# Patient Record
Sex: MALE | Race: WHITE | NOT HISPANIC OR LATINO | Employment: OTHER | URBAN - METROPOLITAN AREA
[De-identification: names, ages, dates, MRNs, and addresses within clinical notes are randomized per-mention and may not be internally consistent; named-entity substitution may affect disease eponyms.]

---

## 2019-10-18 DIAGNOSIS — M25.561 RIGHT KNEE PAIN, UNSPECIFIED CHRONICITY: Primary | ICD-10-CM

## 2019-10-21 ENCOUNTER — OFFICE VISIT (OUTPATIENT)
Dept: ORTHOPEDICS | Facility: CLINIC | Age: 59
End: 2019-10-21
Payer: COMMERCIAL

## 2019-10-21 ENCOUNTER — HOSPITAL ENCOUNTER (OUTPATIENT)
Dept: RADIOLOGY | Facility: HOSPITAL | Age: 59
Discharge: HOME | End: 2019-10-21
Attending: ORTHOPAEDIC SURGERY
Payer: COMMERCIAL

## 2019-10-21 VITALS
SYSTOLIC BLOOD PRESSURE: 137 MMHG | BODY MASS INDEX: 41.47 KG/M2 | DIASTOLIC BLOOD PRESSURE: 71 MMHG | HEIGHT: 69 IN | WEIGHT: 280 LBS | HEART RATE: 82 BPM

## 2019-10-21 DIAGNOSIS — S83.241A ACUTE MEDIAL MENISCAL TEAR, RIGHT, INITIAL ENCOUNTER: Primary | ICD-10-CM

## 2019-10-21 DIAGNOSIS — M25.561 RIGHT KNEE PAIN, UNSPECIFIED CHRONICITY: ICD-10-CM

## 2019-10-21 PROCEDURE — 73564 X-RAY EXAM KNEE 4 OR MORE: CPT | Mod: 26,RT,, | Performed by: RADIOLOGY

## 2019-10-21 PROCEDURE — 73562 X-RAY EXAM OF KNEE 3: CPT | Mod: TC,PN,59,LT

## 2019-10-21 PROCEDURE — 99203 OFFICE O/P NEW LOW 30 MIN: CPT | Mod: 25,S$GLB,, | Performed by: ORTHOPAEDIC SURGERY

## 2019-10-21 PROCEDURE — 99999 PR PBB SHADOW E&M-EST. PATIENT-LVL III: ICD-10-PCS | Mod: PBBFAC,,, | Performed by: ORTHOPAEDIC SURGERY

## 2019-10-21 PROCEDURE — 73564 XR KNEE ORTHO RIGHT WITH FLEXION: ICD-10-PCS | Mod: 26,RT,, | Performed by: RADIOLOGY

## 2019-10-21 PROCEDURE — 73562 XR KNEE ORTHO RIGHT WITH FLEXION: ICD-10-PCS | Mod: 26,59,LT, | Performed by: RADIOLOGY

## 2019-10-21 PROCEDURE — 99203 PR OFFICE/OUTPT VISIT, NEW, LEVL III, 30-44 MIN: ICD-10-PCS | Mod: 25,S$GLB,, | Performed by: ORTHOPAEDIC SURGERY

## 2019-10-21 PROCEDURE — 73562 X-RAY EXAM OF KNEE 3: CPT | Mod: 26,59,LT, | Performed by: RADIOLOGY

## 2019-10-21 PROCEDURE — 20610 LARGE JOINT ASPIRATION/INJECTION: R KNEE: ICD-10-PCS | Mod: RT,S$GLB,, | Performed by: ORTHOPAEDIC SURGERY

## 2019-10-21 PROCEDURE — 20610 DRAIN/INJ JOINT/BURSA W/O US: CPT | Mod: RT,S$GLB,, | Performed by: ORTHOPAEDIC SURGERY

## 2019-10-21 PROCEDURE — 99999 PR PBB SHADOW E&M-EST. PATIENT-LVL III: CPT | Mod: PBBFAC,,, | Performed by: ORTHOPAEDIC SURGERY

## 2019-10-21 RX ORDER — PRAVASTATIN SODIUM 20 MG/1
TABLET ORAL
Refills: 99 | COMMUNITY
Start: 2019-09-16 | End: 2021-01-26 | Stop reason: SDUPTHER

## 2019-10-21 RX ORDER — SERTRALINE HYDROCHLORIDE 100 MG/1
100 TABLET, FILM COATED ORAL DAILY
COMMUNITY
Start: 2019-08-30 | End: 2021-08-11 | Stop reason: SDUPTHER

## 2019-10-21 RX ORDER — TRIAMCINOLONE ACETONIDE 40 MG/ML
40 INJECTION, SUSPENSION INTRA-ARTICULAR; INTRAMUSCULAR
Status: DISCONTINUED | OUTPATIENT
Start: 2019-10-21 | End: 2019-10-21 | Stop reason: HOSPADM

## 2019-10-21 RX ORDER — LISINOPRIL 10 MG/1
10 TABLET ORAL DAILY
Refills: 99 | COMMUNITY
Start: 2019-09-16 | End: 2021-01-26 | Stop reason: SDUPTHER

## 2019-10-21 RX ORDER — GLIPIZIDE 5 MG/1
5 TABLET ORAL
COMMUNITY
Start: 2019-10-16 | End: 2020-09-03

## 2019-10-21 RX ADMIN — TRIAMCINOLONE ACETONIDE 40 MG: 40 INJECTION, SUSPENSION INTRA-ARTICULAR; INTRAMUSCULAR at 10:10

## 2019-10-21 NOTE — PROCEDURES
Large Joint Aspiration/Injection: R knee  Date/Time: 10/21/2019 10:30 AM  Performed by: Lucio Ivey MD  Authorized by: Lucio Ivey MD     Consent Done?:  Yes (Verbal)  Indications:  Pain  Procedure site marked: Yes    Timeout: Prior to procedure the correct patient, procedure, and site was verified    Anesthesia    Anesthetic: lidocaine 1% without epinephrine and bupivacaine 0.25% without epinephrine  Anesthetic total: 6mL    Location:  Knee  Site:  R knee  Prep: Patient was prepped and draped in usual sterile fashion    Needle size:  20 G  Approach:  Anterolateral  Medications:  40 mg triamcinolone acetonide 40 mg/mL  Patient tolerance:  Patient tolerated the procedure well with no immediate complications

## 2019-10-21 NOTE — PROGRESS NOTES
History reviewed. No pertinent past medical history.    History reviewed. No pertinent surgical history.    Current Outpatient Medications   Medication Sig    glipiZIDE (GLUCOTROL) 5 MG tablet     lisinopril 10 MG tablet Take 10 mg by mouth once daily.    ONETOUCH ULTRA BLUE TEST STRIP Strp test two times DAILY    pravastatin (PRAVACHOL) 20 MG tablet TAKE ONE TABLET BY MOUTH ONCE DAILY TO protect heart from diabetes    sertraline (ZOLOFT) 100 MG tablet      No current facility-administered medications for this visit.        Review of patient's allergies indicates:  No Known Allergies    History reviewed. No pertinent family history.    Social History     Socioeconomic History    Marital status:      Spouse name: Not on file    Number of children: Not on file    Years of education: Not on file    Highest education level: Not on file   Occupational History    Not on file   Social Needs    Financial resource strain: Not on file    Food insecurity:     Worry: Not on file     Inability: Not on file    Transportation needs:     Medical: Not on file     Non-medical: Not on file   Tobacco Use    Smoking status: Not on file   Substance and Sexual Activity    Alcohol use: Not on file    Drug use: Not on file    Sexual activity: Not on file   Lifestyle    Physical activity:     Days per week: Not on file     Minutes per session: Not on file    Stress: Not on file   Relationships    Social connections:     Talks on phone: Not on file     Gets together: Not on file     Attends Worship service: Not on file     Active member of club or organization: Not on file     Attends meetings of clubs or organizations: Not on file     Relationship status: Not on file   Other Topics Concern    Not on file   Social History Narrative    Not on file       Chief Complaint:   Chief Complaint   Patient presents with    Right Knee - Pain       History of present illness:  This is a 59-year-old male seen for right knee  pain.  Patient has had pain for few years.  Pain with prolonged walking or standing.  Pain is posteriorly and medially located.  Patient had a prior meniscal tear with arthroscopy in this knee several years ago.  Symptoms are worsening and moderate to severe.  There was no injury or fall.  Pain is a 2/10.  No recent treatment. Denies mechanical symptoms or locking or catching.        Physical Examination:    Vital Signs:    Vitals:    10/21/19 1020   BP: 137/71   Pulse: 82       Body mass index is 41.35 kg/m².    This a well-developed, well nourished patient in no acute distress.  They are alert and oriented and cooperative to examination.  Pt. walks without an antalgic gait.      Examination of the right knee shows no rashes or erythema. There are no masses ecchymosis or effusion. Patient has full range of motion from 0-130°. Patient is nontender to palpation over lateral joint line and minimally tender r to palpation over the medial joint line. Patient has a - Lachman exam, - anterior drawer exam, and - posterior drawer exam. - Rhea's exam. Knee is stable to varus and valgus stress. 5 out of 5 motor strength. Palpable distal pulses. Intact light touch sensation. Negative Patellofemoral crepitus    Examination of the left knee shows no rashes or erythema. There are no masses ecchymosis or effusion. Patient has full range of motion from 0-130°. Patient is nontender to palpation over lateral joint line and nontender to palpation over the medial joint line. Patient has a - Lachman exam, - anterior drawer exam, and - posterior drawer exam. - Rhea's exam. Knee is stable to varus and valgus stress. 5 out of 5 motor strength. Palpable distal pulses. Intact light touch sensation. Negative Patellofemoral crepitus        X-rays:  X-rays of the right knee are ordered and reviewed which show some mild arthritic changes     Assessment::  Possible medial meniscal tear of the right knee    Plan:  I reviewed the findings  with him today.  He is familiar with meniscal tears as he had a prior meniscectomy.  He is not having any mechanical symptoms though.  I recommended a cortisone injection.  Follow-up as needed.  MRI would be the next step.    This note was created using [employer] voice recognition software that occasionally misinterpreted phrases or words.    Consult note is delivered via Epic messaging service.    Answers for HPI/ROS submitted by the patient on 10/14/2019   Leg pain  unexpected weight change: No  appetite change : No  sleep disturbance: Yes  IMMUNOCOMPROMISED: No  nervous/ anxious: Yes  dysphoric mood: No  rash: No  eye redness: No  cough: No  difficulty urinating: No  hematuria: No  chest pain: No  palpitations: No  vomiting: No  diarrhea: No  constipation: No  headaches: No  seizures: No  Pain Chronicity: recurrent  History of trauma: No  Onset: more than 1 year ago  Frequency: intermittently  Progression since onset: gradually worsening  injury location: exercising  pain- numeric: 3/10  pain location: right knee  pain quality: aching, dull, sharp, numbing, shooting, throbbing  Radiating Pain: Yes  If your pain is radiating, to what part of the body?: right knee, right thigh  Aggravating factors: exercise, standing, walking, sitting  fever: No  inability to bear weight: No  itching: No  joint locking: No  limited range of motion: Yes  stiffness: No  tingling: Yes  Treatments tried: brace/corset  physical therapy: not tried  Improvement on treatment: no relief

## 2020-02-24 DIAGNOSIS — M25.562 LEFT KNEE PAIN, UNSPECIFIED CHRONICITY: Primary | ICD-10-CM

## 2020-09-03 ENCOUNTER — TELEPHONE (OUTPATIENT)
Dept: PRIMARY CARE CLINIC | Facility: CLINIC | Age: 60
End: 2020-09-03

## 2020-09-03 ENCOUNTER — OFFICE VISIT (OUTPATIENT)
Dept: PRIMARY CARE CLINIC | Facility: CLINIC | Age: 60
End: 2020-09-03
Payer: COMMERCIAL

## 2020-09-03 VITALS
TEMPERATURE: 98 F | RESPIRATION RATE: 16 BRPM | BODY MASS INDEX: 42.05 KG/M2 | HEIGHT: 69 IN | DIASTOLIC BLOOD PRESSURE: 86 MMHG | SYSTOLIC BLOOD PRESSURE: 132 MMHG | OXYGEN SATURATION: 96 % | WEIGHT: 283.94 LBS | HEART RATE: 89 BPM

## 2020-09-03 DIAGNOSIS — Z23 NEED FOR VACCINATION: ICD-10-CM

## 2020-09-03 DIAGNOSIS — E11.69 TYPE 2 DIABETES MELLITUS WITH HYPERLIPIDEMIA: Primary | ICD-10-CM

## 2020-09-03 DIAGNOSIS — E78.1 HYPERTRIGLYCERIDEMIA: ICD-10-CM

## 2020-09-03 DIAGNOSIS — I10 ESSENTIAL HYPERTENSION, BENIGN: ICD-10-CM

## 2020-09-03 DIAGNOSIS — E78.5 TYPE 2 DIABETES MELLITUS WITH HYPERLIPIDEMIA: Primary | ICD-10-CM

## 2020-09-03 DIAGNOSIS — Z11.59 NEED FOR HEPATITIS C SCREENING TEST: ICD-10-CM

## 2020-09-03 DIAGNOSIS — E66.01 MORBID OBESITY WITH BMI OF 40.0-44.9, ADULT: ICD-10-CM

## 2020-09-03 DIAGNOSIS — Z12.5 PROSTATE CANCER SCREENING: ICD-10-CM

## 2020-09-03 DIAGNOSIS — F41.1 GAD (GENERALIZED ANXIETY DISORDER): ICD-10-CM

## 2020-09-03 DIAGNOSIS — Z11.4 SCREENING FOR HIV (HUMAN IMMUNODEFICIENCY VIRUS): ICD-10-CM

## 2020-09-03 PROCEDURE — 90471 IMMUNIZATION ADMIN: CPT | Mod: S$GLB,,, | Performed by: FAMILY MEDICINE

## 2020-09-03 PROCEDURE — 90714 TD VACC NO PRESV 7 YRS+ IM: CPT | Mod: S$GLB,,, | Performed by: FAMILY MEDICINE

## 2020-09-03 PROCEDURE — 90732 PPSV23 VACC 2 YRS+ SUBQ/IM: CPT | Mod: S$GLB,,, | Performed by: FAMILY MEDICINE

## 2020-09-03 PROCEDURE — 90471 PNEUMOCOCCAL POLYSACCHARIDE VACCINE 23-VALENT =>2YO SQ IM: ICD-10-PCS | Mod: S$GLB,,, | Performed by: FAMILY MEDICINE

## 2020-09-03 PROCEDURE — 99204 OFFICE O/P NEW MOD 45 MIN: CPT | Mod: 25,S$GLB,, | Performed by: FAMILY MEDICINE

## 2020-09-03 PROCEDURE — 90732 PNEUMOCOCCAL POLYSACCHARIDE VACCINE 23-VALENT =>2YO SQ IM: ICD-10-PCS | Mod: S$GLB,,, | Performed by: FAMILY MEDICINE

## 2020-09-03 PROCEDURE — 99204 PR OFFICE/OUTPT VISIT, NEW, LEVL IV, 45-59 MIN: ICD-10-PCS | Mod: 25,S$GLB,, | Performed by: FAMILY MEDICINE

## 2020-09-03 PROCEDURE — 99999 PR PBB SHADOW E&M-EST. PATIENT-LVL IV: CPT | Mod: PBBFAC,,, | Performed by: FAMILY MEDICINE

## 2020-09-03 PROCEDURE — 90714 TD VACCINE GREATER THAN OR EQUAL TO 7YO PRESERVATIVE FREE IM: ICD-10-PCS | Mod: S$GLB,,, | Performed by: FAMILY MEDICINE

## 2020-09-03 PROCEDURE — 90472 TD VACCINE GREATER THAN OR EQUAL TO 7YO PRESERVATIVE FREE IM: ICD-10-PCS | Mod: S$GLB,,, | Performed by: FAMILY MEDICINE

## 2020-09-03 PROCEDURE — 90472 IMMUNIZATION ADMIN EACH ADD: CPT | Mod: S$GLB,,, | Performed by: FAMILY MEDICINE

## 2020-09-03 PROCEDURE — 99999 PR PBB SHADOW E&M-EST. PATIENT-LVL IV: ICD-10-PCS | Mod: PBBFAC,,, | Performed by: FAMILY MEDICINE

## 2020-09-03 RX ORDER — DIAZEPAM 10 MG/1
10 TABLET ORAL 2 TIMES DAILY PRN
COMMUNITY

## 2020-09-03 RX ORDER — GLIPIZIDE 5 MG/1
5 TABLET, FILM COATED, EXTENDED RELEASE ORAL
Qty: 30 TABLET | Refills: 0 | Status: SHIPPED | OUTPATIENT
Start: 2020-09-03 | End: 2020-10-08 | Stop reason: ALTCHOICE

## 2020-09-03 RX ORDER — ZOSTER VACCINE RECOMBINANT, ADJUVANTED 50 MCG/0.5
0.5 KIT INTRAMUSCULAR ONCE
Qty: 1 EACH | Refills: 0 | Status: SHIPPED | OUTPATIENT
Start: 2020-09-03 | End: 2020-09-03

## 2020-09-03 RX ORDER — METOPROLOL SUCCINATE 25 MG/1
25 TABLET, EXTENDED RELEASE ORAL DAILY
COMMUNITY
Start: 2020-08-13 | End: 2021-12-20

## 2020-09-03 NOTE — PROGRESS NOTES
Patient identified by name and date of birth, allergies reviewed, immunizations administered as ordered by aseptic technique tolerated well by pt.

## 2020-09-03 NOTE — TELEPHONE ENCOUNTER
----- Message from Sami Hernadez MD sent at 9/3/2020  9:07 AM CDT -----  Please get most recent colonoscopy report from Dr. Robles

## 2020-09-03 NOTE — PROGRESS NOTES
"Subjective:       Patient ID: Yosef Giordano is a 60 y.o. male.    Chief Complaint: [facility] Care    Had been seeing Dr. Bonilla for ~10 years, diagnosed with diabetes mellitus last year. Originally started on metformin, but caused bad GI upset, so switched to glipizide, though out for 1-2 weeks. Fasting -150, postprandial often >200. Seeing Dr. Mancia, scheduled for stress test. Has had several colonoscopies by Dr. Robles, most recent 2-3 years ago    Review of Systems   Constitutional: Negative for activity change and unexpected weight change.   HENT: Negative for hearing loss, rhinorrhea and trouble swallowing.    Eyes: Negative for discharge and visual disturbance.   Respiratory: Negative for chest tightness and wheezing.    Cardiovascular: Negative for chest pain and palpitations.   Gastrointestinal: Negative for blood in stool, constipation, diarrhea and vomiting.   Endocrine: Negative for polydipsia and polyuria.   Genitourinary: Negative for difficulty urinating, hematuria and urgency.   Musculoskeletal: Negative for arthralgias, joint swelling and neck pain.   Allergic/Immunologic: Negative for immunocompromised state.   Neurological: Negative for weakness and headaches.   Psychiatric/Behavioral: Negative for confusion and dysphoric mood.       Objective:      Vitals:    09/03/20 0847   BP: 132/86   BP Location: Left arm   Patient Position: Sitting   BP Method: Large (Manual)   Pulse: 89   Resp: 16   Temp: 98 °F (36.7 °C)   TempSrc: Oral   SpO2: 96%   Weight: 128.8 kg (283 lb 15.2 oz)   Height: 5' 9" (1.753 m)     Physical Exam  Vitals signs and nursing note reviewed.   Constitutional:       Appearance: He is well-developed.   HENT:      Head: Normocephalic and atraumatic.   Eyes:      Pupils: Pupils are equal, round, and reactive to light.   Neck:      Musculoskeletal: Neck supple.      Vascular: No carotid bruit or JVD.   Cardiovascular:      Rate and Rhythm: Normal rate and regular rhythm. "      Pulses:           Radial pulses are 2+ on the right side and 2+ on the left side.        Dorsalis pedis pulses are 2+ on the right side and 2+ on the left side.      Heart sounds: Normal heart sounds.   Pulmonary:      Effort: Pulmonary effort is normal.      Breath sounds: Normal breath sounds.   Abdominal:      General: Bowel sounds are normal.      Palpations: Abdomen is soft.      Tenderness: There is no abdominal tenderness.   Feet:      Right foot:      Protective Sensation: 10 sites tested. 10 sites sensed.      Skin integrity: Skin integrity normal.      Left foot:      Protective Sensation: 10 sites tested. 10 sites sensed.      Skin integrity: Skin integrity normal.   Skin:     General: Skin is warm and dry.   Neurological:      Mental Status: He is alert and oriented to person, place, and time.   Psychiatric:         Behavior: Behavior normal.         Lab Results   Component Value Date    CHOL 172 01/02/2019    TRIG 299 (H) 01/02/2019    HDL 46 01/02/2019    ALT 22 01/02/2019    AST 24 01/02/2019     (L) 01/02/2019    K 3.8 01/02/2019    CL 98 (L) 01/02/2019    CREATININE 0.9 01/02/2019    BUN 16 01/02/2019    CO2 24 01/02/2019    HGBA1C 8.3 (H) 01/17/2019      Assessment:       1. Type 2 diabetes mellitus with hyperlipidemia    2. Essential hypertension, benign    3. Hypertriglyceridemia    4. Morbid obesity with BMI of 40.0-44.9, adult    5. JOVITA (generalized anxiety disorder)    6. Need for vaccination    7. Prostate cancer screening    8. Need for hepatitis C screening test    9. Screening for HIV (human immunodeficiency virus)        Plan:       Type 2 diabetes mellitus with hyperlipidemia  -     Comprehensive metabolic panel; Future; Expected date: 09/03/2020  -     Hemoglobin A1C; Future; Expected date: 09/03/2020  -     Microalbumin/creatinine urine ratio; Future; Expected date: 09/03/2020  -     TSH; Future; Expected date: 09/03/2020  -      DIABETES FOOT EXAM  -     glipiZIDE  (GLUCOTROL) 5 MG TR24; Take 1 tablet (5 mg total) by mouth daily with breakfast.  Dispense: 30 tablet; Refill: 0  Switch to XR, adjust meds further if necessary  Essential hypertension, benign  -     CBC auto differential; Future; Expected date: 09/03/2020  -     Comprehensive metabolic panel; Future; Expected date: 09/03/2020  -     TSH; Future; Expected date: 09/03/2020  BP well controlled  Hypertriglyceridemia  -     Comprehensive metabolic panel; Future; Expected date: 09/03/2020  -     Lipid Panel; Future; Expected date: 09/03/2020  -     TSH; Future; Expected date: 09/03/2020  Fasting labs today  Morbid obesity with BMI of 40.0-44.9, adult  Low carb diet, exercise  JOVITA (generalized anxiety disorder)  Stable on current regimen  Need for vaccination  -     Pneumococcal Polysaccharide Vaccine (23 Valent) (SQ/IM)  -     Td Vaccine (Adult) - Preservative Free  -     varicella-zoster gE-AS01B, PF, (SHINGRIX, PF,) 50 mcg/0.5 mL injection; Inject 0.5 mLs into the muscle once. for 1 dose  Dispense: 1 each; Refill: 0    Prostate cancer screening  -     PSA, Screening; Future; Expected date: 09/03/2020    Need for hepatitis C screening test  -     Hepatitis C Antibody; Future; Expected date: 09/03/2020    Screening for HIV (human immunodeficiency virus)  -     HIV 1/2 Ag/Ab (4th Gen); Future; Expected date: 09/03/2020      Medication List with Changes/Refills   New Medications    GLIPIZIDE (GLUCOTROL) 5 MG TR24    Take 1 tablet (5 mg total) by mouth daily with breakfast.    VARICELLA-ZOSTER GE-AS01B, PF, (SHINGRIX, PF,) 50 MCG/0.5 ML INJECTION    Inject 0.5 mLs into the muscle once. for 1 dose   Current Medications    DIAZEPAM (VALIUM) 10 MG TAB    Take 10 mg by mouth 2 (two) times daily as needed.    LISINOPRIL 10 MG TABLET    Take 10 mg by mouth once daily.    METOPROLOL SUCCINATE (TOPROL-XL) 25 MG 24 HR TABLET    Take 25 mg by mouth once daily.    ONETOUCH ULTRA BLUE TEST STRIP STRP    test two times DAILY     PRAVASTATIN (PRAVACHOL) 20 MG TABLET    TAKE ONE TABLET BY MOUTH ONCE DAILY TO protect heart from diabetes    SERTRALINE (ZOLOFT) 100 MG TABLET    Take 100 mg by mouth once daily.    Discontinued Medications    GLIPIZIDE (GLUCOTROL) 5 MG TABLET    Take 5 mg by mouth daily with breakfast.

## 2020-09-25 ENCOUNTER — PATIENT MESSAGE (OUTPATIENT)
Dept: OTHER | Facility: OTHER | Age: 60
End: 2020-09-25

## 2020-10-03 DIAGNOSIS — E78.5 TYPE 2 DIABETES MELLITUS WITH HYPERLIPIDEMIA: Primary | ICD-10-CM

## 2020-10-03 DIAGNOSIS — E11.69 TYPE 2 DIABETES MELLITUS WITH HYPERLIPIDEMIA: Primary | ICD-10-CM

## 2020-10-08 ENCOUNTER — OFFICE VISIT (OUTPATIENT)
Dept: DIABETES | Facility: CLINIC | Age: 60
End: 2020-10-08
Payer: COMMERCIAL

## 2020-10-08 VITALS
OXYGEN SATURATION: 98 % | SYSTOLIC BLOOD PRESSURE: 133 MMHG | WEIGHT: 282.69 LBS | BODY MASS INDEX: 41.87 KG/M2 | HEART RATE: 75 BPM | DIASTOLIC BLOOD PRESSURE: 65 MMHG | HEIGHT: 69 IN

## 2020-10-08 DIAGNOSIS — E78.1 HYPERTRIGLYCERIDEMIA: ICD-10-CM

## 2020-10-08 DIAGNOSIS — E11.65 TYPE 2 DIABETES MELLITUS WITH HYPERGLYCEMIA, WITHOUT LONG-TERM CURRENT USE OF INSULIN: Primary | ICD-10-CM

## 2020-10-08 DIAGNOSIS — E78.5 TYPE 2 DIABETES MELLITUS WITH HYPERLIPIDEMIA: ICD-10-CM

## 2020-10-08 DIAGNOSIS — E11.29 TYPE 2 DIABETES MELLITUS WITH MICROALBUMINURIA, WITHOUT LONG-TERM CURRENT USE OF INSULIN: ICD-10-CM

## 2020-10-08 DIAGNOSIS — E11.42 TYPE 2 DIABETES MELLITUS WITH DIABETIC POLYNEUROPATHY, WITHOUT LONG-TERM CURRENT USE OF INSULIN: ICD-10-CM

## 2020-10-08 DIAGNOSIS — R80.9 TYPE 2 DIABETES MELLITUS WITH MICROALBUMINURIA, WITHOUT LONG-TERM CURRENT USE OF INSULIN: ICD-10-CM

## 2020-10-08 DIAGNOSIS — E11.69 TYPE 2 DIABETES MELLITUS WITH HYPERLIPIDEMIA: ICD-10-CM

## 2020-10-08 DIAGNOSIS — Z71.9 HEALTH EDUCATION/COUNSELING: ICD-10-CM

## 2020-10-08 DIAGNOSIS — E66.01 MORBID OBESITY WITH BMI OF 40.0-44.9, ADULT: ICD-10-CM

## 2020-10-08 LAB — GLUCOSE SERPL-MCNC: 133 MG/DL (ref 70–110)

## 2020-10-08 PROCEDURE — 99204 PR OFFICE/OUTPT VISIT, NEW, LEVL IV, 45-59 MIN: ICD-10-PCS | Mod: S$GLB,,, | Performed by: NURSE PRACTITIONER

## 2020-10-08 PROCEDURE — 82962 GLUCOSE BLOOD TEST: CPT | Mod: S$GLB,,, | Performed by: NURSE PRACTITIONER

## 2020-10-08 PROCEDURE — 82962 POCT GLUCOSE, HAND-HELD DEVICE: ICD-10-PCS | Mod: S$GLB,,, | Performed by: NURSE PRACTITIONER

## 2020-10-08 PROCEDURE — 99999 PR PBB SHADOW E&M-EST. PATIENT-LVL V: CPT | Mod: PBBFAC,,, | Performed by: NURSE PRACTITIONER

## 2020-10-08 PROCEDURE — 99204 OFFICE O/P NEW MOD 45 MIN: CPT | Mod: S$GLB,,, | Performed by: NURSE PRACTITIONER

## 2020-10-08 PROCEDURE — 99999 PR PBB SHADOW E&M-EST. PATIENT-LVL V: ICD-10-PCS | Mod: PBBFAC,,, | Performed by: NURSE PRACTITIONER

## 2020-10-08 RX ORDER — FLASH GLUCOSE SENSOR
1 KIT MISCELLANEOUS
Qty: 2 KIT | Refills: 11 | Status: SHIPPED | OUTPATIENT
Start: 2020-10-08 | End: 2021-01-08

## 2020-10-08 RX ORDER — DULAGLUTIDE 0.75 MG/.5ML
0.75 INJECTION, SOLUTION SUBCUTANEOUS WEEKLY
Qty: 4 PEN | Refills: 0 | Status: SHIPPED | OUTPATIENT
Start: 2020-10-08 | End: 2020-12-08

## 2020-10-08 NOTE — ASSESSMENT & PLAN NOTE
Uncontrolled.   He has been working on diet and cutting back on CHO intake   Has an upcoming appointment with diabetes education.   Placed a personal CGM ( Freestyle selam on patient today) given a sample        -- Medication Changes: Discontinue Glipizide   Start Trulicity 0.75 mg weekly for 4 weeks and then increase to 1.5 mg weekly indefinitely. Discussed MOA & SE.           -- Reviewed goals of therapy are to get the best control we can without hypoglycemia.    -- Advised frequent self blood glucose monitoring.  Patient encouraged to document glucose results and bring them to every clinic visit. - at least 2 times per day if doing finger sticks. Will try to get freestyle selam sensor. RX sent to pharmacy. Sample placed on patient today in clinic   -- Hypoglycemia precautions discussed. Instructed on precautions before driving.    -- Call for Bg repeatedly < 90 or > 180.   -- Close adherence to lifestyle changes recommended.   -- Periodic follow ups for eye evaluations, foot care and dental care suggested.  -- Refer to diabetes education- diet, comprehensive review, freestyle selam       -- external referral for eye exam- he plans to see Dr. Melendrez

## 2020-10-08 NOTE — ASSESSMENT & PLAN NOTE
Optimize BG readings.   See above.   Referral to podiatry for nail care     Educated patient to check feet daily for any foreign objects and/or wounds. Discussed with patient the importance of wearing appropriate footwear at all times, not to walk barefoot ever, and to check shoes before putting them on feet. Instructed patient to keep feet dry by regularly changing shoes and socks and drying feet after baths and exercises. Also, instructed patient to report any new lesions, discolorations, or swelling to a healthcare professional.

## 2020-10-08 NOTE — ASSESSMENT & PLAN NOTE
Body mass index is 41.75 kg/m².  Increases insulin resistance.   Discussed DM diet and exercise.   Hopeful for weight loss with GLP1

## 2020-10-08 NOTE — LETTER
October 8, 2020      Sami Hernadez MD  [address]  [address]  [place] [address]           [facility]  [address], [facility] [address]  [place] [address]  Phone: [phone]  Fax: [phone]          Patient: Yosef Giordano   MR Number: [id number]   YOB: 1960   Date of Visit: 10/8/2020       Dear Dr. Sami Hernadez:    Thank you for referring Yosef Giordano to me for evaluation. Attached you will find relevant portions of my assessment and plan of care.    If you have questions, please do not hesitate to call me. I look forward to following Yosef Giordano along with you.    Sincerely,    Martine Doty, CRISTINA    Enclosure  CC:  No Recipients    If you would like to receive this communication electronically, please contact [email or web address] or [phone] to request more information on [facility] access.    For providers and/or their staff who would like to refer a patient to [facility], please contact us through our one-stop-shop provider referral line, [facility], at [phone].    If you feel you have received this communication in error or would no longer like to receive these types of communications, please e-mail [email or web address]

## 2020-10-08 NOTE — PATIENT INSTRUCTIONS
Stop Glipizide     Start Trulicity 0.75 mg weekly for 4 weeks and then increase to 1.5 mg weekly indefinitely. Discussed MOA & SE.   Please notify me for any abdominal pain, nausea, vomiting, diarrhea.   [phone]    Add in over the counter fish oil   1 gram (1000 mg) 2 times per day

## 2020-10-08 NOTE — PROGRESS NOTES
CC:   Chief Complaint   Patient presents with    Diabetes Mellitus     Tested pts  in office       HPI: Yosef Giordano is a 60 y.o. male presents for an initial visit today for the management of T2DM.     He was diagnosed with Type 2 diabetes in 6084-1643 on routine lab work and initally started on metformin. He reports severe GI upset with Metformin use. Never on insulin therapy.     Family hx of diabetes: sister- prediabetes   Hospitalized for diabetes: denies     No personal or FH of thyroid cancer or personal of pancreatic cancer or pancreatitis.     DIABETES COMPLICATIONS: nephropathy and peripheral neuropathy      Diabetes Management Status    ASA:  No    Statin: Taking  ACE/ARB: Taking    Screening or Prevention Patient's value Goal Complete/Controlled?   HgA1C Testing and Control   Lab Results   Component Value Date    HGBA1C 8.5 (H) 09/08/2020      Annually/Less than 8% No   Lipid profile : 09/08/2020 Annually Yes   LDL control Lab Results   Component Value Date    LDLCALC 93 09/08/2020    Annually/Less than 100 mg/dl  Yes   Nephropathy screening Lab Results   Component Value Date    LABMICR 99.0 09/08/2020     Lab Results   Component Value Date    PROTEINUA 1+ (A) 01/02/2019    Annually Yes   Blood pressure BP Readings from Last 1 Encounters:   10/08/20 133/65    Less than 140/90 Yes   Dilated retinal exam Most Recent Eye Exam Date: Not Found due for eye exam  Annually No   Foot exam   : 09/03/2020 Annually Yes       CURRENT A1C:    Hemoglobin A1C   Date Value Ref Range Status   09/08/2020 8.5 (H) 4.0 - 5.6 % Final     Comment:     ADA Screening Guidelines:  5.7-6.4%  Consistent with prediabetes  >or=6.5%  Consistent with diabetes  High levels of fetal hemoglobin interfere with the HbA1C  assay. Heterozygous hemoglobin variants (HbS, HgC, etc)do  not significantly interfere with this assay.   However, presence of multiple variants may affect accuracy.     01/17/2019 8.3 (H) 4.0 - 5.6 % Final      Comment:     ADA Screening Guidelines:  5.7-6.4%  Consistent with prediabetes  >or=6.5%  Consistent with diabetes  High levels of fetal hemoglobin interfere with the HbA1C  assay. Heterozygous hemoglobin variants (HbS, HgC, etc)do  not significantly interfere with this assay.   However, presence of multiple variants may affect accuracy.         GOAL A1C: 6.5% without hypoglycemia       DM MEDICATIONS USED IN THE PAST: Metformin- severe GI upset   Glipizide       CURRENT DIABETES MEDICATIONS: Glipizide 5 mg daily   Insulin: N/A   Missed doses: rarely       BLOOD GLUCOSE MONITORING:  He checks 2 times per day   No logs or meter to clinic today for review.   Per oral recall   160-180  Low 200  Today in office 133- which is the best he has seen       Results for orders placed or performed in visit on 10/08/20   POCT Glucose, Hand-Held Device   Result Value Ref Range    POC Glucose 133 (A) 70 - 110 MG/DL         HYPOGLYCEMIA:  No      MEALS: eating 2-3 meals per day   Cut back on CHO intake   BF: turkey sausage and eggs   Lunch: skips or has a light snack- cheese   Dinner: baked chicken - with brown rice , cauliflower rice or salad- ranch   Snack: cheese   Drinks: water   Coffee in the AM- splenda - SF creamer   No juices or sodas        CURRENT EXERCISE:  No    Review of Systems  Review of Systems   Constitutional: Negative for appetite change, fatigue and unexpected weight change.   HENT: Negative for trouble swallowing.    Eyes: Negative for visual disturbance.   Respiratory: Negative for shortness of breath.    Cardiovascular: Negative for chest pain.   Gastrointestinal: Negative for nausea.   Endocrine: Negative for polydipsia, polyphagia and polyuria.   Genitourinary:        + Nocturia - at least 1 x per night    Skin: Negative for wound.   Neurological: Positive for numbness (feet feel cold ).       Physical Exam   Physical Exam  Vitals signs and nursing note reviewed.   Constitutional:       Appearance: He is  well-developed. He is obese.   HENT:      Head: Normocephalic and atraumatic.      Right Ear: External ear normal.      Left Ear: External ear normal.      Nose: Nose normal.   Neck:      Musculoskeletal: Normal range of motion and neck supple.      Thyroid: No thyromegaly.      Trachea: No tracheal deviation.   Cardiovascular:      Rate and Rhythm: Normal rate and regular rhythm.      Heart sounds: No murmur.   Pulmonary:      Effort: Pulmonary effort is normal. No respiratory distress.      Breath sounds: Normal breath sounds.   Abdominal:      Palpations: Abdomen is soft.      Tenderness: There is no abdominal tenderness.      Hernia: No hernia is present.   Skin:     General: Skin is warm and dry.      Capillary Refill: Capillary refill takes less than 2 seconds.      Findings: No rash.   Neurological:      Mental Status: He is alert and oriented to person, place, and time.      Cranial Nerves: No cranial nerve deficit.   Psychiatric:         Behavior: Behavior normal.         Judgment: Judgment normal.         FOOT EXAMINATION: Appropriate footwear     Protective Sensation (w/ 10 gram monofilament):  Right: Intact  Left: Intact    Visual Inspection:  Normal -  Bilateral, Nails Intact - without Evidence of Foot Deformity- Bilateral, Callus -  Bilateral and Dry Skin -  Bilateral    Pedal Pulses:   Right: Present  Left: Present    Posterior tibialis:   Right:Present  Left: Present        Lab Results   Component Value Date    TSH 2.96 09/08/2020           Type 2 diabetes mellitus with hyperglycemia, without long-term current use of insulin  Uncontrolled.   He has been working on diet and cutting back on CHO intake   Has an upcoming appointment with diabetes education.   Placed a personal CGM ( Freestyle selam on patient today) given a sample        -- Medication Changes: Discontinue Glipizide   Start Trulicity 0.75 mg weekly for 4 weeks and then increase to 1.5 mg weekly indefinitely. Discussed MOA & SE.            -- Reviewed goals of therapy are to get the best control we can without hypoglycemia.    -- Advised frequent self blood glucose monitoring.  Patient encouraged to document glucose results and bring them to every clinic visit. - at least 2 times per day if doing finger sticks. Will try to get freestyle selam sensor. RX sent to pharmacy. Sample placed on patient today in clinic   -- Hypoglycemia precautions discussed. Instructed on precautions before driving.    -- Call for Bg repeatedly < 90 or > 180.   -- Close adherence to lifestyle changes recommended.   -- Periodic follow ups for eye evaluations, foot care and dental care suggested.  -- Refer to diabetes education- diet, comprehensive review, freestyle selam       -- external referral for eye exam- he plans to see Dr. Melendrez     Type 2 diabetes mellitus with microalbuminuria, without long-term current use of insulin  Optimize BG readings.   See above.   On ACE-inhibitor    Type 2 diabetes mellitus with diabetic polyneuropathy, without long-term current use of insulin  Optimize BG readings.   See above.   Referral to podiatry for nail care     Educated patient to check feet daily for any foreign objects and/or wounds. Discussed with patient the importance of wearing appropriate footwear at all times, not to walk barefoot ever, and to check shoes before putting them on feet. Instructed patient to keep feet dry by regularly changing shoes and socks and drying feet after baths and exercises. Also, instructed patient to report any new lesions, discolorations, or swelling to a healthcare professional.        Morbid obesity with BMI of 40.0-44.9, adult  Body mass index is 41.75 kg/m².  Increases insulin resistance.   Discussed DM diet and exercise.   Hopeful for weight loss with GLP1    Hypertriglyceridemia  Optimize BG readings.  On Statin    See above.   Recommended OTC fish oil - 1 gram BID        Spent 60 minutes with patient with >50% time spent in  counseling on medication options, diet, personal CGM, set him up on the personal CGM in clinic today.       Follow up in about 3 months (around 1/8/2021).      Orders Placed This Encounter   Procedures    Hemoglobin A1C     Standing Status:   Future     Standing Expiration Date:   4/8/2022    Basic Metabolic Panel     Standing Status:   Future     Standing Expiration Date:   4/8/2022    Lipid Panel     Standing Status:   Future     Standing Expiration Date:   4/8/2022    Ambulatory referral/consult to Optometry     Standing Status:   Future     Standing Expiration Date:   11/8/2021     Referral Priority:   Routine     Referral Type:   Vision (Optometry)     Referral Reason:   Specialty Services Required     Requested Specialty:   Optometry     Number of Visits Requested:   1    Ambulatory referral/consult to Diabetes Education     Standing Status:   Future     Standing Expiration Date:   10/8/2021     Referral Priority:   Routine     Referral Type:   Consultation     Referral Reason:   Specialty Services Required     Referred to Provider:   Linnea Sifuentes RD, CDE     Requested Specialty:   Diabetes     Number of Visits Requested:   1    Ambulatory referral/consult to Podiatry     Standing Status:   Future     Standing Expiration Date:   11/8/2021     Referral Priority:   Routine     Referral Type:   Consultation     Referral Reason:   Specialty Services Required     Referred to Provider:   Felipa John DPM     Requested Specialty:   Podiatry     Number of Visits Requested:   1    POCT Glucose, Hand-Held Device       Recommendations were discussed with the patient in detail  The patient verbalized understanding and agrees with the plan outlined as above.     This note was partly generated with [employer] voice recognition software. I apologize for any possible typographical errors.

## 2020-10-12 ENCOUNTER — PATIENT MESSAGE (OUTPATIENT)
Dept: DIABETES | Facility: CLINIC | Age: 60
End: 2020-10-12

## 2020-10-12 NOTE — TELEPHONE ENCOUNTER
Spoke to pharmacy and they informed pt needs a PA for the Trulicity. [facility] Key: [id number] PA case ID: [id number]

## 2020-11-24 ENCOUNTER — CLINICAL SUPPORT (OUTPATIENT)
Dept: DIABETES | Facility: CLINIC | Age: 60
End: 2020-11-24
Payer: COMMERCIAL

## 2020-11-24 VITALS — HEIGHT: 69 IN | BODY MASS INDEX: 41.86 KG/M2 | WEIGHT: 282.63 LBS

## 2020-11-24 DIAGNOSIS — E11.69 TYPE 2 DIABETES MELLITUS WITH HYPERLIPIDEMIA: ICD-10-CM

## 2020-11-24 DIAGNOSIS — E78.5 TYPE 2 DIABETES MELLITUS WITH HYPERLIPIDEMIA: ICD-10-CM

## 2020-11-24 PROCEDURE — 99999 PR PBB SHADOW E&M-EST. PATIENT-LVL III: CPT | Mod: PBBFAC,,, | Performed by: DIETITIAN, REGISTERED

## 2020-11-24 PROCEDURE — 99999 PR PBB SHADOW E&M-EST. PATIENT-LVL III: ICD-10-PCS | Mod: PBBFAC,,, | Performed by: DIETITIAN, REGISTERED

## 2020-11-24 PROCEDURE — G0108 PR DIAB MANAGE TRN  PER INDIV: ICD-10-PCS | Mod: S$GLB,,, | Performed by: DIETITIAN, REGISTERED

## 2020-11-24 PROCEDURE — G0108 DIAB MANAGE TRN  PER INDIV: HCPCS | Mod: S$GLB,,, | Performed by: DIETITIAN, REGISTERED

## 2020-11-24 NOTE — PROGRESS NOTES
Diabetes Education  Author: Linnea Sifuentes RD, CDE  Date: 11/24/2020    Diabetes Care Management Summary  Diabetes Education Record Assessment/Progress: Initial  Current Diabetes Risk Level: Moderate     Last A1c:   Lab Results   Component Value Date    HGBA1C 8.5 (H) 09/08/2020     Last visit with Diabetes Educator: Last Education Visit: Not Found      Diabetes Type  Diabetes Type : Type II    Diabetes History  Diabetes Diagnosis: 1-3 years(Dx in Jan 2019)  Current Treatment: Injectable, Diet(trulicity)  Reviewed Problem List with Patient: Yes    Health Maintenance was reviewed today with patient. Discussed with patient importance of routine eye exams, foot exams/foot care, blood work (i.e.: A1c, microalbumin, and lipid), dental visits, yearly flu vaccine, and pneumonia vaccine as indicated by PCP. Patient verbalized understanding.     Health Maintenance Topics with due status: Not Due       Topic Last Completion Date    Colorectal Cancer Screening 08/03/2017    TETANUS VACCINE 09/03/2020    PROSTATE-SPECIFIC ANTIGEN 09/08/2020    Lipid Panel 09/08/2020    Hemoglobin A1c 09/08/2020    Low Dose Statin 10/08/2020    Foot Exam 10/08/2020     Health Maintenance Due   Topic Date Due    Eye Exam  02/14/1970    Shingles Vaccine (2 of 2) 10/29/2020       Nutrition  Meal Planning: skipping meals, artificial sweeteners, snacks between meal, water, eats out seldom(eating out a couple times a week)  What type of sweetener do you use?: Stevia/Truvia  What type of beverages do you drink?: water, other (see comments), regular soda/tea(coffee, every once and a while sweet tea)  Meal Plan 24 Hour Recall - Breakfast: 10AM Turkey breakfast sausage and eggs, coffee with SF creamer and truvia  Meal Plan 24 Hour Recall - Lunch: skipped  Meal Plan 24 Hour Recall - Dinner: Red beans with cauliflower rice  Meal Plan 24 Hour Recall - Snack: chips (not lately), cheese-its(when bored)    Monitoring   Monitoring: One Touch Verio IQ  Self  Monitoring : testing every couple days  Blood Glucose Logs: No  Do you use a personal continuous glucose monitor?: No  In the last month, how often have you had a low blood sugar reaction?: never  What are your symptoms of low blood sugar?: weak and shaky  How do you treat low blood sugar?: juice  Can you tell when your blood sugar is too high?: no  How do you treat high blood sugar?: medication    Exercise   Exercise Type: none(working around the house a lot lately)  Frequency: Never    Current Diabetes Treatment   Current Treatment: Injectable, Diet(trulicity)    Social History  Preferred Learning Method: Face to Face, Hands On  Primary Support: Self, Spouse  Educational Level: Some College  Occupation: retired  Smoking Status: Never a Smoker  Alcohol Use: Rarely            DDS-2 Score  ( > 3 = SIGNIFICANT DISTRESS): 2.5                   Barriers to Change  Barriers to Change: None  Learning Challenges : None    Readiness to Learn   Readiness to Learn : Acceptance    Cultural Influences  Cultural Influences: No    Diabetes Education Assessment/Progress  Diabetes Disease Process (diabetes disease process and treatment options): Individual Session, Written Materials Provided, Instructed, Discussion, Comprehends Key Points  Nutrition (Incorporating nutritional management into one's lifestyle): Individual Session, Written Materials Provided, Instructed, Demonstration, Discussion, Comprehends Key Points  Physical Activity (incorporating physical activity into one's lifestyle): Individual Session, Written Materials Provided, Instructed, Discussion, Comprehends Key Points  Medications (states correct name, dose, onset, peak, duration, side effects & timing of meds): Individual Session, Written Materials Provided, Instructed, Discussion, Comprehends Key Points  Monitoring (monitoring blood glucose/other parameters & using results): Individual Session, Instructed, Discussion, Comprehends Key Points, Written Materials  Provided  Acute Complications (preventing, detecting, and treating acute complications): Individual Session, Written Materials Provided, Instructed, Discussion, Comprehends Key Points  Chronic Complications (preventing, detecting, and treating chronic complications): Individual Session, Written Materials Provided, Instructed, Discussion, Comprehends Key Points  Clinical (diabetes, other pertinent medical history, and relevant comorbidities reviewed during visit): Individual Session, Written Materials Provided, Instructed, Discussion, Comprehends Key Points  Cognitive (knowledge of self-management skills, functional health literacy): Individual Session, Written Materials Provided, Instructed, Discussion, Comprehends Key Points  Psychosocial (emotional response to diabetes): Individual Session, Written Materials Provided, Instructed, Discussion, Comprehends Key Points  Diabetes Distress and Support Systems: Individual Session, Written Materials Provided, Instructed, Discussion, Comprehends Key Points  Behavioral (readiness for change, lifestyle practices, self-care behaviors): Individual Session, Written Materials Provided, Instructed, Discussion, Comprehends Key Points  Patient educated on what is DM, T1DM, T2DM, risk factors, managing DM, DM diet, carbohydrate counting, meal planning, reading a food label, healthy snack options, benefits of physical activity, diabetes care schedule, foot care guidelines, diabetes and retinopathy screening, s/s hypo and hyperglycemia, long/short term complication of uncontrolled DM, importance of compliance with treatment plan, how to use a glucometer, reviewed understanding diabetes distress, medications for treating DM, their mechanism of action and possible side effects, reviewed current level and goal level for HgbA1c, blood glucose, microalbumin, and lipids. Patient provided with written literature, diabetes management resources and support, DM Management program contact  information.    Goals  Patient has selected/evaluated goals during today's session: Yes, selected  Healthy Eating: Set  Start Date: 11/24/20  Target Date: 12/24/20         Diabetes Care Plan/Intervention  Education Plan/Intervention: Individual Follow-Up DSMT    Diabetes Meal Plan  Restrictions: Low Fat, Low Sodium, Restricted Carbohydrate  Calories: 1800  Carbohydrate Per Meal: 30-45g  Carbohydrate Per Snack : 7-15g  Fat: 50  Protein: 135    Today's Self-Management Care Plan was developed with the patient's input and is based on barriers identified during today's assessment.    The long and short-term goals in the care plan were written with the patient/caregiver's input. The patient has agreed to work toward these goals to improve his overall diabetes control.      The patient received a copy of today's self-management plan and verbalized understanding of the care plan, goals, and all of today's instructions.      The patient was encouraged to communicate with his physician and care team regarding his condition(s) and treatment.  I provided the patient with my contact information today and encouraged him to contact me via phone or patient portal as needed.     Education Units of Time   Time Spent: 60 min

## 2020-12-08 DIAGNOSIS — E11.65 TYPE 2 DIABETES MELLITUS WITH HYPERGLYCEMIA, WITHOUT LONG-TERM CURRENT USE OF INSULIN: ICD-10-CM

## 2020-12-08 RX ORDER — DULAGLUTIDE 0.75 MG/.5ML
INJECTION, SOLUTION SUBCUTANEOUS
Qty: 4 ML | Refills: 0 | OUTPATIENT
Start: 2020-12-08

## 2020-12-08 RX ORDER — DULAGLUTIDE 1.5 MG/.5ML
1.5 INJECTION, SOLUTION SUBCUTANEOUS
Qty: 4 PEN | Refills: 3 | Status: SHIPPED | OUTPATIENT
Start: 2020-12-08 | End: 2021-01-08

## 2020-12-08 NOTE — TELEPHONE ENCOUNTER
Spoke with patient regarding his Trulicity refill.  He denies any GI upset with Trulicity use  Will increase to the 1.5 mg weekly dose  He verbalized understanding.  All questions answered.

## 2021-01-08 ENCOUNTER — NUTRITION (OUTPATIENT)
Dept: DIABETES | Facility: CLINIC | Age: 61
End: 2021-01-08
Payer: COMMERCIAL

## 2021-01-08 ENCOUNTER — OFFICE VISIT (OUTPATIENT)
Dept: DIABETES | Facility: CLINIC | Age: 61
End: 2021-01-08
Payer: COMMERCIAL

## 2021-01-08 VITALS
SYSTOLIC BLOOD PRESSURE: 136 MMHG | BODY MASS INDEX: 40.53 KG/M2 | HEIGHT: 69 IN | HEART RATE: 83 BPM | WEIGHT: 273.63 LBS | DIASTOLIC BLOOD PRESSURE: 74 MMHG | OXYGEN SATURATION: 98 %

## 2021-01-08 DIAGNOSIS — E78.5 TYPE 2 DIABETES MELLITUS WITH HYPERLIPIDEMIA: ICD-10-CM

## 2021-01-08 DIAGNOSIS — R80.9 TYPE 2 DIABETES MELLITUS WITH MICROALBUMINURIA, WITHOUT LONG-TERM CURRENT USE OF INSULIN: ICD-10-CM

## 2021-01-08 DIAGNOSIS — E11.65 TYPE 2 DIABETES MELLITUS WITH HYPERGLYCEMIA, WITHOUT LONG-TERM CURRENT USE OF INSULIN: Primary | ICD-10-CM

## 2021-01-08 DIAGNOSIS — E11.42 TYPE 2 DIABETES MELLITUS WITH DIABETIC POLYNEUROPATHY, WITHOUT LONG-TERM CURRENT USE OF INSULIN: ICD-10-CM

## 2021-01-08 DIAGNOSIS — E66.01 MORBID OBESITY WITH BMI OF 40.0-44.9, ADULT: ICD-10-CM

## 2021-01-08 DIAGNOSIS — E11.69 TYPE 2 DIABETES MELLITUS WITH HYPERLIPIDEMIA: ICD-10-CM

## 2021-01-08 DIAGNOSIS — E11.29 TYPE 2 DIABETES MELLITUS WITH MICROALBUMINURIA, WITHOUT LONG-TERM CURRENT USE OF INSULIN: ICD-10-CM

## 2021-01-08 DIAGNOSIS — E78.1 HYPERTRIGLYCERIDEMIA: ICD-10-CM

## 2021-01-08 PROCEDURE — 99999 PR PBB SHADOW E&M-EST. PATIENT-LVL IV: ICD-10-PCS | Mod: PBBFAC,,, | Performed by: NURSE PRACTITIONER

## 2021-01-08 PROCEDURE — 99999 PR PBB SHADOW E&M-EST. PATIENT-LVL IV: CPT | Mod: PBBFAC,,, | Performed by: NURSE PRACTITIONER

## 2021-01-08 PROCEDURE — G0108 PR DIAB MANAGE TRN  PER INDIV: ICD-10-PCS | Mod: S$GLB,,, | Performed by: DIETITIAN, REGISTERED

## 2021-01-08 PROCEDURE — 99214 OFFICE O/P EST MOD 30 MIN: CPT | Mod: S$GLB,,, | Performed by: NURSE PRACTITIONER

## 2021-01-08 PROCEDURE — 99999 PR PBB SHADOW E&M-EST. PATIENT-LVL I: CPT | Mod: PBBFAC,,, | Performed by: DIETITIAN, REGISTERED

## 2021-01-08 PROCEDURE — G0108 DIAB MANAGE TRN  PER INDIV: HCPCS | Mod: S$GLB,,, | Performed by: DIETITIAN, REGISTERED

## 2021-01-08 PROCEDURE — 99999 PR PBB SHADOW E&M-EST. PATIENT-LVL I: ICD-10-PCS | Mod: PBBFAC,,, | Performed by: DIETITIAN, REGISTERED

## 2021-01-08 PROCEDURE — 99214 PR OFFICE/OUTPT VISIT, EST, LEVL IV, 30-39 MIN: ICD-10-PCS | Mod: S$GLB,,, | Performed by: NURSE PRACTITIONER

## 2021-01-08 RX ORDER — NAPROXEN SODIUM 220 MG/1
81 TABLET, FILM COATED ORAL DAILY
Refills: 0
Start: 2021-01-08 | End: 2022-11-11

## 2021-01-08 RX ORDER — DULAGLUTIDE 4.5 MG/.5ML
4.5 INJECTION, SOLUTION SUBCUTANEOUS
Qty: 4 PEN | Refills: 6 | Status: SHIPPED | OUTPATIENT
Start: 2021-02-08 | End: 2021-06-22 | Stop reason: SDUPTHER

## 2021-01-08 RX ORDER — DULAGLUTIDE 3 MG/.5ML
3 INJECTION, SOLUTION SUBCUTANEOUS
Qty: 4 PEN | Refills: 0 | Status: SHIPPED | OUTPATIENT
Start: 2021-01-08 | End: 2021-06-22 | Stop reason: SDUPTHER

## 2021-01-08 RX ORDER — GLUCOSAM/CHONDRO/HERB 149/HYAL 750-100 MG
1 TABLET ORAL 2 TIMES DAILY
COMMUNITY
Start: 2021-01-08 | End: 2023-06-27 | Stop reason: SDUPTHER

## 2021-01-26 ENCOUNTER — PATIENT MESSAGE (OUTPATIENT)
Dept: DIABETES | Facility: CLINIC | Age: 61
End: 2021-01-26

## 2021-01-26 RX ORDER — PRAVASTATIN SODIUM 20 MG/1
TABLET ORAL
Qty: 30 TABLET | Refills: 6 | Status: SHIPPED | OUTPATIENT
Start: 2021-01-26 | End: 2021-08-11 | Stop reason: SDUPTHER

## 2021-01-26 RX ORDER — LISINOPRIL 10 MG/1
10 TABLET ORAL DAILY
Qty: 30 TABLET | Refills: 6 | Status: SHIPPED | OUTPATIENT
Start: 2021-01-26 | End: 2021-10-11 | Stop reason: SDUPTHER

## 2021-01-27 ENCOUNTER — TELEPHONE (OUTPATIENT)
Dept: PODIATRY | Facility: CLINIC | Age: 61
End: 2021-01-27

## 2021-02-24 ENCOUNTER — TELEPHONE (OUTPATIENT)
Dept: HEMATOLOGY/ONCOLOGY | Facility: CLINIC | Age: 61
End: 2021-02-24

## 2021-03-19 LAB
LEFT EYE DM RETINOPATHY: NEGATIVE
RIGHT EYE DM RETINOPATHY: NEGATIVE

## 2021-06-22 ENCOUNTER — OFFICE VISIT (OUTPATIENT)
Dept: DIABETES | Facility: CLINIC | Age: 61
End: 2021-06-22
Payer: COMMERCIAL

## 2021-06-22 VITALS
TEMPERATURE: 99 F | BODY MASS INDEX: 39.5 KG/M2 | DIASTOLIC BLOOD PRESSURE: 70 MMHG | OXYGEN SATURATION: 98 % | SYSTOLIC BLOOD PRESSURE: 122 MMHG | HEART RATE: 91 BPM | WEIGHT: 266.69 LBS | HEIGHT: 69 IN

## 2021-06-22 DIAGNOSIS — R80.9 TYPE 2 DIABETES MELLITUS WITH MICROALBUMINURIA, WITHOUT LONG-TERM CURRENT USE OF INSULIN: ICD-10-CM

## 2021-06-22 DIAGNOSIS — E66.01 CLASS 2 SEVERE OBESITY DUE TO EXCESS CALORIES WITH SERIOUS COMORBIDITY AND BODY MASS INDEX (BMI) OF 39.0 TO 39.9 IN ADULT: ICD-10-CM

## 2021-06-22 DIAGNOSIS — E11.65 TYPE 2 DIABETES MELLITUS WITH HYPERGLYCEMIA, WITHOUT LONG-TERM CURRENT USE OF INSULIN: Primary | ICD-10-CM

## 2021-06-22 DIAGNOSIS — Z71.9 HEALTH EDUCATION/COUNSELING: ICD-10-CM

## 2021-06-22 DIAGNOSIS — E78.1 HYPERTRIGLYCERIDEMIA: ICD-10-CM

## 2021-06-22 DIAGNOSIS — E11.29 TYPE 2 DIABETES MELLITUS WITH MICROALBUMINURIA, WITHOUT LONG-TERM CURRENT USE OF INSULIN: ICD-10-CM

## 2021-06-22 DIAGNOSIS — Z12.5 PROSTATE CANCER SCREENING: ICD-10-CM

## 2021-06-22 DIAGNOSIS — E11.42 TYPE 2 DIABETES MELLITUS WITH DIABETIC POLYNEUROPATHY, WITHOUT LONG-TERM CURRENT USE OF INSULIN: ICD-10-CM

## 2021-06-22 PROBLEM — E66.812 CLASS 2 SEVERE OBESITY DUE TO EXCESS CALORIES WITH SERIOUS COMORBIDITY AND BODY MASS INDEX (BMI) OF 39.0 TO 39.9 IN ADULT: Status: ACTIVE | Noted: 2020-09-03

## 2021-06-22 PROCEDURE — 99213 PR OFFICE/OUTPT VISIT, EST, LEVL III, 20-29 MIN: ICD-10-PCS | Mod: S$GLB,,, | Performed by: NURSE PRACTITIONER

## 2021-06-22 PROCEDURE — 99213 OFFICE O/P EST LOW 20 MIN: CPT | Mod: S$GLB,,, | Performed by: NURSE PRACTITIONER

## 2021-06-22 PROCEDURE — 99999 PR PBB SHADOW E&M-EST. PATIENT-LVL IV: CPT | Mod: PBBFAC,,, | Performed by: NURSE PRACTITIONER

## 2021-06-22 PROCEDURE — 99999 PR PBB SHADOW E&M-EST. PATIENT-LVL IV: ICD-10-PCS | Mod: PBBFAC,,, | Performed by: NURSE PRACTITIONER

## 2021-06-22 RX ORDER — DULAGLUTIDE 4.5 MG/.5ML
4.5 INJECTION, SOLUTION SUBCUTANEOUS
Qty: 4 PEN | Refills: 6 | Status: SHIPPED | OUTPATIENT
Start: 2021-06-22 | End: 2021-12-22 | Stop reason: SDUPTHER

## 2021-06-23 ENCOUNTER — PATIENT OUTREACH (OUTPATIENT)
Dept: ADMINISTRATIVE | Facility: HOSPITAL | Age: 61
End: 2021-06-23

## 2021-07-06 ENCOUNTER — PATIENT MESSAGE (OUTPATIENT)
Dept: ADMINISTRATIVE | Facility: HOSPITAL | Age: 61
End: 2021-07-06

## 2021-07-06 ENCOUNTER — PATIENT OUTREACH (OUTPATIENT)
Dept: ADMINISTRATIVE | Facility: HOSPITAL | Age: 61
End: 2021-07-06

## 2021-08-11 ENCOUNTER — PATIENT MESSAGE (OUTPATIENT)
Dept: DIABETES | Facility: CLINIC | Age: 61
End: 2021-08-11

## 2021-08-11 RX ORDER — PRAVASTATIN SODIUM 20 MG/1
TABLET ORAL
Qty: 30 TABLET | Refills: 6 | Status: SHIPPED | OUTPATIENT
Start: 2021-08-11 | End: 2021-12-22

## 2021-08-11 RX ORDER — SERTRALINE HYDROCHLORIDE 100 MG/1
200 TABLET, FILM COATED ORAL DAILY
Qty: 60 TABLET | Refills: 6 | Status: SHIPPED | OUTPATIENT
Start: 2021-08-11 | End: 2022-03-16

## 2021-10-05 ENCOUNTER — PATIENT MESSAGE (OUTPATIENT)
Dept: ADMINISTRATIVE | Facility: HOSPITAL | Age: 61
End: 2021-10-05

## 2021-10-11 RX ORDER — LISINOPRIL 10 MG/1
10 TABLET ORAL DAILY
Qty: 30 TABLET | Refills: 1 | Status: SHIPPED | OUTPATIENT
Start: 2021-10-11 | End: 2021-12-14 | Stop reason: SDUPTHER

## 2021-10-13 ENCOUNTER — TELEPHONE (OUTPATIENT)
Dept: DIABETES | Facility: CLINIC | Age: 61
End: 2021-10-13

## 2021-12-14 RX ORDER — LISINOPRIL 10 MG/1
10 TABLET ORAL DAILY
Qty: 30 TABLET | Refills: 0 | Status: SHIPPED | OUTPATIENT
Start: 2021-12-14 | End: 2022-01-31

## 2021-12-15 ENCOUNTER — TELEPHONE (OUTPATIENT)
Dept: DIABETES | Facility: CLINIC | Age: 61
End: 2021-12-15
Payer: COMMERCIAL

## 2021-12-16 ENCOUNTER — PATIENT MESSAGE (OUTPATIENT)
Dept: PRIMARY CARE CLINIC | Facility: CLINIC | Age: 61
End: 2021-12-16
Payer: COMMERCIAL

## 2021-12-16 RX ORDER — LISINOPRIL 10 MG/1
10 TABLET ORAL DAILY
Qty: 30 TABLET | Refills: 0 | OUTPATIENT
Start: 2021-12-16

## 2021-12-20 ENCOUNTER — OFFICE VISIT (OUTPATIENT)
Dept: UROLOGY | Facility: CLINIC | Age: 61
End: 2021-12-20
Payer: COMMERCIAL

## 2021-12-20 VITALS
DIASTOLIC BLOOD PRESSURE: 86 MMHG | HEIGHT: 69 IN | BODY MASS INDEX: 39.69 KG/M2 | HEART RATE: 92 BPM | SYSTOLIC BLOOD PRESSURE: 143 MMHG | WEIGHT: 268 LBS

## 2021-12-20 DIAGNOSIS — R97.20 ELEVATED PSA: Primary | ICD-10-CM

## 2021-12-20 LAB
BILIRUB SERPL-MCNC: ABNORMAL MG/DL
BLOOD URINE, POC: ABNORMAL
CLARITY, POC UA: CLEAR
COLOR, POC UA: YELLOW
GLUCOSE UR QL STRIP: NORMAL
KETONES UR QL STRIP: ABNORMAL
LEUKOCYTE ESTERASE URINE, POC: ABNORMAL
NITRITE, POC UA: ABNORMAL
PH, POC UA: 5
PROTEIN, POC: ABNORMAL
SPECIFIC GRAVITY, POC UA: 1.02
UROBILINOGEN, POC UA: ABNORMAL

## 2021-12-20 PROCEDURE — 87086 URINE CULTURE/COLONY COUNT: CPT | Performed by: NURSE PRACTITIONER

## 2021-12-20 PROCEDURE — 81002 URINALYSIS NONAUTO W/O SCOPE: CPT | Mod: S$GLB,,, | Performed by: NURSE PRACTITIONER

## 2021-12-20 PROCEDURE — 99999 PR PBB SHADOW E&M-EST. PATIENT-LVL IV: ICD-10-PCS | Mod: PBBFAC,,, | Performed by: NURSE PRACTITIONER

## 2021-12-20 PROCEDURE — 99999 PR PBB SHADOW E&M-EST. PATIENT-LVL IV: CPT | Mod: PBBFAC,,, | Performed by: NURSE PRACTITIONER

## 2021-12-20 PROCEDURE — 81002 POCT URINE DIPSTICK WITHOUT MICROSCOPE: ICD-10-PCS | Mod: S$GLB,,, | Performed by: NURSE PRACTITIONER

## 2021-12-20 PROCEDURE — 99203 OFFICE O/P NEW LOW 30 MIN: CPT | Mod: 25,S$GLB,, | Performed by: NURSE PRACTITIONER

## 2021-12-20 PROCEDURE — 99203 PR OFFICE/OUTPT VISIT, NEW, LEVL III, 30-44 MIN: ICD-10-PCS | Mod: 25,S$GLB,, | Performed by: NURSE PRACTITIONER

## 2021-12-21 LAB — BACTERIA UR CULT: NO GROWTH

## 2021-12-22 ENCOUNTER — OFFICE VISIT (OUTPATIENT)
Dept: DIABETES | Facility: CLINIC | Age: 61
End: 2021-12-22
Payer: COMMERCIAL

## 2021-12-22 VITALS
WEIGHT: 264 LBS | HEART RATE: 82 BPM | OXYGEN SATURATION: 97 % | SYSTOLIC BLOOD PRESSURE: 116 MMHG | DIASTOLIC BLOOD PRESSURE: 72 MMHG | HEIGHT: 69 IN | BODY MASS INDEX: 39.1 KG/M2

## 2021-12-22 DIAGNOSIS — E66.01 CLASS 2 SEVERE OBESITY DUE TO EXCESS CALORIES WITH SERIOUS COMORBIDITY AND BODY MASS INDEX (BMI) OF 38.0 TO 38.9 IN ADULT: ICD-10-CM

## 2021-12-22 DIAGNOSIS — E11.65 TYPE 2 DIABETES MELLITUS WITH HYPERGLYCEMIA, WITHOUT LONG-TERM CURRENT USE OF INSULIN: ICD-10-CM

## 2021-12-22 DIAGNOSIS — E78.5 DYSLIPIDEMIA, GOAL LDL BELOW 100: ICD-10-CM

## 2021-12-22 DIAGNOSIS — E11.42 TYPE 2 DIABETES MELLITUS WITH DIABETIC POLYNEUROPATHY, WITHOUT LONG-TERM CURRENT USE OF INSULIN: ICD-10-CM

## 2021-12-22 DIAGNOSIS — I10 ESSENTIAL HYPERTENSION, BENIGN: ICD-10-CM

## 2021-12-22 DIAGNOSIS — E11.29 TYPE 2 DIABETES MELLITUS WITH MICROALBUMINURIA, WITHOUT LONG-TERM CURRENT USE OF INSULIN: Primary | ICD-10-CM

## 2021-12-22 DIAGNOSIS — E78.1 HYPERTRIGLYCERIDEMIA: ICD-10-CM

## 2021-12-22 DIAGNOSIS — R80.9 TYPE 2 DIABETES MELLITUS WITH MICROALBUMINURIA, WITHOUT LONG-TERM CURRENT USE OF INSULIN: Primary | ICD-10-CM

## 2021-12-22 PROCEDURE — 99999 PR PBB SHADOW E&M-EST. PATIENT-LVL IV: ICD-10-PCS | Mod: PBBFAC,,, | Performed by: NURSE PRACTITIONER

## 2021-12-22 PROCEDURE — 99999 PR PBB SHADOW E&M-EST. PATIENT-LVL IV: CPT | Mod: PBBFAC,,, | Performed by: NURSE PRACTITIONER

## 2021-12-22 PROCEDURE — 99214 OFFICE O/P EST MOD 30 MIN: CPT | Mod: S$GLB,,, | Performed by: NURSE PRACTITIONER

## 2021-12-22 PROCEDURE — 99214 PR OFFICE/OUTPT VISIT, EST, LEVL IV, 30-39 MIN: ICD-10-PCS | Mod: S$GLB,,, | Performed by: NURSE PRACTITIONER

## 2021-12-22 RX ORDER — PRAVASTATIN SODIUM 40 MG/1
40 TABLET ORAL NIGHTLY
Qty: 90 TABLET | Refills: 2 | Status: SHIPPED | OUTPATIENT
Start: 2021-12-22 | End: 2022-03-24 | Stop reason: SDUPTHER

## 2021-12-22 RX ORDER — DULAGLUTIDE 4.5 MG/.5ML
4.5 INJECTION, SOLUTION SUBCUTANEOUS
Qty: 4 PEN | Refills: 6 | Status: SHIPPED | OUTPATIENT
Start: 2021-12-22 | End: 2021-12-23

## 2021-12-23 ENCOUNTER — TELEPHONE (OUTPATIENT)
Dept: DIABETES | Facility: CLINIC | Age: 61
End: 2021-12-23
Payer: COMMERCIAL

## 2021-12-23 DIAGNOSIS — R80.9 TYPE 2 DIABETES MELLITUS WITH MICROALBUMINURIA, WITHOUT LONG-TERM CURRENT USE OF INSULIN: Primary | ICD-10-CM

## 2021-12-23 DIAGNOSIS — Z87.19 HISTORY OF PANCREATITIS: ICD-10-CM

## 2021-12-23 DIAGNOSIS — E11.29 TYPE 2 DIABETES MELLITUS WITH MICROALBUMINURIA, WITHOUT LONG-TERM CURRENT USE OF INSULIN: Primary | ICD-10-CM

## 2021-12-23 RX ORDER — DAPAGLIFLOZIN 5 MG/1
5 TABLET, FILM COATED ORAL DAILY
Qty: 30 TABLET | Refills: 0 | Status: SHIPPED | OUTPATIENT
Start: 2021-12-23 | End: 2022-02-10

## 2021-12-29 ENCOUNTER — TELEPHONE (OUTPATIENT)
Dept: DIABETES | Facility: CLINIC | Age: 61
End: 2021-12-29
Payer: COMMERCIAL

## 2022-01-03 ENCOUNTER — PATIENT MESSAGE (OUTPATIENT)
Dept: DIABETES | Facility: CLINIC | Age: 62
End: 2022-01-03
Payer: COMMERCIAL

## 2022-01-06 ENCOUNTER — PATIENT MESSAGE (OUTPATIENT)
Dept: DIABETES | Facility: CLINIC | Age: 62
End: 2022-01-06
Payer: COMMERCIAL

## 2022-01-21 ENCOUNTER — PATIENT MESSAGE (OUTPATIENT)
Dept: ADMINISTRATIVE | Facility: HOSPITAL | Age: 62
End: 2022-01-21
Payer: COMMERCIAL

## 2022-01-27 ENCOUNTER — OFFICE VISIT (OUTPATIENT)
Dept: UROLOGY | Facility: CLINIC | Age: 62
End: 2022-01-27
Payer: COMMERCIAL

## 2022-01-27 VITALS
WEIGHT: 259.06 LBS | HEIGHT: 69 IN | BODY MASS INDEX: 38.37 KG/M2 | DIASTOLIC BLOOD PRESSURE: 79 MMHG | SYSTOLIC BLOOD PRESSURE: 141 MMHG | HEART RATE: 103 BPM

## 2022-01-27 DIAGNOSIS — R97.20 ELEVATED PSA: Primary | ICD-10-CM

## 2022-01-27 DIAGNOSIS — N40.0 ENLARGED PROSTATE ON RECTAL EXAMINATION: ICD-10-CM

## 2022-01-27 DIAGNOSIS — Z80.42 FAMILY HISTORY OF PROSTATE CANCER: ICD-10-CM

## 2022-01-27 PROCEDURE — 99213 OFFICE O/P EST LOW 20 MIN: CPT | Mod: S$GLB,,, | Performed by: NURSE PRACTITIONER

## 2022-01-27 PROCEDURE — 99213 PR OFFICE/OUTPT VISIT, EST, LEVL III, 20-29 MIN: ICD-10-PCS | Mod: S$GLB,,, | Performed by: NURSE PRACTITIONER

## 2022-01-27 PROCEDURE — 99999 PR PBB SHADOW E&M-EST. PATIENT-LVL IV: ICD-10-PCS | Mod: PBBFAC,,, | Performed by: NURSE PRACTITIONER

## 2022-01-27 PROCEDURE — 99999 PR PBB SHADOW E&M-EST. PATIENT-LVL IV: CPT | Mod: PBBFAC,,, | Performed by: NURSE PRACTITIONER

## 2022-01-27 NOTE — PATIENT INSTRUCTIONS
"Patient Education       Prostate Cancer Screening (PSA Tests)   The Basics   Written by the doctors and editors at [facility]   What is prostate cancer screening? -- Prostate cancer screening is a way in which doctors check the prostate gland for signs of cancer. The prostate gland is part of the male anatomy. It sits below the bladder and in front of the rectum. It forms a ring around the urethra, the tube that carries urine out of the body (figure 1).  The main test used to screen for prostate cancer is a blood test called a "PSA test." Some people also have an exam called a rectal exam (figure 2).   Who should be screened for prostate cancer? -- Prostate cancer screening is done in people who have no symptoms of the disease. It is not clear whether getting screened for prostate cancer can extend life or prevent symptoms or problems. For this reason, doctors do not know who - if anyone - should be screened for prostate cancer.  Most experts recommend working with your doctor to decide whether screening is right for you. In most cases, this discussion should start around the age of 50. If you have any risk factors for prostate cancer, you might want to begin screening at age 40 to 45. Your risk is higher if you are black, have a father or brother with prostate cancer, or have certain genetic mutations such as "BRCA1" or "BRCA2."  Most doctors recommend against screening if you are age 70 or older, or have serious health problems.  Why do doctors offer screening? -- Doctors offer screening in the hopes of catching prostate cancer early - before it has a chance to grow, spread, or cause symptoms. With many cancers, catching the disease early is an important part of effective treatment. But prostate cancer is not like many other cancers. It usually grows slowly and does not usually lead to death. The problem is that a small number of prostate cancers are serious and can lead to death. Doctors do not have an ideal way to " "tell which prostate cancers are deadly and which ones would never cause any problems.  Certain tests can suggest which prostate cancers might be more likely to cause problems. But the tests are far from perfect. Also, different studies have come to different conclusions about the benefits of screening and whether or not it lowers the risk of dying from prostate cancer.  What are the drawbacks to getting screened? -- PSA tests have 2 main drawbacks:  · PSA tests sometimes give "false positives." This means they suggest cancer when there is actually no cancer. This can lead to unneeded worry and to further tests. One of these tests, a biopsy, can be briefly painful.  · When PSA tests lead to the discovery of cancer, there is very often no way to tell whether the cancer is one that could do harm. That means you could get treated for cancer that would never have done you any harm. That's a problem because treatment for prostate cancer has risks and often causes problems of its own. For instance, prostate cancer treatment can cause you to leak urine and to have problems with sex.  How do I decide if I should be screened? -- Work with your doctor or nurse to decide if screening is right for you. This will involve thinking about how likely it is that you will get prostate cancer. If you have a high risk of prostate cancer, screening might be a good idea.  You will also need to think about how you feel about the possible benefits and harms of being screened. Ask yourself:  · Do I want to know if I have prostate cancer, even if the cancer might never do me any harm?  · Would I be treated if I learned that I had prostate cancer?  · How do I feel about the risks of being treated for prostate cancer?  · How do I feel about the risks of getting a deadly or aggressive form of prostate cancer?  · Would I be willing to accept a high risk of side effects from treatment in return for a small chance of living longer?  What is a PSA " "test? -- PSA stands for "prostate-specific antigen." PSA is a protein made by the prostate. Levels of this protein usually go up when a person has prostate cancer. The protein also goes up for reasons that do not involve cancer. For example, PSA levels rise when a man:  · Has a condition called benign prostatic hyperplasia (BPH), sometimes called an enlarged prostate  · Has a prostate infection, also called prostatitis  · Hurts his prostate, for example while riding a bike  · Ejaculates (has an orgasm)  What if my PSA level is too high? -- If your PSA level is high, try not to panic. It's possible your PSA is high for reasons unrelated to cancer. If your PSA level is only somewhat high, often the next step is to have the PSA test again. For 2 days before the second test, avoid ejaculating and bike-riding. If your doctor thinks you have a prostate infection, you might also need to take antibiotics for a while before you repeat the test.  If your PSA is still high on the second test, or if it was very high the first time, you will probably need more testing. This might include a biopsy or other test. A biopsy means that a doctor will insert a needle into your prostate to take tiny samples of tissue. Those samples will then go to the lab to be checked for cancer.  If it turns out you do have cancer, remember that prostate cancer is not usually deadly. It usually grows slowly, so you probably have time to decide what to do. There are treatments that can sometimes cure prostate cancer. You might also choose to hold off on treatment and wait to see if your cancer shows signs of progressing.  How often should I be screened for prostate cancer? -- If you do decide to be screened, experts recommend repeating screening every 2 to 4 years.  Doctors recommend stopping screening when you turn 70 or if you develop serious health problems. In these cases, the benefits of screening are not worth the possible harms.  All topics are " updated as new evidence becomes available and our peer review process is complete.  This topic retrieved from [employer] on: Sep 21, 2021.  Topic 95787 Version 13.0  Release: 29.4.2 - C29.263  © 2021 UpToDate, Inc. and/or its affiliates. All rights reserved.  figure 1: Prostate gland     This drawing shows male internal organs and a close-up of the prostate gland.  Graphic 43877 Version 5.0    figure 2: Rectal exam     During a rectal exam, the doctor or nurse puts a finger inside your rectum and feels your prostate gland. That way he or she can see how big it is and whether it has bumps or dents or anything unusual.  Graphic 79343 Version 5.0    Consumer Information Use and Disclaimer   This information is not specific medical advice and does not replace information you receive from your health care provider. This is only a brief summary of general information. It does NOT include all information about conditions, illnesses, injuries, tests, procedures, treatments, therapies, discharge instructions or life-style choices that may apply to you. You must talk with your health care provider for complete information about your health and treatment options. This information should not be used to decide whether or not to accept your health care provider's advice, instructions or recommendations. Only your health care provider has the knowledge and training to provide advice that is right for you. The use of this information is governed by the [employer] End User License Agreement, available at https://www.[employer]/en/solutions/[employer]/about/tanya.The use of [employer] content is governed by the [employer] Terms of Use. ©2021 UpToDate, Inc. All rights reserved.  Copyright   © 2021 UpToDate, Inc. and/or its affiliates. All rights reserved.

## 2022-01-27 NOTE — PROGRESS NOTES
"Subjective:      Yosef Giordano is a 61 y.o. male who returns today regarding his elevated PSA.    Elevated PSA  Patient is here with an elevated PSA. He has no personal history and a family history of prostate cancer (maternal great grandfather). His AUA Symptom Score is 15/1, manifested as irritative symptoms including frequency, urgency and obstructive symptoms including intermittency. He has no prior genitourinary history of hematuria, hematospermia, prostatitis, UTI, erectile dysfunction, urolithiasis, epididymal orchitis, previous  surgery.  Previous PSA values are :  Lab Results   Component Value Date    PSA 4.4 (H) 12/15/2021    PSA 2.9 09/08/2020     The following portions of the patient's history were reviewed and updated as appropriate: allergies, current medications, past family history, past medical history, past social history, past surgical history and problem list.    Review of Systems  A comprehensive multipoint review of systems was negative except as otherwise stated in the HPI.     Objective:   Vitals: BP (!) 141/79 (BP Location: Left arm, Patient Position: Sitting, BP Method: Large (Automatic))   Pulse 103   Ht 5' 9" (1.753 m)   Wt 117.5 kg (259 lb 0.7 oz)   BMI 38.25 kg/m²     Physical Exam   General: alert and oriented, no acute distress  Respiratory: Symmetric expansion, non-labored breathing  Cardiovascular: regular rate and rhythm, no peripheral edema  Abdomen: soft, non distended  Genitourinary:   Prostate: enlarged, unable to reach base, no palpable nodules; seminal vesicles not palpated  Rectum: normal rectal tone, no rectal mass, normal perineum  Skin: normal coloration and turgor, no rashes, no suspicious skin lesions noted  Neuro: no gross deficits  Psych: normal judgment and insight, normal mood/affect and non-anxious    Lab Review   Urinalysis demonstrates no specimen   Lab Results   Component Value Date    WBC 11.66 12/23/2021    HGB 15.9 12/23/2021    HCT 46.4 12/23/2021 "    MCV 84 12/23/2021     12/23/2021     Lab Results   Component Value Date    CREATININE 1.2 12/23/2021    BUN 21 12/23/2021     Lab Results   Component Value Date    PSA 4.4 (H) 12/15/2021     Lab Results   Component Value Date    PSA 4.4 (H) 12/15/2021    PSA 2.9 09/08/2020    PSATOTAL 6.3 (H) 01/24/2022    PSAFREE 0.84 01/24/2022    PSAFREEPCT 13.33 01/24/2022         Assessment and Plan:   1. Elevated PSA  2. Enlarged prostate on rectal examination3. Family history of prostate cancer  --Repeat PSA 6.3 with 13.33% free  --UC negative  --Unable to reach base; no palpable nodules     I discussed with the patient the finding of an abnormal PSA test.  The patient is aware that PSA is a protein made by the prostate in both benign and malignant conditions.  We discussed the most common differential diagnosis of an elevated PSA including BPH, prostatitis (chronic and acute), PIN (a pre-cancerous condition of the prostate) and prostate cancer.  The issues of sensitivity,   specificity and the predictive values of PSA were discussed in detail.  The patient is aware that the relevant issues regarding his risk of having prostate cancer are his PSA and any abnormalities of a LYNSEY. It was explained to the patient in detail that not all men with an elevated PSA have prostate cancer nor do all men without prostate cancer have a normal PSA.     We had a long discussion about the benefits and limitations of PSA testing/screening. Although imperfect, PSA testing does have some utility, and I explained what I thought were the benefits of PSA screening. Importantly, it can be helpful in identifying at risk patients for a significant prostate cancerWe discussed this significance of his elevated PSA, various benign etiologies including BPH and infection, and the associated risk of prostate cancer.  Per the PCPT calculator, a prostate biopsy has a 36% chance of demonstrating high grade cancer, 22% chance of demonstrating low  grade cancer, and 42% chance of being negative.      Based on this, the recommendation was made that he undergo prostate biopsy.  Other options were discussed as well, including observation, prostate MRI and finasteride challenge. The risks and benefits of prostate biopsy were discussed, including bleeding, infection, and false negative results.    After this discussion, he has elected to proceed with MRI prostate      --FU after MRI     This note is dictated on M*Modal word recognition program.  There are word recognition mistakes that are occasionally missed on review.

## 2022-01-31 RX ORDER — LISINOPRIL 10 MG/1
TABLET ORAL
Qty: 30 TABLET | Refills: 0 | Status: SHIPPED | OUTPATIENT
Start: 2022-01-31 | End: 2022-02-14 | Stop reason: SDUPTHER

## 2022-01-31 NOTE — TELEPHONE ENCOUNTER
No new care gaps identified.  Powered by [employer]. Reference number: [id number].   1/31/2022 2:17:01 PM CST

## 2022-02-03 ENCOUNTER — PATIENT MESSAGE (OUTPATIENT)
Dept: PRIMARY CARE CLINIC | Facility: CLINIC | Age: 62
End: 2022-02-03
Payer: COMMERCIAL

## 2022-02-09 ENCOUNTER — PATIENT MESSAGE (OUTPATIENT)
Dept: DIABETES | Facility: CLINIC | Age: 62
End: 2022-02-09
Payer: COMMERCIAL

## 2022-02-09 DIAGNOSIS — E11.29 TYPE 2 DIABETES MELLITUS WITH MICROALBUMINURIA, WITHOUT LONG-TERM CURRENT USE OF INSULIN: ICD-10-CM

## 2022-02-09 DIAGNOSIS — R80.9 TYPE 2 DIABETES MELLITUS WITH MICROALBUMINURIA, WITHOUT LONG-TERM CURRENT USE OF INSULIN: ICD-10-CM

## 2022-02-09 RX ORDER — DAPAGLIFLOZIN 5 MG/1
5 TABLET, FILM COATED ORAL DAILY
Qty: 30 TABLET | Refills: 0 | OUTPATIENT
Start: 2022-02-09

## 2022-02-14 ENCOUNTER — OFFICE VISIT (OUTPATIENT)
Dept: PRIMARY CARE CLINIC | Facility: CLINIC | Age: 62
End: 2022-02-14
Payer: COMMERCIAL

## 2022-02-14 VITALS
OXYGEN SATURATION: 98 % | HEIGHT: 69 IN | SYSTOLIC BLOOD PRESSURE: 122 MMHG | WEIGHT: 256.5 LBS | HEART RATE: 92 BPM | DIASTOLIC BLOOD PRESSURE: 78 MMHG | BODY MASS INDEX: 37.99 KG/M2 | RESPIRATION RATE: 18 BRPM

## 2022-02-14 DIAGNOSIS — E78.5 TYPE 2 DIABETES MELLITUS WITH HYPERLIPIDEMIA: Primary | ICD-10-CM

## 2022-02-14 DIAGNOSIS — E11.69 TYPE 2 DIABETES MELLITUS WITH HYPERLIPIDEMIA: Primary | ICD-10-CM

## 2022-02-14 DIAGNOSIS — I10 ESSENTIAL HYPERTENSION, BENIGN: ICD-10-CM

## 2022-02-14 DIAGNOSIS — Z87.19 HISTORY OF PANCREATITIS: ICD-10-CM

## 2022-02-14 DIAGNOSIS — E78.5 DYSLIPIDEMIA, GOAL LDL BELOW 100: ICD-10-CM

## 2022-02-14 DIAGNOSIS — E66.01 SEVERE OBESITY (BMI 35.0-39.9) WITH COMORBIDITY: ICD-10-CM

## 2022-02-14 PROCEDURE — 99999 PR PBB SHADOW E&M-EST. PATIENT-LVL IV: ICD-10-PCS | Mod: PBBFAC,,, | Performed by: FAMILY MEDICINE

## 2022-02-14 PROCEDURE — 99999 PR PBB SHADOW E&M-EST. PATIENT-LVL IV: CPT | Mod: PBBFAC,,, | Performed by: FAMILY MEDICINE

## 2022-02-14 PROCEDURE — 99214 PR OFFICE/OUTPT VISIT, EST, LEVL IV, 30-39 MIN: ICD-10-PCS | Mod: S$GLB,,, | Performed by: FAMILY MEDICINE

## 2022-02-14 PROCEDURE — 99214 OFFICE O/P EST MOD 30 MIN: CPT | Mod: S$GLB,,, | Performed by: FAMILY MEDICINE

## 2022-02-14 RX ORDER — INSULIN PUMP SYRINGE, 3 ML
EACH MISCELLANEOUS
Qty: 1 EACH | Refills: 0 | Status: SHIPPED | OUTPATIENT
Start: 2022-02-14 | End: 2024-01-02

## 2022-02-14 RX ORDER — LISINOPRIL 10 MG/1
10 TABLET ORAL DAILY
Qty: 90 TABLET | Refills: 3 | Status: SHIPPED | OUTPATIENT
Start: 2022-02-14 | End: 2022-03-24 | Stop reason: SDUPTHER

## 2022-02-14 RX ORDER — LANCETS
EACH MISCELLANEOUS
Qty: 100 EACH | Refills: 5 | Status: SHIPPED | OUTPATIENT
Start: 2022-02-14 | End: 2022-12-27 | Stop reason: SDUPTHER

## 2022-02-14 NOTE — PROGRESS NOTES
"Subjective:       Patient ID: Yosef Giordano is a 62 y.o. male.    Chief Complaint: Hypertension    Bout of drug-induced pancreatitis in December, taken off Trulicity, feeling fine ever since. Switched to Farxiga, recently increased dose, tolerating fine.  Blood sugar has been a little higher in the morning, >200 today, but typically 150s, lower later in the day  Compliant with all medications.  Denies chest pain, palpitations or shortness of breath.  No dizziness or lightheadedness.    Review of Systems   Constitutional: Negative for activity change and unexpected weight change.   HENT: Negative for hearing loss, rhinorrhea and trouble swallowing.    Eyes: Negative for discharge and visual disturbance.   Respiratory: Negative for chest tightness and wheezing.    Cardiovascular: Negative for chest pain and palpitations.   Gastrointestinal: Negative for blood in stool, constipation, diarrhea and vomiting.   Endocrine: Negative for polydipsia and polyuria.   Genitourinary: Negative for difficulty urinating, hematuria and urgency.   Musculoskeletal: Negative for arthralgias, joint swelling and neck pain.   Neurological: Negative for weakness and headaches.   Psychiatric/Behavioral: Negative for confusion and dysphoric mood.       Objective:      Vitals:    02/14/22 1051   BP: 122/78   BP Location: Right arm   Patient Position: Sitting   BP Method: Large (Manual)   Pulse: 92   Resp: 18   SpO2: 98%   Weight: 116.3 kg (256 lb 8.1 oz)   Height: 5' 9" (1.753 m)     Physical Exam  Vitals and nursing note reviewed.   Constitutional:       Appearance: He is well-developed and well-nourished.   HENT:      Head: Normocephalic and atraumatic.   Neck:      Vascular: No carotid bruit.   Cardiovascular:      Rate and Rhythm: Normal rate and regular rhythm.      Heart sounds: Normal heart sounds.   Pulmonary:      Effort: Pulmonary effort is normal.      Breath sounds: Normal breath sounds.   Abdominal:      General: Bowel sounds " are normal. There is no distension.      Tenderness: There is no abdominal tenderness.   Musculoskeletal:      Right lower leg: No edema.      Left lower leg: No edema.   Skin:     General: Skin is warm and dry.   Neurological:      Mental Status: He is alert and oriented to person, place, and time.   Psychiatric:         Mood and Affect: Mood normal.         Behavior: Behavior normal.         Lab Results   Component Value Date    WBC 11.66 12/23/2021    HGB 15.9 12/23/2021    HCT 46.4 12/23/2021     12/23/2021    CHOL 168 12/15/2021    TRIG 94 12/15/2021    HDL 41 12/15/2021    ALT 27 12/23/2021    AST 20 12/23/2021     02/10/2022    K 3.8 02/10/2022     02/10/2022    CREATININE 0.8 02/10/2022    BUN 15 02/10/2022    CO2 20 (L) 02/10/2022    TSH 2.624 12/15/2021    PSA 4.4 (H) 12/15/2021    HGBA1C 6.3 (H) 12/15/2021      Assessment:       1. Type 2 diabetes mellitus with hyperlipidemia    2. Essential hypertension, benign    3. Severe obesity (BMI 35.0-39.9) with comorbidity    4. Dyslipidemia, goal LDL below 100    5. History of pancreatitis        Plan:       Type 2 diabetes mellitus with hyperlipidemia  -     blood-glucose meter kit; To check BG 1 times daily, to use with insurance preferred meter  Dispense: 1 each; Refill: 0  -     lancets Misc; To check BG 1 times daily, to use with insurance preferred meter  Dispense: 100 each; Refill: 5  -     blood sugar diagnostic Strp; To check BG 1 times daily, to use with insurance preferred meter  Dispense: 100 strip; Refill: 5  Has been under good glycemic control.  Followed by diabetes management.  Continue current meds.  Essential hypertension, benign  -     lisinopriL 10 MG tablet; Take 1 tablet (10 mg total) by mouth once daily.  Dispense: 90 tablet; Refill: 3  Well controlled  Severe obesity (BMI 35.0-39.9) with comorbidity  Low carb diet  Dyslipidemia, goal LDL below 100  Continue statin  History of pancreatitis  Avoid potential causative  agents    Medication List with Changes/Refills   New Medications    BLOOD SUGAR DIAGNOSTIC STRP    To check BG 1 times daily, to use with insurance preferred meter    BLOOD-GLUCOSE METER KIT    To check BG 1 times daily, to use with insurance preferred meter    LANCETS MISC    To check BG 1 times daily, to use with insurance preferred meter   Current Medications    ASPIRIN 81 MG CHEW    Take 1 tablet (81 mg total) by mouth once daily.    DAPAGLIFLOZIN (FARXIGA) 10 MG TABLET    Take 1 tablet (10 mg total) by mouth once daily.    DIAZEPAM (VALIUM) 10 MG TAB    Take 10 mg by mouth 2 (two) times daily as needed.    OMEGA 3-DHA-EPA-FISH OIL (FISH OIL) 1,000 MG (120 MG-180 MG) CAP    Take 1 capsule by mouth 2 (two) times daily.    ONETOUCH ULTRA BLUE TEST STRIP STRP    test two times DAILY    PRAVASTATIN (PRAVACHOL) 40 MG TABLET    Take 1 tablet (40 mg total) by mouth every evening.    SERTRALINE (ZOLOFT) 100 MG TABLET    Take 2 tablets (200 mg total) by mouth once daily.   Changed and/or Refilled Medications    Modified Medication Previous Medication    LISINOPRIL 10 MG TABLET lisinopriL 10 MG tablet       Take 1 tablet (10 mg total) by mouth once daily.    TAKE ONE TABLET BY MOUTH ONCE DAILY

## 2022-03-11 ENCOUNTER — HOSPITAL ENCOUNTER (OUTPATIENT)
Dept: RADIOLOGY | Facility: HOSPITAL | Age: 62
Discharge: HOME | End: 2022-03-11
Attending: NURSE PRACTITIONER
Payer: COMMERCIAL

## 2022-03-11 DIAGNOSIS — R97.20 ELEVATED PSA: ICD-10-CM

## 2022-03-11 PROCEDURE — 72197 MRI PROSTATE W W/O CONTRAST: ICD-10-PCS | Mod: 26,,, | Performed by: RADIOLOGY

## 2022-03-11 PROCEDURE — 72197 MRI PELVIS W/O & W/DYE: CPT | Mod: 26,,, | Performed by: RADIOLOGY

## 2022-03-11 PROCEDURE — 72197 MRI PELVIS W/O & W/DYE: CPT | Mod: TC

## 2022-03-11 PROCEDURE — 25500020 PHARM REV CODE 255: Performed by: NURSE PRACTITIONER

## 2022-03-11 PROCEDURE — A9585 GADOBUTROL INJECTION: HCPCS | Performed by: NURSE PRACTITIONER

## 2022-03-11 RX ORDER — GADOBUTROL 604.72 MG/ML
10 INJECTION INTRAVENOUS
Status: COMPLETED | OUTPATIENT
Start: 2022-03-11 | End: 2022-03-11

## 2022-03-11 RX ADMIN — GADOBUTROL 10 ML: 604.72 INJECTION INTRAVENOUS at 11:03

## 2022-03-13 ENCOUNTER — PATIENT MESSAGE (OUTPATIENT)
Dept: UROLOGY | Facility: CLINIC | Age: 62
End: 2022-03-13
Payer: COMMERCIAL

## 2022-03-14 ENCOUNTER — OFFICE VISIT (OUTPATIENT)
Dept: UROLOGY | Facility: CLINIC | Age: 62
End: 2022-03-14
Payer: COMMERCIAL

## 2022-03-14 DIAGNOSIS — Z80.42 FAMILY HISTORY OF PROSTATE CANCER: ICD-10-CM

## 2022-03-14 DIAGNOSIS — R97.20 ELEVATED PSA: Primary | ICD-10-CM

## 2022-03-14 DIAGNOSIS — N40.0 ENLARGED PROSTATE ON RECTAL EXAMINATION: ICD-10-CM

## 2022-03-14 PROCEDURE — 99442 PR PHYSICIAN TELEPHONE EVALUATION 11-20 MIN: CPT | Mod: 95,,, | Performed by: NURSE PRACTITIONER

## 2022-03-14 PROCEDURE — 99442 PR PHYSICIAN TELEPHONE EVALUATION 11-20 MIN: ICD-10-PCS | Mod: 95,,, | Performed by: NURSE PRACTITIONER

## 2022-03-14 RX ORDER — CIPROFLOXACIN 500 MG/1
500 TABLET ORAL 2 TIMES DAILY
Qty: 4 TABLET | Refills: 0 | Status: SHIPPED | OUTPATIENT
Start: 2022-03-14 | End: 2022-03-16

## 2022-03-14 NOTE — PROGRESS NOTES
Established Patient - Audio Only Telehealth Visit     The patient location is: [place]   The chief complaint leading to consultation is: MRI results   Visit type: Virtual visit with audio only (telephone)  Total time spent with patient: 20 minutes        The reason for the audio only service rather than synchronous audio and video virtual visit was related to technical difficulties or patient preference/necessity.     Each patient to whom I provide medical services by telemedicine is:  (1) informed of the relationship between the physician and patient and the respective role of any other health care provider with respect to management of the patient; and (2) notified that they may decline to receive medical services by telemedicine and may withdraw from such care at any time. Patient verbally consented to receive this service via voice-only telephone call.       HPI: Elevated PSA  Patient is here with an elevated PSA. He has no personal history and a family history of prostate cancer (maternal great grandfather). His AUA Symptom Score is 15/1, manifested as irritative symptoms including frequency, urgency and obstructive symptoms including intermittency. He has no prior genitourinary history of hematuria, hematospermia, prostatitis, UTI, erectile dysfunction, urolithiasis, epididymal orchitis, previous  surgery.  Previous PSA values are :  Lab Results   Component Value Date    PSA 4.4 (H) 12/15/2021    PSA 2.9 09/08/2020    PSATOTAL 6.3 (H) 01/24/2022    PSAFREE 0.84 01/24/2022    PSAFREEPCT 13.33 01/24/2022     MRI:   MRI PROSTATE W W/O CONTRAST     CLINICAL HISTORY:  enlarged prostate, elevated psa; family history of Prostate cancer;  Elevated prostate specific antigen (PSA)     TECHNIQUE:  Multiparametric MRI of the prostate/pelvis performed on a 3T scanner with phase pelvic coil. Multiplanar, multisequence images including high resolution, small field-of-view T2-WI; axial diffusion weighted images with multiple  B-values and creation of ADC-maps; and dynamic contrast enhanced T1-weighted images through the prostate were obtained before, during, and after the administration of 10 cc intravenous gadolinium.     COMPARISON:  CT 12/23/2021.     FINDINGS:  Previous biopsy: None     PSA: 6.3 ng/mL 01/24/2022     Prior therapy: None     Prostate: 5.9 x 5.8 x 6.0 cm corresponding to a computed volume of 95.46 cc.     Peripheral zone:     Lesion (ANUPAM) #P-1     Location: Side: left; Region: base; Zone: posterior peripheral zone medially     Greatest dimension: 1.4 cm     T2-WI: Heterogeneous signal intensity or non-circumscribed, rounded, moderate hypointensity, score 3.     DWI/ADC: Focal (discrete and different from the background) hypointense on ADC and/or focal hyperintense on high b-value DWI; may be markedly hypointense on ADC or markedly hyperintense on high b-value DWI, but not both, score 3.     DCE: Positive     Extraprostatic extension: No definite extraprostatic extension     PI-RADS assessment category: 4     Transitional zone: Benign prostatic hyperplasia without focal suspicious abnormality, score 2.     Neurovascular bundle: Normal appearance.     Seminal vesicles: Normal appearance.     Adjacent Organ Involvement: No evidence for urinary bladder or rectal invasion.     Lymphadenopathy: None.     Other Findings: Colonic diverticulosis.  Small fat containing left inguinal hernia.     Impression:     1. PIRADS 4 lesion in the left base peripheral zone as above.  Overall Assessment: PI-RADS 4 - High (clinically significant cancer is likely to be present)     Number of targets created for potential MR/US fusion biopsy     Peripheral zone: 1     Transition zone: 0     Electronically signed by resident: Rock Hassan  Date:                                            03/11/2022  Time:                                           11:28     Electronically signed by: Jose Armando Ramirez MD  Date:                                             03/11/2022  Time:                                           14:35             Exam Ended: 03/11/22 11:18 Last Resulted: 03/11/22 14:35                Assessment and plan:    --Will proceed with UroNav Biopsy with Dr. Casey next available at [place]   --Stop all blood thinners (Asprin, Plavix, Coumadin, etc) 5 days prior to biopsy.  --Perform fleets enema the morning of procedure.  --Start taking antibiotics the morning prior to procedure.       This service was not originating from a related E/M service provided within the previous 7 days nor will  to an E/M service or procedure within the next 24 hours or my soonest available appointment.  Prevailing standard of care was able to be met in this audio-only visit.

## 2022-03-15 ENCOUNTER — TELEPHONE (OUTPATIENT)
Dept: UROLOGY | Facility: CLINIC | Age: 62
End: 2022-03-15
Payer: COMMERCIAL

## 2022-03-15 DIAGNOSIS — Z80.42 FAMILY HISTORY OF PROSTATE CANCER: ICD-10-CM

## 2022-03-15 DIAGNOSIS — R97.20 ELEVATED PSA: Primary | ICD-10-CM

## 2022-03-16 RX ORDER — SERTRALINE HYDROCHLORIDE 100 MG/1
TABLET, FILM COATED ORAL
Qty: 60 TABLET | Refills: 5 | Status: SHIPPED | OUTPATIENT
Start: 2022-03-16 | End: 2022-04-06 | Stop reason: SDUPTHER

## 2022-03-22 ENCOUNTER — PATIENT OUTREACH (OUTPATIENT)
Dept: ADMINISTRATIVE | Facility: OTHER | Age: 62
End: 2022-03-22
Payer: COMMERCIAL

## 2022-03-22 DIAGNOSIS — E11.69 TYPE 2 DIABETES MELLITUS WITH HYPERLIPIDEMIA: Primary | ICD-10-CM

## 2022-03-22 DIAGNOSIS — E78.5 TYPE 2 DIABETES MELLITUS WITH HYPERLIPIDEMIA: Primary | ICD-10-CM

## 2022-03-22 NOTE — PROGRESS NOTES
LINKS immunization registry updated  Care Everywhere updated  Health Maintenance updated  Chart reviewed for overdue Proactive [facility] Encounters (FRANCISCO) health maintenance testing (CRS, Breast Ca, Diabetic Eye Exam)   Orders entered: diabetic eye screening photo

## 2022-03-24 ENCOUNTER — OFFICE VISIT (OUTPATIENT)
Dept: DIABETES | Facility: CLINIC | Age: 62
End: 2022-03-24
Payer: COMMERCIAL

## 2022-03-24 VITALS
SYSTOLIC BLOOD PRESSURE: 132 MMHG | OXYGEN SATURATION: 98 % | HEIGHT: 69 IN | WEIGHT: 251.63 LBS | HEART RATE: 96 BPM | BODY MASS INDEX: 37.27 KG/M2 | TEMPERATURE: 98 F | DIASTOLIC BLOOD PRESSURE: 82 MMHG

## 2022-03-24 DIAGNOSIS — E66.01 SEVERE OBESITY (BMI 35.0-39.9) WITH COMORBIDITY: ICD-10-CM

## 2022-03-24 DIAGNOSIS — E78.5 DYSLIPIDEMIA, GOAL LDL BELOW 100: ICD-10-CM

## 2022-03-24 DIAGNOSIS — Z87.19 HISTORY OF PANCREATITIS: ICD-10-CM

## 2022-03-24 DIAGNOSIS — E11.29 TYPE 2 DIABETES MELLITUS WITH MICROALBUMINURIA, WITHOUT LONG-TERM CURRENT USE OF INSULIN: Primary | ICD-10-CM

## 2022-03-24 DIAGNOSIS — E11.42 TYPE 2 DIABETES MELLITUS WITH DIABETIC POLYNEUROPATHY, WITHOUT LONG-TERM CURRENT USE OF INSULIN: ICD-10-CM

## 2022-03-24 DIAGNOSIS — E78.1 HYPERTRIGLYCERIDEMIA: ICD-10-CM

## 2022-03-24 DIAGNOSIS — I10 ESSENTIAL HYPERTENSION, BENIGN: ICD-10-CM

## 2022-03-24 DIAGNOSIS — R80.9 TYPE 2 DIABETES MELLITUS WITH MICROALBUMINURIA, WITHOUT LONG-TERM CURRENT USE OF INSULIN: Primary | ICD-10-CM

## 2022-03-24 DIAGNOSIS — E11.65 TYPE 2 DIABETES MELLITUS WITH HYPERGLYCEMIA, WITHOUT LONG-TERM CURRENT USE OF INSULIN: ICD-10-CM

## 2022-03-24 LAB — GLUCOSE SERPL-MCNC: 124 MG/DL (ref 70–110)

## 2022-03-24 PROCEDURE — 99214 OFFICE O/P EST MOD 30 MIN: CPT | Mod: S$GLB,,, | Performed by: NURSE PRACTITIONER

## 2022-03-24 PROCEDURE — 99214 PR OFFICE/OUTPT VISIT, EST, LEVL IV, 30-39 MIN: ICD-10-PCS | Mod: S$GLB,,, | Performed by: NURSE PRACTITIONER

## 2022-03-24 PROCEDURE — 82962 POCT GLUCOSE, HAND-HELD DEVICE: ICD-10-PCS | Mod: S$GLB,,, | Performed by: NURSE PRACTITIONER

## 2022-03-24 PROCEDURE — 99999 PR PBB SHADOW E&M-EST. PATIENT-LVL IV: CPT | Mod: PBBFAC,,, | Performed by: NURSE PRACTITIONER

## 2022-03-24 PROCEDURE — 82962 GLUCOSE BLOOD TEST: CPT | Mod: S$GLB,,, | Performed by: NURSE PRACTITIONER

## 2022-03-24 PROCEDURE — 99999 PR PBB SHADOW E&M-EST. PATIENT-LVL IV: ICD-10-PCS | Mod: PBBFAC,,, | Performed by: NURSE PRACTITIONER

## 2022-03-24 RX ORDER — DAPAGLIFLOZIN 10 MG/1
10 TABLET, FILM COATED ORAL DAILY
Qty: 90 TABLET | Refills: 2 | Status: SHIPPED | OUTPATIENT
Start: 2022-03-24 | End: 2022-09-07 | Stop reason: SDUPTHER

## 2022-03-24 RX ORDER — PRAVASTATIN SODIUM 40 MG/1
40 TABLET ORAL NIGHTLY
Qty: 90 TABLET | Refills: 2 | Status: SHIPPED | OUTPATIENT
Start: 2022-03-24 | End: 2022-12-27 | Stop reason: SDUPTHER

## 2022-03-24 RX ORDER — LISINOPRIL 10 MG/1
10 TABLET ORAL DAILY
Qty: 90 TABLET | Refills: 3 | Status: SHIPPED | OUTPATIENT
Start: 2022-03-24 | End: 2022-12-27 | Stop reason: SDUPTHER

## 2022-03-24 NOTE — ASSESSMENT & PLAN NOTE
Body mass index is 37.16 kg/m².  Increases insulin resistance.   Discussed DM diet and exercise.

## 2022-03-24 NOTE — PROGRESS NOTES
CC:   Chief Complaint   Patient presents with    Diabetes Mellitus       HPI: Yosef Giordano is a 62 y.o. male presents for a follow up visit today for the management of T2DM.     He was diagnosed with Type 2 diabetes in [id number] on routine lab work and initally started on metformin. He reports severe GI upset with Metformin use. Never on insulin therapy.     Family hx of diabetes: sister- prediabetes   Hospitalized for diabetes: denies     No personal or FH of thyroid cancer or personal of pancreatic cancer or pancreatitis.     Our last visit was in December of 2022  At that visit we continued his Trulicity.  Shortly after that visit he had an episode of pancreatitis for which she was seen in the emergency room.  We ended up stopping his Trulicity due to this and starting him on Farxiga daily--titrated up to 10 mg daily  No recent A1c on file  No  complaints with Farxiga  He has not been checking his blood sugars consistently.  Variable readings from 140s to low 200s  Today in clinic is blood sugar is 124.        DIABETES COMPLICATIONS: nephropathy and peripheral neuropathy      Diabetes Management Status    ASA:  Yes- 81 mg daily     Statin: Taking--pravastatin + fish oil   ACE/ARB: Taking--Lisinopril      The 10-year ASCVD risk score (Juan Ramonreece QUINTERO Jr., et al., 2013) is: 23.6%    Values used to calculate the score:      Age: 62 years      Sex: Male      Is Non- : No      Diabetic: Yes      Tobacco smoker: No      Systolic Blood Pressure: 132 mmHg      Is BP treated: Yes      HDL Cholesterol: 41 mg/dL      Total Cholesterol: 164 mg/dL      Screening or Prevention Patient's value Goal Complete/Controlled?   HgA1C Testing and Control   Lab Results   Component Value Date    HGBA1C 7.0 (H) 03/28/2022      Annually/Less than 8% No   Lipid profile : 03/28/2022 Annually Yes   LDL control Lab Results   Component Value Date    LDLCALC 97.8 03/28/2022    Annually/Less than 100 mg/dl  Yes    Nephropathy screening Lab Results   Component Value Date    LABMICR 51.0 03/28/2022     Lab Results   Component Value Date    PROTEINUA 1+ (A) 01/02/2019    Annually Yes   Blood pressure BP Readings from Last 1 Encounters:   03/24/22 132/82    Less than 140/90 Yes   Dilated retinal exam : 03/19/2021 - external Dr. Melendrez  Annually No   Foot exam   : 12/22/2021 Annually Yes       CURRENT A1C:    Hemoglobin A1C   Date Value Ref Range Status   03/28/2022 7.0 (H) 4.0 - 5.6 % Final     Comment:     ADA Screening Guidelines:  5.7-6.4%  Consistent with prediabetes  >or=6.5%  Consistent with diabetes    High levels of fetal hemoglobin interfere with the HbA1C  assay. Heterozygous hemoglobin variants (HbS, HgC, etc)do  not significantly interfere with this assay.   However, presence of multiple variants may affect accuracy.     12/15/2021 6.3 (H) 4.0 - 5.6 % Final     Comment:     ADA Screening Guidelines:  5.7-6.4%  Consistent with prediabetes  >or=6.5%  Consistent with diabetes    High levels of fetal hemoglobin interfere with the HbA1C  assay. Heterozygous hemoglobin variants (HbS, HgC, etc)do  not significantly interfere with this assay.   However, presence of multiple variants may affect accuracy.     05/03/2021 6.5 (H) 4.0 - 5.6 % Final     Comment:     ADA Screening Guidelines:  5.7-6.4%  Consistent with prediabetes  >or=6.5%  Consistent with diabetes    High levels of fetal hemoglobin interfere with the HbA1C  assay. Heterozygous hemoglobin variants (HbS, HgC, etc)do  not significantly interfere with this assay.   However, presence of multiple variants may affect accuracy.         GOAL A1C: 6.5% without hypoglycemia       DM MEDICATIONS USED IN THE PAST: Metformin- severe GI upset   Glipizide   Trulicity-- off due to pancreatitis   Farxiga       CURRENT DIABETES MEDICATIONS:   Farxiga 10 mg daily   Insulin: N/A   Missed doses: denies      BLOOD GLUCOSE MONITORING: He is periodically checking his BG   This AM it was  146   Per oral recall: in the AM -- 200, 205, 150  Afternoon BG readings are improving       Results for orders placed or performed in visit on 03/24/22   POCT Glucose, Hand-Held Device   Result Value Ref Range    POC Glucose 124 (A) 70 - 110 MG/DL         HYPOGLYCEMIA:  No        MEALS: eating 2-3 meals per day -- usually eating 2 meals   BF: turkey sausage and eggs -- sometimes skips   Lunch: usually what he can find- baked chicken or eggs   Dinner: grilled meat with mashed potatoes or pasta - corn - sometimes salads with ranch   Snack: SF pudding or pickles  Drinks: mostly water   Rarely has soft drinks  Sweet tea occ with eating out   No juices or sodas        CURRENT EXERCISE:  Going to the Gym - M-F in the AM-- trying to go 5 days per week        Review of Systems  Review of Systems   Constitutional: Negative for appetite change, fatigue and unexpected weight change.   HENT: Negative for trouble swallowing.    Eyes: Negative for visual disturbance.   Respiratory: Negative for shortness of breath.    Cardiovascular: Negative for chest pain.   Gastrointestinal: Negative for nausea.   Endocrine: Negative for polydipsia, polyphagia and polyuria.   Genitourinary:        No Nocturia    Musculoskeletal: Positive for arthralgias (left arm sometimes- believes it is a pinched nerve ).   Skin: Negative for wound.   Neurological: Negative for numbness.       Physical Exam   Physical Exam  Vitals and nursing note reviewed.   Constitutional:       Appearance: He is well-developed. He is obese.   HENT:      Head: Normocephalic and atraumatic.      Right Ear: External ear normal.      Left Ear: External ear normal.      Nose: Nose normal.   Neck:      Thyroid: No thyromegaly.      Trachea: No tracheal deviation.   Cardiovascular:      Rate and Rhythm: Normal rate and regular rhythm.      Heart sounds: No murmur heard.  Pulmonary:      Effort: Pulmonary effort is normal. No respiratory distress.      Breath sounds: Normal  breath sounds.   Abdominal:      Palpations: Abdomen is soft.      Tenderness: There is no abdominal tenderness.      Hernia: No hernia is present.   Musculoskeletal:      Cervical back: Normal range of motion and neck supple.   Skin:     General: Skin is warm and dry.      Capillary Refill: Capillary refill takes less than 2 seconds.      Findings: No rash.   Neurological:      Mental Status: He is alert and oriented to person, place, and time.      Cranial Nerves: No cranial nerve deficit.   Psychiatric:         Behavior: Behavior normal.         Judgment: Judgment normal.           FOOT EXAMINATION: Appropriate footwear       Lab Results   Component Value Date    TSH 2.624 12/15/2021           Type 2 diabetes mellitus with microalbuminuria, without long-term current use of insulin  Due for labs   + variable blood sugar readings  Will make further adjustments once A1c results  Blood sugar was reasonable in clinic today  Consider professional CGM if needed    Adhesive issues with the freestyle selam -- wishes to remain off of it.       Remain off of Trulicity due to pancreatitis      -- Medication Changes:  For now continue Farxiga 10 mg daily  Consider adding back in glipizide 2.5-5 mg daily pending A1c results       -- Reviewed goals of therapy are to get the best control we can without hypoglycemia.    -- Advised frequent self blood glucose monitoring.  Patient encouraged to document glucose results and bring them to every clinic visit. - daily at alternating times  -- Hypoglycemia precautions discussed. Instructed on precautions before driving.    -- Call for Bg repeatedly < 70 or > 180.   -- Close adherence to lifestyle changes recommended.   -- Periodic follow ups for eye evaluations, foot care and dental care suggested.    Encouraged patient to schedule eye exam with Dr. Melendrez       Regarding microalbuminuria:  Optimize BG readings.   See above.   On ACE-inhibitor    Type 2 diabetes mellitus with diabetic  polyneuropathy, without long-term current use of insulin  Optimize BG readings.   See above.       Educated patient to check feet daily for any foreign objects and/or wounds. Discussed with patient the importance of wearing appropriate footwear at all times, not to walk barefoot ever, and to check shoes before putting them on feet. Instructed patient to keep feet dry by regularly changing shoes and socks and drying feet after baths and exercises. Also, instructed patient to report any new lesions, discolorations, or swelling to a healthcare professional.        Severe obesity (BMI 35.0-39.9) with comorbidity  Body mass index is 37.16 kg/m².  Increases insulin resistance.   Discussed DM diet and exercise.       Hypertriglyceridemia  On Statin    Taking over-the-counter fish oil  Check lipids on Monday     Dyslipidemia, goal LDL below 100  On statin per ADA recommendations  LDL goal < 100.   Check lipids       Essential hypertension, benign  BP goal is < 140/90.   Tolerating ACEi  Controlled   Blood pressure goals discussed with patient      History of pancreatitis  Avoiding GLP1 and DPP4       Addendum 03/28/2022  A1c resulted at 7%  Reported at visit blood sugar readings are above goal  Will add in glipizide 2.5 mg daily   Follow-up in 6 months      Follow up in about 6 months (around 9/24/2022).  He will get fasting labs and urine done on Monday.        Orders Placed This Encounter   Procedures    POCT Glucose, Hand-Held Device       Recommendations were discussed with the patient in detail  The patient verbalized understanding and agrees with the plan outlined as above.     This note was partly generated with [employer] voice recognition software. I apologize for any possible typographical errors.

## 2022-03-24 NOTE — ASSESSMENT & PLAN NOTE
Due for labs   + variable blood sugar readings  Will make further adjustments once A1c results  Blood sugar was reasonable in clinic today  Consider professional CGM if needed    Adhesive issues with the freestyle selam -- wishes to remain off of it.       Remain off of Trulicity due to pancreatitis      -- Medication Changes:  For now continue Farxiga 10 mg daily  Consider adding back in glipizide 2.5-5 mg daily pending A1c results       -- Reviewed goals of therapy are to get the best control we can without hypoglycemia.    -- Advised frequent self blood glucose monitoring.  Patient encouraged to document glucose results and bring them to every clinic visit. - daily at alternating times  -- Hypoglycemia precautions discussed. Instructed on precautions before driving.    -- Call for Bg repeatedly < 70 or > 180.   -- Close adherence to lifestyle changes recommended.   -- Periodic follow ups for eye evaluations, foot care and dental care suggested.    Encouraged patient to schedule eye exam with Dr. Melendrez       Regarding microalbuminuria:  Optimize BG readings.   See above.   On ACE-inhibitor

## 2022-03-28 ENCOUNTER — PATIENT MESSAGE (OUTPATIENT)
Dept: DIABETES | Facility: CLINIC | Age: 62
End: 2022-03-28
Payer: COMMERCIAL

## 2022-03-28 ENCOUNTER — TELEPHONE (OUTPATIENT)
Dept: DIABETES | Facility: CLINIC | Age: 62
End: 2022-03-28
Payer: COMMERCIAL

## 2022-03-31 LAB
LEFT EYE DM RETINOPATHY: NEGATIVE
RIGHT EYE DM RETINOPATHY: NEGATIVE

## 2022-04-05 ENCOUNTER — PATIENT MESSAGE (OUTPATIENT)
Dept: DIABETES | Facility: CLINIC | Age: 62
End: 2022-04-05
Payer: COMMERCIAL

## 2022-04-06 ENCOUNTER — PATIENT MESSAGE (OUTPATIENT)
Dept: PRIMARY CARE CLINIC | Facility: CLINIC | Age: 62
End: 2022-04-06
Payer: COMMERCIAL

## 2022-04-06 RX ORDER — SERTRALINE HYDROCHLORIDE 100 MG/1
200 TABLET, FILM COATED ORAL DAILY
Qty: 180 TABLET | Refills: 3 | Status: SHIPPED | OUTPATIENT
Start: 2022-04-06 | End: 2023-05-24

## 2022-04-06 NOTE — TELEPHONE ENCOUNTER
No new care gaps identified.  Powered by [employer]. Reference number: [id number].   4/06/2022 10:38:44 AM CDT

## 2022-04-08 ENCOUNTER — PATIENT MESSAGE (OUTPATIENT)
Dept: DIABETES | Facility: CLINIC | Age: 62
End: 2022-04-08
Payer: COMMERCIAL

## 2022-04-21 ENCOUNTER — TELEPHONE (OUTPATIENT)
Dept: UROLOGY | Facility: CLINIC | Age: 62
End: 2022-04-21
Payer: COMMERCIAL

## 2022-04-25 ENCOUNTER — PROCEDURE VISIT (OUTPATIENT)
Dept: UROLOGY | Facility: CLINIC | Age: 62
End: 2022-04-25
Payer: COMMERCIAL

## 2022-04-25 VITALS
DIASTOLIC BLOOD PRESSURE: 74 MMHG | RESPIRATION RATE: 17 BRPM | SYSTOLIC BLOOD PRESSURE: 132 MMHG | WEIGHT: 252.63 LBS | HEIGHT: 69 IN | HEART RATE: 95 BPM | BODY MASS INDEX: 37.42 KG/M2 | TEMPERATURE: 98 F

## 2022-04-25 DIAGNOSIS — Z80.42 FAMILY HISTORY OF PROSTATE CANCER: ICD-10-CM

## 2022-04-25 DIAGNOSIS — R97.20 ELEVATED PSA: ICD-10-CM

## 2022-04-25 PROCEDURE — 96372 THER/PROPH/DIAG INJ SC/IM: CPT | Mod: 59,S$GLB,, | Performed by: UROLOGY

## 2022-04-25 PROCEDURE — 55700 TRANSRECTAL ULTRASOUND W/ BIOPSY: CPT | Mod: S$GLB,,, | Performed by: UROLOGY

## 2022-04-25 PROCEDURE — 88305 TISSUE EXAM BY PATHOLOGIST: CPT | Performed by: PATHOLOGY

## 2022-04-25 PROCEDURE — 88344 IMHCHEM/IMCYTCHM EA MLT ANTB: CPT | Mod: 26,,, | Performed by: PATHOLOGY

## 2022-04-25 PROCEDURE — 88344 PR IHC OR ICC EACH MULTIPLEX ANTIBODY STAIN PROCEDURE: ICD-10-PCS | Mod: 26,,, | Performed by: PATHOLOGY

## 2022-04-25 PROCEDURE — 55700 TRANSRECTAL ULTRASOUND W/ BIOPSY: ICD-10-PCS | Mod: S$GLB,,, | Performed by: UROLOGY

## 2022-04-25 PROCEDURE — 88344 IMHCHEM/IMCYTCHM EA MLT ANTB: CPT | Mod: 59 | Performed by: PATHOLOGY

## 2022-04-25 PROCEDURE — 76872 TRANSRECTAL ULTRASOUND W/ BIOPSY: ICD-10-PCS | Mod: 26,S$GLB,, | Performed by: UROLOGY

## 2022-04-25 PROCEDURE — 88305 TISSUE EXAM BY PATHOLOGIST: ICD-10-PCS | Mod: 26,,, | Performed by: PATHOLOGY

## 2022-04-25 PROCEDURE — 96372 PR INJECTION,THERAP/PROPH/DIAG2ST, IM OR SUBCUT: ICD-10-PCS | Mod: 59,S$GLB,, | Performed by: UROLOGY

## 2022-04-25 PROCEDURE — 88305 TISSUE EXAM BY PATHOLOGIST: CPT | Mod: 26,,, | Performed by: PATHOLOGY

## 2022-04-25 PROCEDURE — 76872 US TRANSRECTAL: CPT | Mod: 26,S$GLB,, | Performed by: UROLOGY

## 2022-04-25 RX ORDER — LIDOCAINE HYDROCHLORIDE 10 MG/ML
20 INJECTION INFILTRATION; PERINEURAL
Status: COMPLETED | OUTPATIENT
Start: 2022-04-25 | End: 2022-04-25

## 2022-04-25 RX ORDER — CEFTRIAXONE 1 G/1
1 INJECTION, POWDER, FOR SOLUTION INTRAMUSCULAR; INTRAVENOUS
Status: COMPLETED | OUTPATIENT
Start: 2022-04-25 | End: 2022-04-25

## 2022-04-25 RX ORDER — LIDOCAINE HYDROCHLORIDE 20 MG/ML
JELLY TOPICAL ONCE
Status: COMPLETED | OUTPATIENT
Start: 2022-04-25 | End: 2022-04-25

## 2022-04-25 RX ADMIN — CEFTRIAXONE 1 G: 1 INJECTION, POWDER, FOR SOLUTION INTRAMUSCULAR; INTRAVENOUS at 12:04

## 2022-04-25 RX ADMIN — LIDOCAINE HYDROCHLORIDE 20 ML: 10 INJECTION INFILTRATION; PERINEURAL at 12:04

## 2022-04-25 RX ADMIN — LIDOCAINE HYDROCHLORIDE: 20 JELLY TOPICAL at 12:04

## 2022-04-25 NOTE — PROCEDURES
"Transrectal Ultrasound w/ Biopsy    Date/Time: 4/25/2022 1:00 PM  Performed by: Trae Casey MD  Authorized by: Candie Phillips NP     Consent Done?:  Yes (Written)  Time out: Immediately prior to procedure a "time out" was called to verify the correct patient, procedure, equipment, support staff and site/side marked as required.    Preparation: Patient was prepped and draped in usual sterile fashion    Anesthesia:  Lidocaine jelly and 20cc's 1% Lidocaine  Patient sedated: No    Prostate Size:  115  Lesions:: Yes         Type:  Hypoechoic  Left Base Biopsies: 2  Left Mid Biopsies: 2  Left [place] Biopsies: 2  Right Base Biopsies: 2  Right Mid Biopsies: 2  Right [place] Biopsies: 2  Total Biopsies:  12    Patient tolerance:  Patient tolerated the procedure well with no immediate complications     Urine cs neg    MR and US imaged segmented and co-registered with uronav  Coregistration was not optimatal but PIRADS4 lesion was visible with TRUS; this was sampled with the target biopsies and the biopsies from the left base    2 additional cores taken from target for total of 14 cores    Urine cs neg  cipro and enemas and gent done      Standard instructions given  RTC 2 weeks to discuss path with me at [facility]      "

## 2022-04-25 NOTE — PATIENT INSTRUCTIONS
What to Expect After a Prostate Biopsy    Please be sure to finish your pre-procedure antibiotics as instructed.    You may have mild bleeding from the rectum or urine for about 1 week to 1 month, or in your ejaculate for several months. This bleeding is normal and expected, and it will stop. You may have mild discomfort in your rectal or urethral area for 24-48 hours.    You cannot do any strenuous lifting, straining, or exercising for 24 hours. You may return to full activity the day after the biopsy.    You may continue to take all your regular medications after the procedure except for the blood thinners.    You may resume all blood-thinning medications once you no longer see any bleeding or whenever your physician prescribing the medication says it is all right to do so. You may take Tylenol if you have a fever and your temperature is less than 100° F or if you have some discomfort.    You will receive a call from the Urology Department at [facility] with the results of your prostate biopsy within one week.    Signs and Symptoms to Report    Call your [facility] urologist at [phone] if you develop any of the following:  Temperature greater than 101°  F  Inability to urinate  A large amount of bleeding from the rectum or in the urine  Persistent or severe pain    After hours or on weekends, you may reach a urology resident on call at this number: [phone].

## 2022-05-02 LAB
FINAL PATHOLOGIC DIAGNOSIS: NORMAL
Lab: NORMAL

## 2022-05-09 NOTE — PROGRESS NOTES
Subjective:      Yosef Giordano is a 62 y.o. male who returns today regarding his     Here to discuss path    96g  BPH    +LUTS    The following portions of the patient's history were reviewed and updated as appropriate: allergies, current medications, past family history, past medical history, past social history, past surgical history and problem list.    Review of Systems  Pertinent items are noted in HPI.  A comprehensive multipoint review of systems was negative except as otherwise stated in the HPI.    Past Medical History:   Diagnosis Date    Anxiety     Diabetes mellitus, type 2     Hyperlipidemia     Hypertension      Past Surgical History:   Procedure Laterality Date    KNEE ARTHROPLASTY       Diabetes Medications             dapagliflozin (FARXIGA) 10 mg tablet Take 1 tablet (10 mg total) by mouth once daily.    glipiZIDE (GLUCOTROL) 2.5 MG TR24 Take 1 tablet (2.5 mg total) by mouth daily with breakfast.        Review of patient's allergies indicates:   Allergen Reactions    Trulicity [dulaglutide] Other (See Comments)     Pancreatitis           Objective:   Vitals: There were no vitals taken for this visit.    Physical Exam   General: alert and oriented, no acute distress  Respiratory: Symmetric expansion, non-labored breathing  Cardiovascular: no peripheral edema  Abdomen: soft, non distended  Skin: normal coloration and turgor, no rashes, no suspicious skin lesions noted  Neuro: no gross deficits  Psych: normal judgment and insight, normal mood/affect and non-anxious    Physical Exam    Lab Review   Urinalysis demonstrates no specimen    Lab Results   Component Value Date    WBC 11.66 12/23/2021    HGB 15.9 12/23/2021    HCT 46.4 12/23/2021    MCV 84 12/23/2021     12/23/2021     Lab Results   Component Value Date    CREATININE 1.0 03/28/2022    BUN 15 03/28/2022     Lab Results   Component Value Date    PSA 4.4 (H) 12/15/2021    PSA 2.9 09/08/2020    PSATOTAL 6.3 (H) 01/24/2022     PSAFREE 0.84 01/24/2022    PSAFREEPCT 13.33 01/24/2022     Final Pathologic Diagnosis RELIAPATH DIAGNOSIS:   A.   PROSTATE, LEFT APEX, NEEDLE BIOPSY:          Benign prostatic tissue.  (R97.20)   B.   PROSTATE, LEFT MID, NEEDLE BIOPSY:          Benign prostatic tissue, see comment.  (R97.20)   C.   PROSTATE, LEFT BASE, NEEDLE BIOPSY:          Benign prostatic tissue.  (R97.20)   D.   PROSTATE, RIGHT APEX, NEEDLE BIOPSY:          Benign prostatic tissue, see comment.  (R97.20)   E.   PROSTATE, RIGHT MID, NEEDLE BIOPSY:          Benign prostatic tissue, see comment.  (R97.20   F.   PROSTATE, RIGHTBASE, NEEDLE BIOPSY:          Benign prostatic tissue.  (R97.20   G.   TISSUE1          Benign prostatic tissue, see comment.  (R97.20)          Imaging  MRI PROSTATE W W/O CONTRAST     CLINICAL HISTORY:  enlarged prostate, elevated psa; family history of Prostate cancer;  Elevated prostate specific antigen (PSA)     TECHNIQUE:  Multiparametric MRI of the prostate/pelvis performed on a 3T scanner with phase pelvic coil. Multiplanar, multisequence images including high resolution, small field-of-view T2-WI; axial diffusion weighted images with multiple B-values and creation of ADC-maps; and dynamic contrast enhanced T1-weighted images through the prostate were obtained before, during, and after the administration of 10 cc intravenous gadolinium.     COMPARISON:  CT 12/23/2021.     FINDINGS:  Previous biopsy: None     PSA: 6.3 ng/mL 01/24/2022     Prior therapy: None     Prostate: 5.9 x 5.8 x 6.0 cm corresponding to a computed volume of 95.46 cc.     Peripheral zone:     Lesion (ANUPAM) #P-1     Location: Side: left; Region: base; Zone: posterior peripheral zone medially     Greatest dimension: 1.4 cm     T2-WI: Heterogeneous signal intensity or non-circumscribed, rounded, moderate hypointensity, score 3.     DWI/ADC: Focal (discrete and different from the background) hypointense on ADC and/or focal hyperintense on high b-value  DWI; may be markedly hypointense on ADC or markedly hyperintense on high b-value DWI, but not both, score 3.     DCE: Positive     Extraprostatic extension: No definite extraprostatic extension     PI-RADS assessment category: 4     Transitional zone: Benign prostatic hyperplasia without focal suspicious abnormality, score 2.     Neurovascular bundle: Normal appearance.     Seminal vesicles: Normal appearance.     Adjacent Organ Involvement: No evidence for urinary bladder or rectal invasion.     Lymphadenopathy: None.     Other Findings: Colonic diverticulosis.  Small fat containing left inguinal hernia.     Impression:     1. PIRADS 4 lesion in the left base peripheral zone as above.  Overall Assessment: PI-RADS 4 - High (clinically significant cancer is likely to be present)     Number of targets created for potential MR/US fusion biopsy     Peripheral zone: 1     Transition zone: 0     Electronically signed by resident: Rock Hassan  Date:                                            03/11/2022  Time:                                           11:28     Electronically signed by: Jose Armando Ramirez MD  Date:                                            03/11/2022  Time:                                           14:35    Assessment and Plan:   BPH; 96g  Add avodart    Elevated PSA  Add avodart    Family history of prostate cancer  Add avodart      RTC 6 months with AUASS, uroflow, PVR to see Dr Robb at [facility]

## 2022-05-10 ENCOUNTER — OFFICE VISIT (OUTPATIENT)
Dept: UROLOGY | Facility: CLINIC | Age: 62
End: 2022-05-10
Payer: COMMERCIAL

## 2022-05-10 VITALS
SYSTOLIC BLOOD PRESSURE: 140 MMHG | WEIGHT: 252.63 LBS | HEIGHT: 69 IN | HEART RATE: 102 BPM | DIASTOLIC BLOOD PRESSURE: 89 MMHG | BODY MASS INDEX: 37.42 KG/M2

## 2022-05-10 DIAGNOSIS — R97.20 ELEVATED PSA: ICD-10-CM

## 2022-05-10 DIAGNOSIS — N40.0 ENLARGED PROSTATE ON RECTAL EXAMINATION: Primary | ICD-10-CM

## 2022-05-10 DIAGNOSIS — Z80.42 FAMILY HISTORY OF PROSTATE CANCER: ICD-10-CM

## 2022-05-10 PROCEDURE — 99999 PR PBB SHADOW E&M-EST. PATIENT-LVL III: CPT | Mod: PBBFAC,,, | Performed by: UROLOGY

## 2022-05-10 PROCEDURE — 99214 PR OFFICE/OUTPT VISIT, EST, LEVL IV, 30-39 MIN: ICD-10-PCS | Mod: S$GLB,,, | Performed by: UROLOGY

## 2022-05-10 PROCEDURE — 99214 OFFICE O/P EST MOD 30 MIN: CPT | Mod: S$GLB,,, | Performed by: UROLOGY

## 2022-05-10 PROCEDURE — 99999 PR PBB SHADOW E&M-EST. PATIENT-LVL III: ICD-10-PCS | Mod: PBBFAC,,, | Performed by: UROLOGY

## 2022-05-10 RX ORDER — FINASTERIDE 5 MG/1
5 TABLET, FILM COATED ORAL DAILY
Qty: 30 TABLET | Refills: 11 | Status: SHIPPED | OUTPATIENT
Start: 2022-05-10 | End: 2022-11-11 | Stop reason: SDUPTHER

## 2022-05-31 ENCOUNTER — PATIENT MESSAGE (OUTPATIENT)
Dept: ADMINISTRATIVE | Facility: HOSPITAL | Age: 62
End: 2022-05-31
Payer: COMMERCIAL

## 2022-06-02 ENCOUNTER — PATIENT OUTREACH (OUTPATIENT)
Dept: ADMINISTRATIVE | Facility: HOSPITAL | Age: 62
End: 2022-06-02
Payer: COMMERCIAL

## 2022-06-02 NOTE — LETTER
AUTHORIZATION FOR RELEASE OF   CONFIDENTIAL INFORMATION    Dear Dr. Melendrez,    We are seeing Yosef Giordano, date of birth 1960, in the clinic at [facility]. Sami Hernadez MD is the patient's PCP. Yosef Giordano has an outstanding lab/procedure at the time we reviewed his chart. In order to help keep his health information updated, he has authorized us to request the following medical record(s):        (  )  MAMMOGRAM                                      (  )  COLONOSCOPY      (  )  PAP SMEAR                                          (  )  OUTSIDE LAB RESULTS     (  )  DEXA SCAN                                          ( x )  EYE EXAM            (  )  FOOT EXAM                                          (  )  ENTIRE RECORD     (  )  OUTSIDE IMMUNIZATIONS                 (  )  _______________         Please fax records to [facility], Ryan M Truxillo, MD, [phone]    Patient Name: Yosef Giordano  : 1960  Patient Phone #: [phone]

## 2022-06-20 ENCOUNTER — PATIENT OUTREACH (OUTPATIENT)
Dept: ADMINISTRATIVE | Facility: HOSPITAL | Age: 62
End: 2022-06-20
Payer: COMMERCIAL

## 2022-09-07 DIAGNOSIS — R80.9 TYPE 2 DIABETES MELLITUS WITH MICROALBUMINURIA, WITHOUT LONG-TERM CURRENT USE OF INSULIN: ICD-10-CM

## 2022-09-07 DIAGNOSIS — E11.29 TYPE 2 DIABETES MELLITUS WITH MICROALBUMINURIA, WITHOUT LONG-TERM CURRENT USE OF INSULIN: ICD-10-CM

## 2022-09-07 RX ORDER — DAPAGLIFLOZIN 10 MG/1
10 TABLET, FILM COATED ORAL DAILY
Qty: 90 TABLET | Refills: 1 | Status: SHIPPED | OUTPATIENT
Start: 2022-09-07 | End: 2022-12-27 | Stop reason: SDUPTHER

## 2022-10-13 ENCOUNTER — TELEPHONE (OUTPATIENT)
Dept: DIABETES | Facility: CLINIC | Age: 62
End: 2022-10-13
Payer: COMMERCIAL

## 2022-10-28 ENCOUNTER — TELEPHONE (OUTPATIENT)
Dept: DIABETES | Facility: CLINIC | Age: 62
End: 2022-10-28
Payer: COMMERCIAL

## 2022-10-28 ENCOUNTER — PATIENT MESSAGE (OUTPATIENT)
Dept: DIABETES | Facility: CLINIC | Age: 62
End: 2022-10-28
Payer: COMMERCIAL

## 2022-11-03 ENCOUNTER — PATIENT MESSAGE (OUTPATIENT)
Dept: UROLOGY | Facility: CLINIC | Age: 62
End: 2022-11-03
Payer: COMMERCIAL

## 2022-11-11 ENCOUNTER — OFFICE VISIT (OUTPATIENT)
Dept: UROLOGY | Facility: CLINIC | Age: 62
End: 2022-11-11
Payer: COMMERCIAL

## 2022-11-11 VITALS
BODY MASS INDEX: 37.33 KG/M2 | HEIGHT: 69 IN | WEIGHT: 252 LBS | SYSTOLIC BLOOD PRESSURE: 129 MMHG | HEART RATE: 85 BPM | DIASTOLIC BLOOD PRESSURE: 83 MMHG

## 2022-11-11 DIAGNOSIS — N40.0 ENLARGED PROSTATE ON RECTAL EXAMINATION: Primary | ICD-10-CM

## 2022-11-11 DIAGNOSIS — R97.20 ELEVATED PSA: ICD-10-CM

## 2022-11-11 PROCEDURE — 99999 PR PBB SHADOW E&M-EST. PATIENT-LVL III: CPT | Mod: PBBFAC,,, | Performed by: UROLOGY

## 2022-11-11 PROCEDURE — 99999 PR PBB SHADOW E&M-EST. PATIENT-LVL III: ICD-10-PCS | Mod: PBBFAC,,, | Performed by: UROLOGY

## 2022-11-11 PROCEDURE — 99214 OFFICE O/P EST MOD 30 MIN: CPT | Mod: S$GLB,,, | Performed by: UROLOGY

## 2022-11-11 PROCEDURE — 99214 PR OFFICE/OUTPT VISIT, EST, LEVL IV, 30-39 MIN: ICD-10-PCS | Mod: S$GLB,,, | Performed by: UROLOGY

## 2022-11-11 RX ORDER — FINASTERIDE 5 MG/1
5 TABLET, FILM COATED ORAL DAILY
Qty: 30 TABLET | Refills: 11 | Status: SHIPPED | OUTPATIENT
Start: 2022-11-11 | End: 2023-05-29

## 2022-11-11 NOTE — PROGRESS NOTES
"Subjective:      Yosef Giordano is a 62 y.o. male who returns today regarding his BPH.    Previously seen by Dr. Casey. Had TRUS/bx earlier this year that was benign; volume = 96 grams. Added finasteride 6 months ago for LUTS.    Today he reports improved LUTS since last visit. His AUASS is 10/2.     The following portions of the patient's history were reviewed and updated as appropriate: allergies, current medications, past family history, past medical history, past social history, past surgical history and problem list.    Review of Systems  A comprehensive multipoint review of systems was negative except as otherwise stated in the HPI.     Objective:   Vitals: /83   Pulse 85   Ht 5' 9" (1.753 m)   Wt 114.3 kg (252 lb)   BMI 37.21 kg/m²     Physical Exam   General: alert and oriented, no acute distress  Respiratory: Symmetric expansion, non-labored breathing  Neuro: no gross deficits  Psych: normal judgment and insight, normal mood/affect, and non-anxious    Lab Review     Lab Results   Component Value Date    WBC 11.66 12/23/2021    HGB 15.9 12/23/2021    HCT 46.4 12/23/2021    MCV 84 12/23/2021     12/23/2021     Lab Results   Component Value Date    CREATININE 1.0 03/28/2022    BUN 15 03/28/2022     Lab Results   Component Value Date    PSA 4.4 (H) 12/15/2021    PSADIAG 3.2 11/10/2022     Assessment and Plan:   1. BPH with LUTS  2. Elevated PSA  -- Continue proscar - wishes to defer other intervention for now  -- FU 12 mos w/ PSA      "

## 2022-12-27 ENCOUNTER — OFFICE VISIT (OUTPATIENT)
Dept: DIABETES | Facility: CLINIC | Age: 62
End: 2022-12-27
Payer: COMMERCIAL

## 2022-12-27 VITALS
SYSTOLIC BLOOD PRESSURE: 138 MMHG | WEIGHT: 264.31 LBS | HEART RATE: 97 BPM | DIASTOLIC BLOOD PRESSURE: 79 MMHG | HEIGHT: 69 IN | BODY MASS INDEX: 39.15 KG/M2

## 2022-12-27 DIAGNOSIS — E11.29 TYPE 2 DIABETES MELLITUS WITH MICROALBUMINURIA, WITHOUT LONG-TERM CURRENT USE OF INSULIN: Primary | ICD-10-CM

## 2022-12-27 DIAGNOSIS — I10 ESSENTIAL HYPERTENSION, BENIGN: ICD-10-CM

## 2022-12-27 DIAGNOSIS — Z71.9 HEALTH EDUCATION/COUNSELING: ICD-10-CM

## 2022-12-27 DIAGNOSIS — E78.5 DYSLIPIDEMIA, GOAL LDL BELOW 100: ICD-10-CM

## 2022-12-27 DIAGNOSIS — E11.69 TYPE 2 DIABETES MELLITUS WITH HYPERLIPIDEMIA: ICD-10-CM

## 2022-12-27 DIAGNOSIS — E78.5 TYPE 2 DIABETES MELLITUS WITH HYPERLIPIDEMIA: ICD-10-CM

## 2022-12-27 DIAGNOSIS — Z87.19 HISTORY OF PANCREATITIS: ICD-10-CM

## 2022-12-27 DIAGNOSIS — R80.9 TYPE 2 DIABETES MELLITUS WITH MICROALBUMINURIA, WITHOUT LONG-TERM CURRENT USE OF INSULIN: Primary | ICD-10-CM

## 2022-12-27 DIAGNOSIS — E11.42 TYPE 2 DIABETES MELLITUS WITH DIABETIC POLYNEUROPATHY, WITHOUT LONG-TERM CURRENT USE OF INSULIN: ICD-10-CM

## 2022-12-27 DIAGNOSIS — E66.01 SEVERE OBESITY (BMI 35.0-39.9) WITH COMORBIDITY: ICD-10-CM

## 2022-12-27 DIAGNOSIS — E78.1 HYPERTRIGLYCERIDEMIA: ICD-10-CM

## 2022-12-27 PROCEDURE — 99999 PR PBB SHADOW E&M-EST. PATIENT-LVL IV: ICD-10-PCS | Mod: PBBFAC,,, | Performed by: NURSE PRACTITIONER

## 2022-12-27 PROCEDURE — 99999 PR PBB SHADOW E&M-EST. PATIENT-LVL IV: CPT | Mod: PBBFAC,,, | Performed by: NURSE PRACTITIONER

## 2022-12-27 PROCEDURE — 99214 OFFICE O/P EST MOD 30 MIN: CPT | Mod: S$GLB,,, | Performed by: NURSE PRACTITIONER

## 2022-12-27 PROCEDURE — 99214 PR OFFICE/OUTPT VISIT, EST, LEVL IV, 30-39 MIN: ICD-10-PCS | Mod: S$GLB,,, | Performed by: NURSE PRACTITIONER

## 2022-12-27 RX ORDER — LISINOPRIL 10 MG/1
10 TABLET ORAL DAILY
Qty: 90 TABLET | Refills: 2 | Status: SHIPPED | OUTPATIENT
Start: 2022-12-27 | End: 2023-06-27 | Stop reason: SDUPTHER

## 2022-12-27 RX ORDER — DAPAGLIFLOZIN 10 MG/1
10 TABLET, FILM COATED ORAL DAILY
Qty: 90 TABLET | Refills: 2 | Status: SHIPPED | OUTPATIENT
Start: 2022-12-27 | End: 2023-06-27 | Stop reason: SDUPTHER

## 2022-12-27 RX ORDER — GLIPIZIDE 2.5 MG/1
TABLET, EXTENDED RELEASE ORAL
Qty: 90 TABLET | Refills: 2 | Status: SHIPPED | OUTPATIENT
Start: 2022-12-27 | End: 2023-06-27 | Stop reason: SDUPTHER

## 2022-12-27 RX ORDER — NAPROXEN SODIUM 220 MG/1
81 TABLET, FILM COATED ORAL EVERY OTHER DAY
COMMUNITY

## 2022-12-27 RX ORDER — PRAVASTATIN SODIUM 40 MG/1
40 TABLET ORAL NIGHTLY
Qty: 90 TABLET | Refills: 2 | Status: SHIPPED | OUTPATIENT
Start: 2022-12-27 | End: 2023-06-27 | Stop reason: SDUPTHER

## 2022-12-27 RX ORDER — LANCETS
EACH MISCELLANEOUS
Qty: 100 EACH | Refills: 3 | Status: SHIPPED | OUTPATIENT
Start: 2022-12-27 | End: 2024-01-02 | Stop reason: SDUPTHER

## 2022-12-27 NOTE — PROGRESS NOTES
CC:   Chief Complaint   Patient presents with    Diabetes Mellitus     Follow up       HPI: Yosef Giordano is a 62 y.o. male presents for a follow up visit today for the management of T2DM.     He was diagnosed with Type 2 diabetes in [id number] on routine lab work and initally started on metformin. He reports severe GI upset with Metformin use. Never on insulin therapy.     Family hx of diabetes: sister- prediabetes   Hospitalized for diabetes: denies     No personal or FH of thyroid cancer or personal of pancreatic cancer or pancreatitis.       Our last visit was in March of 2022  At that visit we continued his Farxiga 10 mg daily and after his A1c resulted (after the visit) we added in glipizide 2.5 mg daily  Recent A1c of 6.4%  Reports some fasting hyperglycemia which may be secondary to prolonged fasting  Overall doing well  Has recently suffered with some dietary indiscretions due to the holidays.  Has not been going to the gym as much.  Requesting to have his testosterone levels checked at his next labs  Following with Urology for a history of elevated PSA for which she had a prostate biopsy-which came back benign        DIABETES COMPLICATIONS: nephropathy and peripheral neuropathy      Diabetes Management Status    ASA:  --81 mg every other day     Statin: Taking--pravastatin 40 mg nightly + fish oil   ACE/ARB: Taking--Lisinopril 10 mg daily      The 10-year ASCVD risk score (Jesse CONNOR, et al., 2019) is: 25.3%    Values used to calculate the score:      Age: 62 years      Sex: Male      Is Non- : No      Diabetic: Yes      Tobacco smoker: No      Systolic Blood Pressure: 138 mmHg      Is BP treated: Yes      HDL Cholesterol: 41 mg/dL      Total Cholesterol: 164 mg/dL      Screening or Prevention Patient's value Goal Complete/Controlled?   HgA1C Testing and Control   Lab Results   Component Value Date    HGBA1C 6.4 (H) 12/20/2022      Annually/Less than 8% No   Lipid profile :  03/28/2022 Annually Yes   LDL control Lab Results   Component Value Date    LDLCALC 97.8 03/28/2022    Annually/Less than 100 mg/dl  Yes   Nephropathy screening Lab Results   Component Value Date    LABMICR 51.0 03/28/2022     Lab Results   Component Value Date    PROTEINUA 1+ (A) 01/02/2019    Annually Yes   Blood pressure BP Readings from Last 1 Encounters:   12/27/22 138/79    Less than 140/90 Yes   Dilated retinal exam : 03/31/2022 - external Dr. Melendrez  Annually No   Foot exam   : 12/27/2022 Annually Yes       CURRENT A1C:    Hemoglobin A1C   Date Value Ref Range Status   12/20/2022 6.4 (H) 4.0 - 5.6 % Final     Comment:     ADA Screening Guidelines:  5.7-6.4%  Consistent with prediabetes  >or=6.5%  Consistent with diabetes    High levels of fetal hemoglobin interfere with the HbA1C  assay. Heterozygous hemoglobin variants (HbS, HgC, etc)do  not significantly interfere with this assay.   However, presence of multiple variants may affect accuracy.     03/28/2022 7.0 (H) 4.0 - 5.6 % Final     Comment:     ADA Screening Guidelines:  5.7-6.4%  Consistent with prediabetes  >or=6.5%  Consistent with diabetes    High levels of fetal hemoglobin interfere with the HbA1C  assay. Heterozygous hemoglobin variants (HbS, HgC, etc)do  not significantly interfere with this assay.   However, presence of multiple variants may affect accuracy.     12/15/2021 6.3 (H) 4.0 - 5.6 % Final     Comment:     ADA Screening Guidelines:  5.7-6.4%  Consistent with prediabetes  >or=6.5%  Consistent with diabetes    High levels of fetal hemoglobin interfere with the HbA1C  assay. Heterozygous hemoglobin variants (HbS, HgC, etc)do  not significantly interfere with this assay.   However, presence of multiple variants may affect accuracy.         GOAL A1C: 6.5% without hypoglycemia       DM MEDICATIONS USED IN THE PAST: Metformin- severe GI upset   Glipizide   Trulicity-- off due to pancreatitis   Farxiga       CURRENT DIABETES MEDICATIONS:    Farxiga 10 mg daily and Glipizide 2.5 mg daily with 1st meal of the day   Insulin: N/A   Missed doses: rarely       BLOOD GLUCOSE MONITORING: He is periodically checking his BG   130's a couple of days ago   Higher in the AM       HYPOGLYCEMIA: denies         MEALS: eating 2-3 meals per day -- usually eating 2 meals   + dietary indiscretion with the Holidays   BF: sometimes skips-- eggs and sausage or carnes   Lunch: left overs   Dinner: grilled meat with mashed potatoes or pasta - corn - sometimes salads with ranch   Snack: SF pudding or pickles  Drinks: mostly water   Rarely has soft drinks  Sweet tea occ with eating out   No juices or sodas        CURRENT EXERCISE:  Going to the Gym - M-F in the AM-- trying to go 5 days per week -- hasn;t been going as much this past month        Review of Systems  Review of Systems   Constitutional:  Negative for appetite change, fatigue and unexpected weight change.   HENT:  Negative for trouble swallowing.    Eyes:  Negative for visual disturbance.   Respiratory:  Negative for shortness of breath.    Cardiovascular:  Negative for chest pain.   Gastrointestinal:  Negative for nausea.   Endocrine: Negative for polydipsia, polyphagia and polyuria.   Genitourinary:         No Nocturia    Musculoskeletal:  Negative for arthralgias.   Skin:  Negative for wound.   Neurological:  Negative for numbness.     Physical Exam   Physical Exam  Vitals and nursing note reviewed.   Constitutional:       Appearance: He is well-developed. He is obese.   HENT:      Head: Normocephalic and atraumatic.      Right Ear: External ear normal.      Left Ear: External ear normal.      Nose: Nose normal.   Neck:      Thyroid: No thyromegaly.      Trachea: No tracheal deviation.   Cardiovascular:      Rate and Rhythm: Normal rate and regular rhythm.      Heart sounds: No murmur heard.  Pulmonary:      Effort: Pulmonary effort is normal. No respiratory distress.      Breath sounds: Normal breath sounds.    Abdominal:      Palpations: Abdomen is soft.      Tenderness: There is no abdominal tenderness.      Hernia: No hernia is present.   Musculoskeletal:      Cervical back: Normal range of motion and neck supple.   Skin:     General: Skin is warm and dry.      Capillary Refill: Capillary refill takes less than 2 seconds.      Findings: No rash.   Neurological:      Mental Status: He is alert and oriented to person, place, and time.      Cranial Nerves: No cranial nerve deficit.   Psychiatric:         Behavior: Behavior normal.         Judgment: Judgment normal.         FOOT EXAMINATION: Appropriate footwear     Protective Sensation (w/ 10 gram monofilament):  Right: Intact  Left: Intact    Visual Inspection:  Normal -  Bilateral, Nails Intact - without Evidence of Foot Deformity- Bilateral, Callus -  Bilateral, and Dry Skin -  Bilateral    Pedal Pulses:   Right: Present  Left: Present    Posterior tibialis:   Right:Present  Left: Present      Lab Results   Component Value Date    TSH 2.624 12/15/2021           Type 2 diabetes mellitus with microalbuminuria, without long-term current use of insulin  Controlled  A1c at goal    Adhesive issues with the freestyle selam -- wishes to remain off of it.       Remain off of Trulicity due to pancreatitis      -- Medication Changes:  Continue Farxiga 10 mg daily and glipizide 2.5 mg daily with 1st meal of the day  Reviewed proper glipizide use to prevent hypoglycemia      -- Reviewed goals of therapy are to get the best control we can without hypoglycemia.    -- Advised frequent self blood glucose monitoring.  Patient encouraged to document glucose results and bring them to every clinic visit. - daily at alternating times  -- Hypoglycemia precautions discussed. Instructed on precautions before driving.    -- Call for Bg repeatedly < 70 or > 180.   -- Close adherence to lifestyle changes recommended.   -- Periodic follow ups for eye evaluations, foot care and dental care  suggested.        Regarding microalbuminuria:  Optimize BG readings.   See above.   On ACE-inhibitor    Type 2 diabetes mellitus with diabetic polyneuropathy, without long-term current use of insulin  Optimize BG readings.   See above.       Educated patient to check feet daily for any foreign objects and/or wounds. Discussed with patient the importance of wearing appropriate footwear at all times, not to walk barefoot ever, and to check shoes before putting them on feet. Instructed patient to keep feet dry by regularly changing shoes and socks and drying feet after baths and exercises. Also, instructed patient to report any new lesions, discolorations, or swelling to a healthcare professional.        Severe obesity (BMI 35.0-39.9) with comorbidity  Body mass index is 39.03 kg/m².  Increases insulin resistance.   Discussed DM diet and exercise.       Hypertriglyceridemia  On Statin    Taking over-the-counter fish oil  Check lipids with RTC    Dyslipidemia, goal LDL below 100  On statin per ADA recommendations  LDL goal < 100.   Check lipids with RTC      Essential hypertension, benign  BP goal is < 140/90.   Tolerating ACEi  Controlled   Blood pressure goals discussed with patient  Encouraged patient to monitor blood pressure at home    History of pancreatitis  Avoiding GLP1 and DPP4           Follow up in about 6 months (around 6/27/2023).  Follow up with me in 6 months with labs prior please         Orders Placed This Encounter   Procedures    Hemoglobin A1C     Standing Status:   Future     Standing Expiration Date:   6/27/2024    Comprehensive Metabolic Panel     Standing Status:   Future     Standing Expiration Date:   6/27/2024    Lipid Panel     Standing Status:   Future     Standing Expiration Date:   6/27/2024    Microalbumin/Creatinine Ratio, Urine     Standing Status:   Future     Standing Expiration Date:   6/27/2024     Order Specific Question:   Specimen Source     Answer:   Urine    TSH     Standing  Status:   Future     Standing Expiration Date:   6/27/2024    CBC Auto Differential     Standing Status:   Future     Standing Expiration Date:   6/27/2024    TESTOSTERONE PANEL     Standing Status:   Future     Standing Expiration Date:   6/27/2024       Recommendations were discussed with the patient in detail  The patient verbalized understanding and agrees with the plan outlined as above.     This note was partly generated with [employer] voice recognition software. I apologize for any possible typographical errors.

## 2022-12-27 NOTE — ASSESSMENT & PLAN NOTE
Body mass index is 39.03 kg/m².  Increases insulin resistance.   Discussed DM diet and exercise.

## 2022-12-27 NOTE — ASSESSMENT & PLAN NOTE
Controlled  A1c at goal    Adhesive issues with the freestyle selam -- wishes to remain off of it.       Remain off of Trulicity due to pancreatitis      -- Medication Changes:  Continue Farxiga 10 mg daily and glipizide 2.5 mg daily with 1st meal of the day  Reviewed proper glipizide use to prevent hypoglycemia      -- Reviewed goals of therapy are to get the best control we can without hypoglycemia.    -- Advised frequent self blood glucose monitoring.  Patient encouraged to document glucose results and bring them to every clinic visit. - daily at alternating times  -- Hypoglycemia precautions discussed. Instructed on precautions before driving.    -- Call for Bg repeatedly < 70 or > 180.   -- Close adherence to lifestyle changes recommended.   -- Periodic follow ups for eye evaluations, foot care and dental care suggested.        Regarding microalbuminuria:  Optimize BG readings.   See above.   On ACE-inhibitor

## 2023-05-09 ENCOUNTER — PATIENT OUTREACH (OUTPATIENT)
Dept: ADMINISTRATIVE | Facility: HOSPITAL | Age: 63
End: 2023-05-09
Payer: COMMERCIAL

## 2023-05-09 NOTE — PROGRESS NOTES
Health Maintenance Due   Topic Date Due    COVID-19 Vaccine (1) Never done    Eye Exam  03/31/2023        Chart review done.   HM updated.   Immunizations reviewed & updated.   Care Everywhere updated.

## 2023-05-23 ENCOUNTER — OFFICE VISIT (OUTPATIENT)
Dept: PRIMARY CARE CLINIC | Facility: CLINIC | Age: 63
End: 2023-05-23
Payer: COMMERCIAL

## 2023-05-23 VITALS
TEMPERATURE: 97 F | DIASTOLIC BLOOD PRESSURE: 78 MMHG | SYSTOLIC BLOOD PRESSURE: 138 MMHG | RESPIRATION RATE: 18 BRPM | OXYGEN SATURATION: 97 % | HEIGHT: 69 IN | WEIGHT: 273.25 LBS | BODY MASS INDEX: 40.47 KG/M2 | HEART RATE: 83 BPM

## 2023-05-23 DIAGNOSIS — E78.5 TYPE 2 DIABETES MELLITUS WITH HYPERLIPIDEMIA: ICD-10-CM

## 2023-05-23 DIAGNOSIS — E11.69 TYPE 2 DIABETES MELLITUS WITH HYPERLIPIDEMIA: ICD-10-CM

## 2023-05-23 DIAGNOSIS — Z00.00 ANNUAL PHYSICAL EXAM: Primary | ICD-10-CM

## 2023-05-23 DIAGNOSIS — I10 ESSENTIAL HYPERTENSION, BENIGN: ICD-10-CM

## 2023-05-23 DIAGNOSIS — E66.01 MORBID OBESITY WITH BMI OF 40.0-44.9, ADULT: ICD-10-CM

## 2023-05-23 DIAGNOSIS — M17.11 PRIMARY OSTEOARTHRITIS OF RIGHT KNEE: ICD-10-CM

## 2023-05-23 DIAGNOSIS — E78.2 MIXED HYPERLIPIDEMIA: ICD-10-CM

## 2023-05-23 PROCEDURE — 99999 PR PBB SHADOW E&M-EST. PATIENT-LVL IV: CPT | Mod: PBBFAC,,, | Performed by: FAMILY MEDICINE

## 2023-05-23 PROCEDURE — 99999 PR PBB SHADOW E&M-EST. PATIENT-LVL IV: ICD-10-PCS | Mod: PBBFAC,,, | Performed by: FAMILY MEDICINE

## 2023-05-23 PROCEDURE — 99396 PREV VISIT EST AGE 40-64: CPT | Mod: S$GLB,,, | Performed by: FAMILY MEDICINE

## 2023-05-23 PROCEDURE — 99396 PR PREVENTIVE VISIT,EST,40-64: ICD-10-PCS | Mod: S$GLB,,, | Performed by: FAMILY MEDICINE

## 2023-05-23 NOTE — PROGRESS NOTES
"Subjective:       Patient ID: Yosef Giordano is a 63 y.o. male.    Chief Complaint: Annual Exam    Yosef Giordano is a 63 y.o. male seen today for a routine checkup. The patient has no specific complaints or concerns at this time other than occasional R knee pain, mainly medial.  No recent illness or injury.  Compliant with all prescribed medications without adverse side effects.     Review of Systems   Constitutional:  Negative for activity change and unexpected weight change.   HENT:  Negative for hearing loss, rhinorrhea and trouble swallowing.    Eyes:  Negative for discharge and visual disturbance.   Respiratory:  Negative for chest tightness and wheezing.    Cardiovascular:  Negative for chest pain and palpitations.   Gastrointestinal:  Negative for blood in stool, constipation, diarrhea and vomiting.   Endocrine: Negative for polydipsia and polyuria.   Genitourinary:  Negative for difficulty urinating, hematuria and urgency.   Musculoskeletal:  Positive for arthralgias. Negative for joint swelling and neck pain.   Skin:  Negative for rash.   Allergic/Immunologic: Negative for immunocompromised state.   Neurological:  Negative for weakness and headaches.   Hematological:  Does not bruise/bleed easily.   Psychiatric/Behavioral:  Negative for confusion and dysphoric mood.      Objective:      Vitals:    05/23/23 0931   BP: 138/78   BP Location: Right arm   Patient Position: Sitting   BP Method: Medium (Manual)   Pulse: 83   Resp: 18   Temp: 97.3 °F (36.3 °C)   TempSrc: Temporal   SpO2: 97%   Weight: 123.9 kg (273 lb 4.2 oz)   Height: 5' 9" (1.753 m)     Physical Exam  Vitals and nursing note reviewed.   Constitutional:       General: He is not in acute distress.     Appearance: Normal appearance. He is well-developed.   HENT:      Head: Normocephalic and atraumatic.   Neck:      Vascular: No carotid bruit.   Cardiovascular:      Rate and Rhythm: Normal rate and regular rhythm.      Heart sounds: Normal " heart sounds.   Pulmonary:      Effort: Pulmonary effort is normal.      Breath sounds: Normal breath sounds.   Musculoskeletal:      Right knee: Crepitus present. No swelling. Normal range of motion. No tenderness.      Right lower leg: No edema.      Left lower leg: No edema.   Skin:     General: Skin is warm and dry.   Neurological:      Mental Status: He is alert and oriented to person, place, and time.   Psychiatric:         Mood and Affect: Mood normal.         Behavior: Behavior normal.       Lab Results   Component Value Date    WBC 11.66 12/23/2021    HGB 15.9 12/23/2021    HCT 46.4 12/23/2021     12/23/2021    CHOL 164 03/28/2022    TRIG 126 03/28/2022    HDL 41 03/28/2022    ALT 20 03/28/2022    AST 20 03/28/2022     12/20/2022    K 4.1 12/20/2022     12/20/2022    CREATININE 1.0 12/20/2022    BUN 17 12/20/2022    CO2 26 12/20/2022    TSH 2.624 12/15/2021    PSA 4.4 (H) 12/15/2021    HGBA1C 6.4 (H) 12/20/2022    X-ray Knee Ortho Right with Flexion  Order: [id number]  Status: Final result     Visible to patient: Yes (seen)     Next appt: 06/20/2023 at 08:15 AM in Lab (LAB, [facility])     Dx: Right knee pain, unspecified chronicity     0 Result Notes  Details    Reading Physician Reading Date Result Priority   Lynn Cano MD  [phone] 10/21/2019 Routine     Narrative & Impression  EXAMINATION:  XR KNEE ORTHO RIGHT WITH FLEXION     CLINICAL HISTORY:  Pain in right knee     TECHNIQUE:  AP standing as well as PA flexion standing and Merchant views of both knees were performed.  A lateral view of the right knee is also performed.     COMPARISON:  Left knee 10/7/2016     FINDINGS:  There are mild degenerative changes more so right knee.  No acute fracture or dislocation.  Mild narrowing medial compartment of the right knee.     Impression:     As above        Electronically signed by: Lynn Cano MD  Date:                                            10/21/2019  Time:                                            16:22     Assessment:       1. Annual physical exam    2. Type 2 diabetes mellitus with hyperlipidemia    3. Morbid obesity with BMI of 40.0-44.9, adult    4. Mixed hyperlipidemia    5. Essential hypertension, benign    6. Primary osteoarthritis of right knee        Plan:       Annual physical exam  All age-appropriate screening up-to-date  Type 2 diabetes mellitus with hyperlipidemia  Well controlled, followed by diabetes management.  Advised to follow-up with optometry for annual retinopathy screening  Morbid obesity with BMI of 40.0-44.9, adult  Lifestyle modification  Mixed hyperlipidemia  Continue statin  Essential hypertension, benign  Well controlled  Primary osteoarthritis of right knee  Use compression sleeve, ice, p.r.n. NSAIDs    Medication List with Changes/Refills   Current Medications    ASPIRIN 81 MG CHEW    Take 81 mg by mouth every other day.    BLOOD SUGAR DIAGNOSTIC STRP    To check BG 1 times daily, to use with insurance preferred meter    BLOOD-GLUCOSE METER KIT    To check BG 1 times daily, to use with insurance preferred meter    DAPAGLIFLOZIN (FARXIGA) 10 MG TABLET    Take 1 tablet (10 mg total) by mouth once daily.    DIAZEPAM (VALIUM) 10 MG TAB    Take 10 mg by mouth 2 (two) times daily as needed.    FINASTERIDE (PROSCAR) 5 MG TABLET    Take 1 tablet (5 mg total) by mouth once daily.    GLIPIZIDE (GLUCOTROL) 2.5 MG TR24    TAKE ONE TABLET BY MOUTH EVERY MORNING with BREAKFAST    LANCETS MISC    To check BG 1 times daily, to use with insurance preferred meter    LISINOPRIL 10 MG TABLET    Take 1 tablet (10 mg total) by mouth once daily.    OMEGA 3-DHA-EPA-FISH OIL (FISH OIL) 1,000 MG (120 MG-180 MG) CAP    Take 1 capsule by mouth 2 (two) times daily.    PRAVASTATIN (PRAVACHOL) 40 MG TABLET    Take 1 tablet (40 mg total) by mouth every evening.    SERTRALINE (ZOLOFT) 100 MG TABLET    Take 2 tablets (200 mg total) by mouth once daily.

## 2023-05-24 RX ORDER — SERTRALINE HYDROCHLORIDE 100 MG/1
TABLET, FILM COATED ORAL
Qty: 180 TABLET | Refills: 3 | Status: SHIPPED | OUTPATIENT
Start: 2023-05-24 | End: 2023-05-25 | Stop reason: SDUPTHER

## 2023-05-24 NOTE — TELEPHONE ENCOUNTER
Refill Decision Note   Yosef Giordano  is requesting a refill authorization.  Brief Assessment and Rationale for Refill:  Approve     Medication Therapy Plan:         Comments:     Note composed:1:28 PM 05/24/2023             Appointments     Last Visit   5/23/2023 Sami Hernadez MD   Next Visit   Visit date not found Sami Hernadez MD      
No care due was identified.  [facility] Embedded Care Due Messages. Reference number: [id number].   5/24/2023 12:36:31 PM CDT  
none

## 2023-05-25 RX ORDER — SERTRALINE HYDROCHLORIDE 100 MG/1
200 TABLET, FILM COATED ORAL DAILY
Qty: 180 TABLET | Refills: 3 | Status: SHIPPED | OUTPATIENT
Start: 2023-05-25

## 2023-05-25 NOTE — TELEPHONE ENCOUNTER
No care due was identified.  [facility] Embedded Care Due Messages. Reference number: [id number].   5/25/2023 3:16:01 PM CDT

## 2023-05-29 RX ORDER — FINASTERIDE 5 MG/1
TABLET, FILM COATED ORAL
Qty: 30 TABLET | Refills: 11 | Status: SHIPPED | OUTPATIENT
Start: 2023-05-29 | End: 2024-01-19 | Stop reason: SDUPTHER

## 2023-06-26 ENCOUNTER — TELEPHONE (OUTPATIENT)
Dept: DIABETES | Facility: CLINIC | Age: 63
End: 2023-06-26
Payer: COMMERCIAL

## 2023-06-26 NOTE — PROGRESS NOTES
CC:   Chief Complaint   Patient presents with    Diabetes Mellitus       HPI: Yosef Giordano is a 63 y.o. male presents for a follow up visit today for the management of T2DM.     He was diagnosed with Type 2 diabetes in 2655-0450 on routine lab work and initally started on metformin. He reports severe GI upset with Metformin use. Never on insulin therapy.     Family hx of diabetes: sister- prediabetes   Hospitalized for diabetes: denies     No personal or FH of thyroid cancer or personal of pancreatic cancer or pancreatitis.       Our last Visit was in December of 2022  At that visit we continued the Farxiga and glipizide  Recent A1c of 6.6%  Doing weight watchers   Weight from 264# to 256#   No  complaints          DIABETES COMPLICATIONS: nephropathy and peripheral neuropathy      Diabetes Management Status    ASA:  --81 mg every other day     Statin: Taking--pravastatin 40 mg nightly + fish oil   ACE/ARB: Taking--Lisinopril 10 mg daily      The 10-year ASCVD risk score (Jesse CONNOR, et al., 2019) is: 18.8%    Values used to calculate the score:      Age: 63 years      Sex: Male      Is Non- : No      Diabetic: Yes      Tobacco smoker: No      Systolic Blood Pressure: 122 mmHg      Is BP treated: Yes      HDL Cholesterol: 44 mg/dL      Total Cholesterol: 134 mg/dL      Screening or Prevention Patient's value Goal Complete/Controlled?   HgA1C Testing and Control   Lab Results   Component Value Date    HGBA1C 6.6 (H) 06/20/2023      Annually/Less than 8% No   Lipid profile : 06/20/2023 Annually Yes   LDL control Lab Results   Component Value Date    LDLCALC 58.0 (L) 06/20/2023    Annually/Less than 100 mg/dl  Yes   Nephropathy screening Lab Results   Component Value Date    LABMICR 47.0 06/20/2023     Lab Results   Component Value Date    PROTEINUA 1+ (A) 01/02/2019    Annually Yes   Blood pressure BP Readings from Last 1 Encounters:   06/27/23 122/68    Less than 140/90 Yes   Dilated  retinal exam : 03/31/2022 - external Dr. Melendrez  Annually No   Foot exam   : 12/27/2022 Annually Yes       CURRENT A1C:    Hemoglobin A1C   Date Value Ref Range Status   06/20/2023 6.6 (H) 4.0 - 5.6 % Final     Comment:     ADA Screening Guidelines:  5.7-6.4%  Consistent with prediabetes  >or=6.5%  Consistent with diabetes    High levels of fetal hemoglobin interfere with the HbA1C  assay. Heterozygous hemoglobin variants (HbS, HgC, etc)do  not significantly interfere with this assay.   However, presence of multiple variants may affect accuracy.     12/20/2022 6.4 (H) 4.0 - 5.6 % Final     Comment:     ADA Screening Guidelines:  5.7-6.4%  Consistent with prediabetes  >or=6.5%  Consistent with diabetes    High levels of fetal hemoglobin interfere with the HbA1C  assay. Heterozygous hemoglobin variants (HbS, HgC, etc)do  not significantly interfere with this assay.   However, presence of multiple variants may affect accuracy.     03/28/2022 7.0 (H) 4.0 - 5.6 % Final     Comment:     ADA Screening Guidelines:  5.7-6.4%  Consistent with prediabetes  >or=6.5%  Consistent with diabetes    High levels of fetal hemoglobin interfere with the HbA1C  assay. Heterozygous hemoglobin variants (HbS, HgC, etc)do  not significantly interfere with this assay.   However, presence of multiple variants may affect accuracy.         GOAL A1C: 6.5% without hypoglycemia       DM MEDICATIONS USED IN THE PAST: Metformin- severe GI upset   Glipizide   Trulicity-- off due to pancreatitis   Farxiga       CURRENT DIABETES MEDICATIONS:   Farxiga 10 mg daily and Glipizide 2.5 mg daily with 1st meal of the day   Insulin: N/A   Missed doses: rarely       BLOOD GLUCOSE MONITORING: He is periodically checking his BG   Per oral recall - 140- 160's   Once over 200       HYPOGLYCEMIA: denies         MEALS: eating 2-3 meals per day -- usually eating 2 meals   Weight watchers diet    BF: eggs   Lunch: left overs   Dinner: grilled meat and veggies   Snack:  SF pudding or pickles- cheese its -- most salty than sweet   Drinks: mostly water   No juices or sodas        CURRENT EXERCISE: walking in the neighborhood        Review of Systems  Review of Systems   Constitutional:  Negative for appetite change, fatigue and unexpected weight change.   HENT:  Negative for trouble swallowing.    Eyes:  Negative for visual disturbance.   Respiratory:  Negative for shortness of breath.    Cardiovascular:  Negative for chest pain.   Gastrointestinal:  Negative for nausea.   Endocrine: Negative for polydipsia, polyphagia and polyuria.   Genitourinary:         No Nocturia    Musculoskeletal:  Negative for arthralgias.   Skin:  Negative for wound.   Neurological:  Negative for numbness.     Physical Exam   Physical Exam  Vitals and nursing note reviewed.   Constitutional:       Appearance: He is well-developed. He is obese.   HENT:      Head: Normocephalic and atraumatic.      Right Ear: External ear normal.      Left Ear: External ear normal.      Nose: Nose normal.   Neck:      Thyroid: No thyromegaly.      Trachea: No tracheal deviation.   Cardiovascular:      Rate and Rhythm: Normal rate and regular rhythm.      Heart sounds: No murmur heard.  Pulmonary:      Effort: Pulmonary effort is normal. No respiratory distress.      Breath sounds: Normal breath sounds.   Abdominal:      Palpations: Abdomen is soft.      Tenderness: There is no abdominal tenderness.      Hernia: No hernia is present.   Musculoskeletal:      Cervical back: Normal range of motion and neck supple.   Skin:     General: Skin is warm and dry.      Capillary Refill: Capillary refill takes less than 2 seconds.      Findings: No rash.   Neurological:      Mental Status: He is alert and oriented to person, place, and time.      Cranial Nerves: No cranial nerve deficit.   Psychiatric:         Behavior: Behavior normal.         Judgment: Judgment normal.         FOOT EXAMINATION: Appropriate footwear         Lab Results    Component Value Date    TSH 3.148 06/20/2023           Type 2 diabetes mellitus with microalbuminuria, without long-term current use of insulin  Controlled      Adhesive issues with the freestyle selam -- wishes to remain off of it.       Remain off of Trulicity due to pancreatitis      -- Medication Changes:  Continue Farxiga 10 mg daily and glipizide 2.5 mg daily with 1st meal of the day  Reviewed proper glipizide use to prevent hypoglycemia      -- Reviewed goals of therapy are to get the best control we can without hypoglycemia.    -- Advised frequent self blood glucose monitoring.  Patient encouraged to document glucose results and bring them to every clinic visit. - daily at alternating times  -- Hypoglycemia precautions discussed. Instructed on precautions before driving.    -- Call for Bg repeatedly < 70 or > 180.   -- Close adherence to lifestyle changes recommended.   -- Periodic follow ups for eye evaluations, foot care and dental care suggested.        Regarding microalbuminuria:  Optimize BG readings.   See above.   On ACE-inhibitor and SGLT2    Type 2 diabetes mellitus with diabetic polyneuropathy, without long-term current use of insulin  Optimize BG readings.   See above.       Educated patient to check feet daily for any foreign objects and/or wounds. Discussed with patient the importance of wearing appropriate footwear at all times, not to walk barefoot ever, and to check shoes before putting them on feet. Instructed patient to keep feet dry by regularly changing shoes and socks and drying feet after baths and exercises. Also, instructed patient to report any new lesions, discolorations, or swelling to a healthcare professional.        Class 2 severe obesity due to excess calories with serious comorbidity and body mass index (BMI) of 37.0 to 37.9 in adult  Body mass index is 37.94 kg/m².  Increases insulin resistance.   Discussed DM diet and exercise.       Mixed hyperlipidemia  On Statin     Taking over-the-counter fish oil  Reviewed diet     Essential hypertension, benign  BP goal is < 140/90.   Tolerating ACEi  Controlled   Blood pressure goals discussed with patient  Encouraged patient to monitor blood pressure at home    History of pancreatitis  Avoiding GLP1 and DPP4             Follow up in about 6 months (around 12/27/2023).  Follow up with me in 6 months -- after that he will see me 1 x/ year -- and see yifan in 6 months   Labs prior         Orders Placed This Encounter   Procedures    Basic Metabolic Panel     Standing Status:   Future     Standing Expiration Date:   12/27/2024    Hemoglobin A1C     Standing Status:   Future     Standing Expiration Date:   12/27/2024    Microalbumin/Creatinine Ratio, Urine     Standing Status:   Future     Standing Expiration Date:   12/27/2024     Order Specific Question:   Specimen Source     Answer:   Urine       Recommendations were discussed with the patient in detail  The patient verbalized understanding and agrees with the plan outlined as above.     This note was partly generated with [employer] voice recognition software. I apologize for any possible typographical errors.

## 2023-06-27 ENCOUNTER — OFFICE VISIT (OUTPATIENT)
Dept: DIABETES | Facility: CLINIC | Age: 63
End: 2023-06-27
Payer: COMMERCIAL

## 2023-06-27 ENCOUNTER — TELEPHONE (OUTPATIENT)
Dept: DIABETES | Facility: CLINIC | Age: 63
End: 2023-06-27
Payer: COMMERCIAL

## 2023-06-27 ENCOUNTER — TELEPHONE (OUTPATIENT)
Dept: DIABETES | Facility: CLINIC | Age: 63
End: 2023-06-27

## 2023-06-27 VITALS
HEIGHT: 69 IN | BODY MASS INDEX: 38.05 KG/M2 | SYSTOLIC BLOOD PRESSURE: 122 MMHG | DIASTOLIC BLOOD PRESSURE: 68 MMHG | HEART RATE: 88 BPM | WEIGHT: 256.88 LBS | OXYGEN SATURATION: 98 %

## 2023-06-27 DIAGNOSIS — Z87.19 HISTORY OF PANCREATITIS: ICD-10-CM

## 2023-06-27 DIAGNOSIS — E78.1 HYPERTRIGLYCERIDEMIA: ICD-10-CM

## 2023-06-27 DIAGNOSIS — E78.2 MIXED HYPERLIPIDEMIA: ICD-10-CM

## 2023-06-27 DIAGNOSIS — R80.9 TYPE 2 DIABETES MELLITUS WITH MICROALBUMINURIA, WITHOUT LONG-TERM CURRENT USE OF INSULIN: Primary | ICD-10-CM

## 2023-06-27 DIAGNOSIS — E11.42 TYPE 2 DIABETES MELLITUS WITH DIABETIC POLYNEUROPATHY, WITHOUT LONG-TERM CURRENT USE OF INSULIN: ICD-10-CM

## 2023-06-27 DIAGNOSIS — I10 ESSENTIAL HYPERTENSION, BENIGN: ICD-10-CM

## 2023-06-27 DIAGNOSIS — E11.69 TYPE 2 DIABETES MELLITUS WITH HYPERLIPIDEMIA: ICD-10-CM

## 2023-06-27 DIAGNOSIS — E11.29 TYPE 2 DIABETES MELLITUS WITH MICROALBUMINURIA, WITHOUT LONG-TERM CURRENT USE OF INSULIN: Primary | ICD-10-CM

## 2023-06-27 DIAGNOSIS — E66.01 CLASS 2 SEVERE OBESITY DUE TO EXCESS CALORIES WITH SERIOUS COMORBIDITY AND BODY MASS INDEX (BMI) OF 37.0 TO 37.9 IN ADULT: ICD-10-CM

## 2023-06-27 DIAGNOSIS — E78.5 TYPE 2 DIABETES MELLITUS WITH HYPERLIPIDEMIA: ICD-10-CM

## 2023-06-27 PROCEDURE — 99999 PR PBB SHADOW E&M-EST. PATIENT-LVL IV: CPT | Mod: PBBFAC,,, | Performed by: NURSE PRACTITIONER

## 2023-06-27 PROCEDURE — 99214 OFFICE O/P EST MOD 30 MIN: CPT | Mod: S$GLB,,, | Performed by: NURSE PRACTITIONER

## 2023-06-27 PROCEDURE — 99214 PR OFFICE/OUTPT VISIT, EST, LEVL IV, 30-39 MIN: ICD-10-PCS | Mod: S$GLB,,, | Performed by: NURSE PRACTITIONER

## 2023-06-27 PROCEDURE — 99999 PR PBB SHADOW E&M-EST. PATIENT-LVL IV: ICD-10-PCS | Mod: PBBFAC,,, | Performed by: NURSE PRACTITIONER

## 2023-06-27 RX ORDER — GLIPIZIDE 2.5 MG/1
TABLET, EXTENDED RELEASE ORAL
Qty: 90 TABLET | Refills: 2 | Status: SHIPPED | OUTPATIENT
Start: 2023-06-27 | End: 2024-01-02 | Stop reason: SDUPTHER

## 2023-06-27 RX ORDER — LISINOPRIL 10 MG/1
10 TABLET ORAL DAILY
Qty: 90 TABLET | Refills: 2 | Status: SHIPPED | OUTPATIENT
Start: 2023-06-27 | End: 2024-01-02 | Stop reason: ALTCHOICE

## 2023-06-27 RX ORDER — DAPAGLIFLOZIN 10 MG/1
10 TABLET, FILM COATED ORAL DAILY
Qty: 90 TABLET | Refills: 2 | Status: SHIPPED | OUTPATIENT
Start: 2023-06-27 | End: 2024-01-02 | Stop reason: SDUPTHER

## 2023-06-27 RX ORDER — GLUCOSAM/CHONDRO/HERB 149/HYAL 750-100 MG
1 TABLET ORAL 2 TIMES DAILY
Qty: 180 CAPSULE | Refills: 3 | COMMUNITY
Start: 2023-06-27 | End: 2024-01-02 | Stop reason: SDUPTHER

## 2023-06-27 RX ORDER — PRAVASTATIN SODIUM 40 MG/1
40 TABLET ORAL NIGHTLY
Qty: 90 TABLET | Refills: 2 | Status: SHIPPED | OUTPATIENT
Start: 2023-06-27 | End: 2024-01-02 | Stop reason: SDUPTHER

## 2023-06-27 NOTE — ASSESSMENT & PLAN NOTE
Controlled      Adhesive issues with the freestyle selam -- wishes to remain off of it.       Remain off of Trulicity due to pancreatitis      -- Medication Changes:  Continue Farxiga 10 mg daily and glipizide 2.5 mg daily with 1st meal of the day  Reviewed proper glipizide use to prevent hypoglycemia      -- Reviewed goals of therapy are to get the best control we can without hypoglycemia.    -- Advised frequent self blood glucose monitoring.  Patient encouraged to document glucose results and bring them to every clinic visit. - daily at alternating times  -- Hypoglycemia precautions discussed. Instructed on precautions before driving.    -- Call for Bg repeatedly < 70 or > 180.   -- Close adherence to lifestyle changes recommended.   -- Periodic follow ups for eye evaluations, foot care and dental care suggested.        Regarding microalbuminuria:  Optimize BG readings.   See above.   On ACE-inhibitor and SGLT2

## 2023-07-12 ENCOUNTER — PATIENT OUTREACH (OUTPATIENT)
Dept: ADMINISTRATIVE | Facility: HOSPITAL | Age: 63
End: 2023-07-12
Payer: COMMERCIAL

## 2023-07-12 NOTE — PROGRESS NOTES
There are no preventive care reminders to display for this patient.    Chart Review Done     Immunizations - reviewed and updated   Care Everywhere - triggered   Care Teams - updated   Outreach - None needed. DM eye exam received, already uploaded to chart. Eye exam not due until 6/12/2024. Patient is due to labs in December. Scheduled for 12/21/2023.

## 2023-09-18 ENCOUNTER — PATIENT MESSAGE (OUTPATIENT)
Dept: PRIMARY CARE CLINIC | Facility: CLINIC | Age: 63
End: 2023-09-18
Payer: COMMERCIAL

## 2023-10-18 ENCOUNTER — PATIENT MESSAGE (OUTPATIENT)
Dept: CARDIOLOGY | Facility: CLINIC | Age: 63
End: 2023-10-18
Payer: COMMERCIAL

## 2023-11-12 ENCOUNTER — PATIENT MESSAGE (OUTPATIENT)
Dept: DIABETES | Facility: CLINIC | Age: 63
End: 2023-11-12
Payer: COMMERCIAL

## 2023-12-25 ENCOUNTER — PATIENT MESSAGE (OUTPATIENT)
Dept: ADMINISTRATIVE | Facility: OTHER | Age: 63
End: 2023-12-25
Payer: COMMERCIAL

## 2024-01-02 ENCOUNTER — OFFICE VISIT (OUTPATIENT)
Dept: DIABETES | Facility: CLINIC | Age: 64
End: 2024-01-02
Payer: COMMERCIAL

## 2024-01-02 VITALS
HEART RATE: 78 BPM | HEIGHT: 69 IN | OXYGEN SATURATION: 96 % | WEIGHT: 266.13 LBS | DIASTOLIC BLOOD PRESSURE: 84 MMHG | BODY MASS INDEX: 39.42 KG/M2 | SYSTOLIC BLOOD PRESSURE: 133 MMHG

## 2024-01-02 DIAGNOSIS — R80.9 TYPE 2 DIABETES MELLITUS WITH MICROALBUMINURIA, WITHOUT LONG-TERM CURRENT USE OF INSULIN: Primary | ICD-10-CM

## 2024-01-02 DIAGNOSIS — Z71.9 HEALTH EDUCATION/COUNSELING: ICD-10-CM

## 2024-01-02 DIAGNOSIS — E11.69 TYPE 2 DIABETES MELLITUS WITH HYPERLIPIDEMIA: ICD-10-CM

## 2024-01-02 DIAGNOSIS — E78.2 MIXED HYPERLIPIDEMIA: ICD-10-CM

## 2024-01-02 DIAGNOSIS — E78.1 HYPERTRIGLYCERIDEMIA: ICD-10-CM

## 2024-01-02 DIAGNOSIS — E66.01 CLASS 2 SEVERE OBESITY DUE TO EXCESS CALORIES WITH SERIOUS COMORBIDITY AND BODY MASS INDEX (BMI) OF 39.0 TO 39.9 IN ADULT: ICD-10-CM

## 2024-01-02 DIAGNOSIS — E11.42 TYPE 2 DIABETES MELLITUS WITH DIABETIC POLYNEUROPATHY, WITHOUT LONG-TERM CURRENT USE OF INSULIN: ICD-10-CM

## 2024-01-02 DIAGNOSIS — Z87.19 HISTORY OF PANCREATITIS: ICD-10-CM

## 2024-01-02 DIAGNOSIS — E78.5 TYPE 2 DIABETES MELLITUS WITH HYPERLIPIDEMIA: ICD-10-CM

## 2024-01-02 DIAGNOSIS — I10 ESSENTIAL HYPERTENSION, BENIGN: ICD-10-CM

## 2024-01-02 DIAGNOSIS — E11.29 TYPE 2 DIABETES MELLITUS WITH MICROALBUMINURIA, WITHOUT LONG-TERM CURRENT USE OF INSULIN: Primary | ICD-10-CM

## 2024-01-02 PROCEDURE — 99999 PR PBB SHADOW E&M-EST. PATIENT-LVL IV: CPT | Mod: PBBFAC,,, | Performed by: NURSE PRACTITIONER

## 2024-01-02 PROCEDURE — 99214 OFFICE O/P EST MOD 30 MIN: CPT | Mod: S$GLB,,, | Performed by: NURSE PRACTITIONER

## 2024-01-02 RX ORDER — GLIPIZIDE 2.5 MG/1
TABLET, EXTENDED RELEASE ORAL
Qty: 90 TABLET | Refills: 2 | Status: SHIPPED | OUTPATIENT
Start: 2024-01-02

## 2024-01-02 RX ORDER — LOSARTAN POTASSIUM 50 MG/1
50 TABLET ORAL DAILY
Qty: 90 TABLET | Refills: 3 | Status: SHIPPED | OUTPATIENT
Start: 2024-01-02 | End: 2025-01-01

## 2024-01-02 RX ORDER — GLUCOSAM/CHONDRO/HERB 149/HYAL 750-100 MG
1 TABLET ORAL 2 TIMES DAILY
Qty: 180 CAPSULE | Refills: 3 | COMMUNITY
Start: 2024-01-02 | End: 2025-01-01

## 2024-01-02 RX ORDER — PRAVASTATIN SODIUM 40 MG/1
40 TABLET ORAL NIGHTLY
Qty: 90 TABLET | Refills: 2 | Status: SHIPPED | OUTPATIENT
Start: 2024-01-02

## 2024-01-02 RX ORDER — DAPAGLIFLOZIN 10 MG/1
10 TABLET, FILM COATED ORAL DAILY
Qty: 90 TABLET | Refills: 2 | Status: SHIPPED | OUTPATIENT
Start: 2024-01-02

## 2024-01-02 RX ORDER — LANCETS
EACH MISCELLANEOUS
Qty: 100 EACH | Refills: 3 | Status: SHIPPED | OUTPATIENT
Start: 2024-01-02

## 2024-01-02 NOTE — ASSESSMENT & PLAN NOTE
BP goal is < 140/90.   Tolerating ACEi-- change lisinopril to losartan to help with microalbuminuria  Controlled   Blood pressure goals discussed with patient  Encouraged patient to monitor blood pressure at home  Nurse visit for blood pressure check in 2-3 weeks

## 2024-01-02 NOTE — ASSESSMENT & PLAN NOTE
Body mass index is 39.3 kg/m².  Increases insulin resistance.   Discussed DM diet and exercise.

## 2024-01-02 NOTE — ASSESSMENT & PLAN NOTE
On statin per ADA recommendations  LDL goal < 100. LDL at goal. LFTs WNL. Continue statin and fish oil

## 2024-01-02 NOTE — PROGRESS NOTES
CC:   Chief Complaint   Patient presents with    Diabetes Mellitus       HPI: Yosef Giordano Jr. is a 63 y.o. male presents for a follow up visit today for the management of T2DM.     He was diagnosed with Type 2 diabetes in 4677-2728 on routine lab work and initally started on metformin. He reports severe GI upset with Metformin use. Never on insulin therapy.     Family hx of diabetes: sister- prediabetes   Hospitalized for diabetes: denies     No personal or FH of thyroid cancer or personal of pancreatic cancer   + pancreatitis while on Trulicity       Our last visit was in June 2023   At that visit we continued the Farxiga and continued Glipizide   A1c went from 6.6% to 6.4%   Doing well overall- no complaints   Still having microalbuminuria on lisinopril -- reports compliance   Not monitoring BP at home         DIABETES COMPLICATIONS: nephropathy and peripheral neuropathy      Diabetes Management Status    ASA:  --81 mg every other day     Statin: Taking--pravastatin 40 mg nightly + fish oil   ACE/ARB: Taking--Lisinopril 10 mg daily      The 10-year ASCVD risk score (Jesse CONNOR, et al., 2019) is: 21.5%    Values used to calculate the score:      Age: 63 years      Sex: Male      Is Non- : No      Diabetic: Yes      Tobacco smoker: No      Systolic Blood Pressure: 133 mmHg      Is BP treated: Yes      HDL Cholesterol: 44 mg/dL      Total Cholesterol: 134 mg/dL      Screening or Prevention Patient's value Goal Complete/Controlled?   HgA1C Testing and Control   Lab Results   Component Value Date    HGBA1C 6.4 (H) 12/21/2023      Annually/Less than 8% No   Lipid profile : 06/20/2023 Annually Yes   LDL control Lab Results   Component Value Date    LDLCALC 58.0 (L) 06/20/2023    Annually/Less than 100 mg/dl  Yes   Nephropathy screening Lab Results   Component Value Date    LABMICR 59.0 12/21/2023     Lab Results   Component Value Date    PROTEINUA 1+ (A) 01/02/2019    Annually Yes   Blood  pressure BP Readings from Last 1 Encounters:   01/02/24 133/84    Less than 140/90 Yes   Dilated retinal exam : 06/12/2023 - external Dr. Melendrez  Annually No   Foot exam   : 01/02/2024 Annually Yes       CURRENT A1C:    Hemoglobin A1C   Date Value Ref Range Status   12/21/2023 6.4 (H) 4.0 - 5.6 % Final     Comment:     ADA Screening Guidelines:  5.7-6.4%  Consistent with prediabetes  >or=6.5%  Consistent with diabetes    High levels of fetal hemoglobin interfere with the HbA1C  assay. Heterozygous hemoglobin variants (HbS, HgC, etc)do  not significantly interfere with this assay.   However, presence of multiple variants may affect accuracy.     06/20/2023 6.6 (H) 4.0 - 5.6 % Final     Comment:     ADA Screening Guidelines:  5.7-6.4%  Consistent with prediabetes  >or=6.5%  Consistent with diabetes    High levels of fetal hemoglobin interfere with the HbA1C  assay. Heterozygous hemoglobin variants (HbS, HgC, etc)do  not significantly interfere with this assay.   However, presence of multiple variants may affect accuracy.     12/20/2022 6.4 (H) 4.0 - 5.6 % Final     Comment:     ADA Screening Guidelines:  5.7-6.4%  Consistent with prediabetes  >or=6.5%  Consistent with diabetes    High levels of fetal hemoglobin interfere with the HbA1C  assay. Heterozygous hemoglobin variants (HbS, HgC, etc)do  not significantly interfere with this assay.   However, presence of multiple variants may affect accuracy.         GOAL A1C: 6.5% without hypoglycemia       DM MEDICATIONS USED IN THE PAST: Metformin- severe GI upset   Glipizide   Trulicity-- off due to pancreatitis   Farxiga       CURRENT DIABETES MEDICATIONS:   Farxiga 10 mg daily and Glipizide 2.5 mg daily with 1st meal of the day   Insulin: N/A   Missed doses: rarely       BLOOD GLUCOSE MONITORING: He is periodically checking his BG   Per oral recall -  140's       HYPOGLYCEMIA: denies         MEALS: eating 2-3 meals per day -- usually eating 2 meals   Off weight watchers    BF: eggs   Lunch: left overs   Dinner: grilled meat and veggies   Snack: SF pudding or pickles- cheese its -- most salty than sweet   Drinks: mostly water   No juices or sodas        CURRENT EXERCISE:  back at the gym now   At least 3 days per week         Review of Systems  Review of Systems   Constitutional:  Negative for appetite change, fatigue and unexpected weight change.   HENT:  Negative for trouble swallowing.    Eyes:  Negative for visual disturbance.   Respiratory:  Negative for shortness of breath.    Cardiovascular:  Negative for chest pain.   Gastrointestinal:  Negative for nausea.   Endocrine: Negative for polydipsia, polyphagia and polyuria.   Genitourinary:         No Nocturia    Musculoskeletal:  Negative for arthralgias.   Skin:  Negative for wound.   Neurological:  Negative for numbness.       Physical Exam   Physical Exam  Vitals and nursing note reviewed.   Constitutional:       Appearance: He is well-developed. He is obese.   HENT:      Head: Normocephalic and atraumatic.      Right Ear: External ear normal.      Left Ear: External ear normal.      Nose: Nose normal.   Neck:      Thyroid: No thyromegaly.      Trachea: No tracheal deviation.   Cardiovascular:      Rate and Rhythm: Normal rate and regular rhythm.      Heart sounds: No murmur heard.  Pulmonary:      Effort: Pulmonary effort is normal. No respiratory distress.      Breath sounds: Normal breath sounds.   Abdominal:      Palpations: Abdomen is soft.      Tenderness: There is no abdominal tenderness.      Hernia: No hernia is present.   Musculoskeletal:      Cervical back: Normal range of motion and neck supple.   Skin:     General: Skin is warm and dry.      Capillary Refill: Capillary refill takes less than 2 seconds.      Findings: No rash.   Neurological:      Mental Status: He is alert and oriented to person, place, and time.      Cranial Nerves: No cranial nerve deficit.   Psychiatric:         Behavior: Behavior normal.          Judgment: Judgment normal.           FOOT EXAMINATION: Appropriate footwear     Protective Sensation (w/ 10 gram monofilament):  Right: Intact  Left: Intact    Visual Inspection:  Normal -  Bilateral, Nails Intact - without Evidence of Foot Deformity- Bilateral, Callus -  Bilateral, and Dry Skin -  Bilateral    Pedal Pulses:   Right: Present  Left: Present    Posterior Tibialis Pulses:   Right:Present  Left: Present      Lab Results   Component Value Date    TSH 3.148 06/20/2023           Type 2 diabetes mellitus with microalbuminuria, without long-term current use of insulin  Controlled    Adhesive issues with the freestyle selam -- wishes to remain off of it.       Remain off of Trulicity due to pancreatitis      -- Medication Changes:  Continue Farxiga 10 mg daily and glipizide 2.5 mg daily with 1st meal of the day  Reviewed proper glipizide use to prevent hypoglycemia      -- Reviewed goals of therapy are to get the best control we can without hypoglycemia.    -- Advised frequent self blood glucose monitoring.  Patient encouraged to document glucose results and bring them to every clinic visit. - daily at alternating times  -- Hypoglycemia precautions discussed. Instructed on precautions before driving.    -- Call for Bg repeatedly < 70 or > 180.   -- Close adherence to lifestyle changes recommended.   -- Periodic follow ups for eye evaluations, foot care and dental care suggested.        Regarding microalbuminuria:  Optimize BG readings.   See above.   On  SGLT2  Change Lisinopril to Losartan- 50 mg daily   Check BP in 2-3 weeks   Can consider maximizing to 100 mg daily   Urine MC with RTC     Type 2 diabetes mellitus with diabetic polyneuropathy, without long-term current use of insulin  Optimize BG readings.   See above.       Educated patient to check feet daily for any foreign objects and/or wounds. Discussed with patient the importance of wearing appropriate footwear at all times, not to walk barefoot  ever, and to check shoes before putting them on feet. Instructed patient to keep feet dry by regularly changing shoes and socks and drying feet after baths and exercises. Also, instructed patient to report any new lesions, discolorations, or swelling to a healthcare professional.        Class 2 severe obesity due to excess calories with serious comorbidity and body mass index (BMI) of 39.0 to 39.9 in adult  Body mass index is 39.3 kg/m².  Increases insulin resistance.   Discussed DM diet and exercise.       Mixed hyperlipidemia  On statin per ADA recommendations  LDL goal < 100. LDL at goal. LFTs WNL. Continue statin and fish oil     Essential hypertension, benign  BP goal is < 140/90.   Tolerating ACEi-- change lisinopril to losartan to help with microalbuminuria  Controlled   Blood pressure goals discussed with patient  Encouraged patient to monitor blood pressure at home  Nurse visit for blood pressure check in 2-3 weeks    History of pancreatitis  Avoiding GLP1 and DPP4       I spent a total of 30 minutes on the day of the visit.This includes face to face time and non-face to face time preparing to see the patient (eg, review of tests), obtaining and/or reviewing separately obtained history, documenting clinical information in the electronic or other health record, independently interpreting results and communicating results to the patient/family/caregiver, or care coordinator.        Follow up in about 6 months (around 7/2/2024).  Nurse visit in 2-3 weeks for BP check   Schedule follow up with urology- Dr. Robb   Follow up with me in 6 months with labs prior         Orders Placed This Encounter   Procedures    Hemoglobin A1C     Standing Status:   Future     Standing Expiration Date:   7/2/2025    Comprehensive Metabolic Panel     Standing Status:   Future     Standing Expiration Date:   7/2/2025    CBC Auto Differential     Standing Status:   Future     Standing Expiration Date:   7/2/2025    Lipid Panel      Standing Status:   Future     Standing Expiration Date:   7/2/2025    Microalbumin/Creatinine Ratio, Urine     Standing Status:   Future     Standing Expiration Date:   7/2/2025     Order Specific Question:   Specimen Source     Answer:   Urine    TSH     Standing Status:   Future     Standing Expiration Date:   7/2/2025       Recommendations were discussed with the patient in detail  The patient verbalized understanding and agrees with the plan outlined as above.     This note was partly generated with [employer] voice recognition software. I apologize for any possible typographical errors.

## 2024-01-02 NOTE — ASSESSMENT & PLAN NOTE
Controlled    Adhesive issues with the freestyle selam -- wishes to remain off of it.       Remain off of Trulicity due to pancreatitis      -- Medication Changes:  Continue Farxiga 10 mg daily and glipizide 2.5 mg daily with 1st meal of the day  Reviewed proper glipizide use to prevent hypoglycemia      -- Reviewed goals of therapy are to get the best control we can without hypoglycemia.    -- Advised frequent self blood glucose monitoring.  Patient encouraged to document glucose results and bring them to every clinic visit. - daily at alternating times  -- Hypoglycemia precautions discussed. Instructed on precautions before driving.    -- Call for Bg repeatedly < 70 or > 180.   -- Close adherence to lifestyle changes recommended.   -- Periodic follow ups for eye evaluations, foot care and dental care suggested.        Regarding microalbuminuria:  Optimize BG readings.   See above.   On  SGLT2  Change Lisinopril to Losartan- 50 mg daily   Check BP in 2-3 weeks   Can consider maximizing to 100 mg daily   Urine MC with RTC

## 2024-01-19 ENCOUNTER — OFFICE VISIT (OUTPATIENT)
Dept: UROLOGY | Facility: CLINIC | Age: 64
End: 2024-01-19
Payer: COMMERCIAL

## 2024-01-19 VITALS
WEIGHT: 259.69 LBS | HEART RATE: 92 BPM | SYSTOLIC BLOOD PRESSURE: 126 MMHG | BODY MASS INDEX: 38.35 KG/M2 | DIASTOLIC BLOOD PRESSURE: 77 MMHG

## 2024-01-19 DIAGNOSIS — R97.20 ELEVATED PSA: Primary | ICD-10-CM

## 2024-01-19 LAB
BILIRUB SERPL-MCNC: NORMAL MG/DL
BLOOD URINE, POC: NORMAL
CLARITY, POC UA: CLEAR
COLOR, POC UA: YELLOW
GLUCOSE UR QL STRIP: 2000
KETONES UR QL STRIP: NORMAL
LEUKOCYTE ESTERASE URINE, POC: NORMAL
NITRITE, POC UA: NORMAL
PH, POC UA: 6
PROTEIN, POC: NORMAL
SPECIFIC GRAVITY, POC UA: 1.02
UROBILINOGEN, POC UA: NORMAL

## 2024-01-19 PROCEDURE — 99213 OFFICE O/P EST LOW 20 MIN: CPT | Mod: 25,S$GLB,, | Performed by: UROLOGY

## 2024-01-19 PROCEDURE — 81002 URINALYSIS NONAUTO W/O SCOPE: CPT | Mod: S$GLB,,, | Performed by: UROLOGY

## 2024-01-19 PROCEDURE — 99999 PR PBB SHADOW E&M-EST. PATIENT-LVL III: CPT | Mod: PBBFAC,,, | Performed by: UROLOGY

## 2024-01-19 RX ORDER — FINASTERIDE 5 MG/1
5 TABLET, FILM COATED ORAL DAILY
Qty: 90 TABLET | Refills: 3 | Status: SHIPPED | OUTPATIENT
Start: 2024-01-19 | End: 2025-01-18

## 2024-01-19 NOTE — PROGRESS NOTES
Subjective:      Yosef Giordano Jr. is a 63 y.o. male who returns today regarding his BPH.    Previously seen by Dr. Casey. Had TRUS/bx in 2022 that was benign; volume = 96 grams. Added finasteride in 2022 for LUTS    Today he reports improved LUTS since last visit. His AUASS is 2/2.     The following portions of the patient's history were reviewed and updated as appropriate: allergies, current medications, past family history, past medical history, past social history, past surgical history and problem list.    Review of Systems  A comprehensive multipoint review of systems was negative except as otherwise stated in the HPI.     Objective:   Vitals: /77 (BP Location: Left arm, Patient Position: Sitting, BP Method: Large (Automatic))   Pulse 92   Wt 117.8 kg (259 lb 11.2 oz)   BMI 38.35 kg/m²     Physical Exam   General: alert and oriented, no acute distress  Respiratory: Symmetric expansion, non-labored breathing  Neuro: no gross deficits  Psych: normal judgment and insight, normal mood/affect, and non-anxious    Lab Review     Lab Results   Component Value Date    WBC 11.02 06/20/2023    HGB 14.8 06/20/2023    HCT 42.9 06/20/2023    MCV 82 06/20/2023     06/20/2023     Lab Results   Component Value Date    CREATININE 1.0 12/21/2023    BUN 16 12/21/2023     Component      Latest Ref Rng 12/20/2022   PSA Total      0.00 - 4.00 ng/mL 2.3    PSA, Free      0.00 - 1.50 ng/mL 0.47    PSA, Free %      Not established % 20.43          Assessment and Plan:   1. BPH with LUTS  2. Elevated PSA  -- Continue proscar   -- PSA now  -- FU 12 mos w/ PSA

## 2024-03-08 ENCOUNTER — TELEPHONE (OUTPATIENT)
Dept: SPORTS MEDICINE | Facility: CLINIC | Age: 64
End: 2024-03-08
Payer: MEDICARE

## 2024-03-08 ENCOUNTER — PATIENT MESSAGE (OUTPATIENT)
Dept: SPORTS MEDICINE | Facility: CLINIC | Age: 64
End: 2024-03-08
Payer: MEDICARE

## 2024-03-08 DIAGNOSIS — M25.561 RIGHT KNEE PAIN, UNSPECIFIED CHRONICITY: Primary | ICD-10-CM

## 2024-03-08 NOTE — TELEPHONE ENCOUNTER
Attempted to contact patient, no answer and voicemail is full.  Patient has no insurance coverage list for this visit.

## 2024-03-13 ENCOUNTER — OFFICE VISIT (OUTPATIENT)
Dept: ORTHOPEDICS | Facility: CLINIC | Age: 64
End: 2024-03-13
Payer: MEDICARE

## 2024-03-13 VITALS
HEIGHT: 69 IN | DIASTOLIC BLOOD PRESSURE: 84 MMHG | BODY MASS INDEX: 38.37 KG/M2 | WEIGHT: 259.06 LBS | SYSTOLIC BLOOD PRESSURE: 128 MMHG

## 2024-03-13 DIAGNOSIS — M17.11 PRIMARY OSTEOARTHRITIS OF RIGHT KNEE: ICD-10-CM

## 2024-03-13 DIAGNOSIS — G89.29 CHRONIC PAIN OF RIGHT KNEE: Primary | ICD-10-CM

## 2024-03-13 DIAGNOSIS — M25.461 EFFUSION OF BURSA OF RIGHT KNEE: ICD-10-CM

## 2024-03-13 DIAGNOSIS — M25.561 CHRONIC PAIN OF RIGHT KNEE: Primary | ICD-10-CM

## 2024-03-13 PROCEDURE — 99204 OFFICE O/P NEW MOD 45 MIN: CPT | Mod: 25,S$GLB,, | Performed by: STUDENT IN AN ORGANIZED HEALTH CARE EDUCATION/TRAINING PROGRAM

## 2024-03-13 PROCEDURE — 3079F DIAST BP 80-89 MM HG: CPT | Mod: CPTII,S$GLB,, | Performed by: STUDENT IN AN ORGANIZED HEALTH CARE EDUCATION/TRAINING PROGRAM

## 2024-03-13 PROCEDURE — 99999 PR PBB SHADOW E&M-EST. PATIENT-LVL III: CPT | Mod: PBBFAC,,, | Performed by: STUDENT IN AN ORGANIZED HEALTH CARE EDUCATION/TRAINING PROGRAM

## 2024-03-13 PROCEDURE — 1125F AMNT PAIN NOTED PAIN PRSNT: CPT | Mod: CPTII,S$GLB,, | Performed by: STUDENT IN AN ORGANIZED HEALTH CARE EDUCATION/TRAINING PROGRAM

## 2024-03-13 PROCEDURE — 3008F BODY MASS INDEX DOCD: CPT | Mod: CPTII,S$GLB,, | Performed by: STUDENT IN AN ORGANIZED HEALTH CARE EDUCATION/TRAINING PROGRAM

## 2024-03-13 PROCEDURE — 3074F SYST BP LT 130 MM HG: CPT | Mod: CPTII,S$GLB,, | Performed by: STUDENT IN AN ORGANIZED HEALTH CARE EDUCATION/TRAINING PROGRAM

## 2024-03-13 PROCEDURE — 20611 DRAIN/INJ JOINT/BURSA W/US: CPT | Mod: RT,S$GLB,, | Performed by: STUDENT IN AN ORGANIZED HEALTH CARE EDUCATION/TRAINING PROGRAM

## 2024-03-13 PROCEDURE — 1160F RVW MEDS BY RX/DR IN RCRD: CPT | Mod: CPTII,S$GLB,, | Performed by: STUDENT IN AN ORGANIZED HEALTH CARE EDUCATION/TRAINING PROGRAM

## 2024-03-13 PROCEDURE — 4010F ACE/ARB THERAPY RXD/TAKEN: CPT | Mod: CPTII,S$GLB,, | Performed by: STUDENT IN AN ORGANIZED HEALTH CARE EDUCATION/TRAINING PROGRAM

## 2024-03-13 PROCEDURE — 1159F MED LIST DOCD IN RCRD: CPT | Mod: CPTII,S$GLB,, | Performed by: STUDENT IN AN ORGANIZED HEALTH CARE EDUCATION/TRAINING PROGRAM

## 2024-03-13 RX ORDER — TRIAMCINOLONE ACETONIDE 40 MG/ML
40 INJECTION, SUSPENSION INTRA-ARTICULAR; INTRAMUSCULAR
Status: DISCONTINUED | OUTPATIENT
Start: 2024-03-13 | End: 2024-03-13 | Stop reason: HOSPADM

## 2024-03-13 RX ADMIN — TRIAMCINOLONE ACETONIDE 40 MG: 40 INJECTION, SUSPENSION INTRA-ARTICULAR; INTRAMUSCULAR at 10:03

## 2024-03-13 NOTE — PROCEDURES
Large Joint Aspiration/Injection: R knee    Date/Time: 3/13/2024 10:00 AM    Performed by: Laverne Hayden MD  Authorized by: Laverne Hayden MD    Consent Done?:  Yes (Verbal)  Indications:  Arthritis and pain  Site marked: the procedure site was marked    Timeout: prior to procedure the correct patient, procedure, and site was verified    Prep: patient was prepped and draped in usual sterile fashion      Local anesthesia used?: Yes    Anesthesia:  Local infiltration  Local anesthetic:  Co-phenylcaine spray and lidocaine 1% without epinephrine  Anesthetic total (ml):  2      Details:  Needle Size:  25 G and 18 G  Ultrasonic Guidance for needle placement?: Yes (Ultrasound guidance used to avoid neurovascular injury and/or to improve accuracy given body habitus.)    Images are saved and documented.  Approach: Superolateral.  Location:  Knee  Site:  R knee  Medications:  40 mg triamcinolone acetonide 40 mg/mL  Medications comment:  Ropivacaine 0.2% 2mL  Aspirate amount (mL):  1  Aspirate:  Clear and yellow  Patient tolerance:  Patient tolerated the procedure well with no immediate complications     TECHNIQUE: Real time ultrasound examination of the right knee was performed with SonCarefxte Edge 2, 9-L MHz linear probe(s). Ultrasound guidance was used for needle localization. Images were saved and stored for documentation. Dynamic visualization of the needle was continuous throughout the procedures and maintained in good position.

## 2024-03-13 NOTE — PROGRESS NOTES
CC: right knee pain    64 y.o. Male presents today for evaluation of his right knee pain. Pt reports gradual onset of knee pain 2-3 months ago. Pt reports pain is 2/10 today and 8/10 at worst. Pt localizes pain to anteromedial knee. Pt denies mechanical symptoms. Pt denies numbness/tingling.     SYMPTOMS:   Pain Score: 2/10 today, 8/10 at worst  Pain location: anteromedial  Time of onset: 2-3 months  Trauma, injury: gradual onset    Audible pop: no  Clicking: no  Catching: no  Locking: no  Giving out, instability: a few times  Swelling: mild  Theater sign: yes  Problems with stairs: yes    INTERVENTIONS:   Medications tried: aleve   Braces/devices: none  Physical therapy: none  Injections: none    RELEVANT HISTORY:   Imaging to date: 3/13/24  Previous significant knee injuries: R knee meniscus tear 20-25 yrs ago  Previous knee surgeries: R knee meniscus surgery 20-25 yrs ago    Occupation: retired     REVIEW OF SYSTEMS:   Constitution: Patient denies fever or chills.  Eyes: Patient denies eye pain or vision changes.  HEENT: Patient denies ear pain, sore throat, or nasal discharge.  CVS: Patient denies chest pain.  Lungs: Patient denies shortness of breath or cough.  Abdomen: Patient denies any stomach pain, nausea, vomiting, or diarrhea  Skin: Patient denies skin rash or itching.    Musculoskeletal: Patient denies recent injuries or trauma.  Neuro: Patient denies any numbness or tingling in upper or lower extremities.  Psych: Patient denies any current anxiety or nervousness.    PAST MEDICAL HISTORY:   Past Medical History:   Diagnosis Date    Anxiety     Diabetes mellitus, type 2     Hyperlipidemia     Hypertension        PAST SURGICAL HISTORY:  Past Surgical History:   Procedure Laterality Date    KNEE ARTHROPLASTY         FAMILY HISTORY:  Family History   Problem Relation Age of Onset    Cancer Mother        SOCIAL HISTORY:  Social History     Socioeconomic History    Marital status:    Tobacco Use     "Smoking status: Never    Smokeless tobacco: Never   Substance and Sexual Activity    Alcohol use: Not Currently    Drug use: Not Currently       MEDICATIONS:     Current Outpatient Medications:     aspirin 81 MG Chew, Take 81 mg by mouth every other day., Disp: , Rfl:     blood sugar diagnostic Strp, To check BG 1 times daily, to use with insurance preferred meter, Disp: 100 strip, Rfl: 3    dapagliflozin propanediol (FARXIGA) 10 mg tablet, Take 1 tablet (10 mg total) by mouth once daily., Disp: 90 tablet, Rfl: 2    diazePAM (VALIUM) 10 MG Tab, Take 10 mg by mouth 2 (two) times daily as needed., Disp: , Rfl:     finasteride (PROSCAR) 5 mg tablet, Take 1 tablet (5 mg total) by mouth once daily., Disp: 90 tablet, Rfl: 3    glipiZIDE (GLUCOTROL) 2.5 MG TR24, TAKE ONE TABLET BY MOUTH EVERY MORNING with BREAKFAST, Disp: 90 tablet, Rfl: 2    lancets Misc, To check BG 1 times daily, to use with insurance preferred meter, Disp: 100 each, Rfl: 3    losartan (COZAAR) 50 MG tablet, Take 1 tablet (50 mg total) by mouth once daily., Disp: 90 tablet, Rfl: 3    omega 3-dha-epa-fish oil (FISH OIL) 1,000 mg (120 mg-180 mg) Cap, Take 1 capsule by mouth 2 (two) times daily., Disp: 180 capsule, Rfl: 3    pravastatin (PRAVACHOL) 40 MG tablet, Take 1 tablet (40 mg total) by mouth every evening., Disp: 90 tablet, Rfl: 2    sertraline (ZOLOFT) 100 MG tablet, Take 2 tablets (200 mg total) by mouth once daily., Disp: 180 tablet, Rfl: 3    blood-glucose meter kit, To check BG 1 times daily, to use with insurance preferred meter, Disp: 1 each, Rfl: 0    ALLERGIES:   Review of patient's allergies indicates:  No Known Allergies     PHYSICAL EXAMINATION:  /84   Ht 5' 9" (1.753 m)   Wt 117.5 kg (259 lb 0.7 oz)   BMI 38.25 kg/m²   Vitals signs and nursing note have been reviewed.    General: In no acute distress, well developed, well nourished, no diaphoresis  Eyes: EOM full and smooth, no eye redness or discharge  HEENT: normocephalic and " atraumatic, neck supple, trachea midline, no nasal discharge  Cardiovascular: no LE edema  Lungs: respirations non-labored, no conversational dyspnea   Neuro: AAOx3, CN2-12 grossly intact  Skin: No rashes, warm and dry  Psychiatric: cooperative, pleasant, mood and affect appropriate for age    Right Knee:   Gait: wnl    Inspection/Palpation:   -Rubor   -Calor  -Effusion   -Patella ballotable   -Patellar apprehension  -Retinacular tenderness   +Patellar crepitus   Patellar tilt grossly normal     TTP at:  +Medical Joint line   -MCL   -LCL   -Popliteal region   -Quad tendon   -Patella  -Pat tendon  -Pat border  -Med condyle   -Lat condyle   -Pes   -Prox fibula   -Tib tub  -Gerdy's tubercle  -Distal Hamstring tendons  -Proximal Hamstrings/Ischial tuberosity  -ITB    ROM:   Ext: 0°   Flex:145°   Popliteal Angle: 30°   +Discomfort w/ full flex   -Bounce-home discomfort     Ligamentous:   -Ant drawer   -Post drawer   -Lachman's   Good endpoints & no pain w/ valgus & varus stress    Meniscal:  -Rhea's   -Meron   -Thessaly   -Pain w/ squat     Other:  -Patellar apprehension  -Patellar grind  -Ramos's   -J sign  -Shaun's  Abductors 5    IMAGIN. Knee X-ray ordered due to right knee pain. 4 views taken today.   2. X-ray images were reviewed personally by me and then directly with patient.  3. FINDINGS:  Advanced medial compartment degenerative changes, especially on flexion view, no signs of acute fracture or dislocation, soft tissues unremarkable.    4. IMPRESSION:  Kellgren Willem grade 3-4 osteoarthritic changes.  No acute osseous abnormalities appreciated.    Diagnostic Extremity - [facility]-Sports Ultrasound was recommended due to right knee(s) evaluation.    FOCUSED: examination.     TECHNIQUE: Real time ultrasound examination of the right knee was performed with SonSynerZ Medicalte Edge 2, 9-L MHz linear probe.     FINDINGS: The images are of adequate diagnostic quality with identification of all echogenic structures  made except for the vascular structures unless otherwise noted. There is no sonographic evidence of periosteal abnormalities, soft tissue edema, musculotendinous or nerve irregularities. Small effusion. The rest of the sonographic examination was unremarkable.    Ultrasound images were directly reviewed with the patient and then a treatment plan was discussed.     Images were stored in the patients medical record.     IMPRESSION: Small effusion to be aspirated.       ASSESSMENT:      ICD-10-CM ICD-9-CM   1. Chronic pain of right knee  M25.561 719.46    G89.29 338.29   2. Primary osteoarthritis of right knee  M17.11 715.16   3. Effusion of bursa of right knee  M25.461 719.06         PLAN:  Based on patient history, physical exam findings, and imaging I believe the primary osteoarthritis of the patient's right knee is his pain generator.  Lengthy discussion was had with patient today regarding the various treatment options for osteoarthritis of the knee.  These options include:  - corticosteroid injection with triamcinolone  - hyaluronic acid injections  - long-acting corticosteroid injection with Zilretta  - platelet rich plasma  - Iovera  - genicular nerve ablation  - definitive treatment with total knee arthroplasty    We will move forward with ultrasound-guided aspiration (fluid seemingly too thick to be aspirated via an 18g needle) attempt and corticosteroid injection to the right knee.    All questions were answered to the best of my ability and all concerns were addressed at this time.    Follow up in 6 week(s) for above, or sooner if needed.      This note is dictated using the M*Modal Fluency Direct word recognition program. There are word recognition mistakes that are occasionally missed on review.

## 2024-03-24 ENCOUNTER — PATIENT MESSAGE (OUTPATIENT)
Dept: ADMINISTRATIVE | Facility: HOSPITAL | Age: 64
End: 2024-03-24
Payer: MEDICARE

## 2024-03-24 ENCOUNTER — PATIENT MESSAGE (OUTPATIENT)
Dept: ORTHOPEDICS | Facility: CLINIC | Age: 64
End: 2024-03-24
Payer: MEDICARE

## 2024-04-20 DIAGNOSIS — E11.29 TYPE 2 DIABETES MELLITUS WITH MICROALBUMINURIA, WITHOUT LONG-TERM CURRENT USE OF INSULIN: ICD-10-CM

## 2024-04-20 DIAGNOSIS — R80.9 TYPE 2 DIABETES MELLITUS WITH MICROALBUMINURIA, WITHOUT LONG-TERM CURRENT USE OF INSULIN: ICD-10-CM

## 2024-04-22 RX ORDER — DAPAGLIFLOZIN 10 MG/1
10 TABLET, FILM COATED ORAL
Qty: 90 TABLET | Refills: 2 | Status: SHIPPED | OUTPATIENT
Start: 2024-04-22

## 2024-05-07 ENCOUNTER — TELEPHONE (OUTPATIENT)
Dept: DIABETES | Facility: CLINIC | Age: 64
End: 2024-05-07
Payer: MEDICARE

## 2024-05-09 DIAGNOSIS — I10 ESSENTIAL HYPERTENSION, BENIGN: ICD-10-CM

## 2024-05-09 DIAGNOSIS — E78.1 HYPERTRIGLYCERIDEMIA: ICD-10-CM

## 2024-05-09 RX ORDER — LISINOPRIL 10 MG/1
10 TABLET ORAL
Qty: 90 TABLET | Refills: 2 | OUTPATIENT
Start: 2024-05-09

## 2024-05-09 RX ORDER — PRAVASTATIN SODIUM 40 MG/1
40 TABLET ORAL NIGHTLY
Qty: 90 TABLET | Refills: 2 | Status: SHIPPED | OUTPATIENT
Start: 2024-05-09

## 2024-05-24 NOTE — PROGRESS NOTES
Chart review done.  updated. Immunizations reviewed & updated. Care Everywhere updated.  ANUPAM SENT TO DR TEJADA FOR EYE EXAM RESULTS     14-Jun-2024

## 2024-05-30 ENCOUNTER — OFFICE VISIT (OUTPATIENT)
Dept: ORTHOPEDICS | Facility: CLINIC | Age: 64
End: 2024-05-30
Payer: MEDICARE

## 2024-05-30 ENCOUNTER — PATIENT MESSAGE (OUTPATIENT)
Dept: ORTHOPEDICS | Facility: CLINIC | Age: 64
End: 2024-05-30

## 2024-05-30 VITALS — BODY MASS INDEX: 38.37 KG/M2 | WEIGHT: 259.06 LBS | HEIGHT: 69 IN

## 2024-05-30 DIAGNOSIS — M17.11 PRIMARY OSTEOARTHRITIS OF RIGHT KNEE: Primary | ICD-10-CM

## 2024-05-30 PROCEDURE — 99214 OFFICE O/P EST MOD 30 MIN: CPT | Mod: S$GLB,,, | Performed by: ORTHOPAEDIC SURGERY

## 2024-05-30 PROCEDURE — 1160F RVW MEDS BY RX/DR IN RCRD: CPT | Mod: CPTII,S$GLB,, | Performed by: ORTHOPAEDIC SURGERY

## 2024-05-30 PROCEDURE — 3008F BODY MASS INDEX DOCD: CPT | Mod: CPTII,S$GLB,, | Performed by: ORTHOPAEDIC SURGERY

## 2024-05-30 PROCEDURE — 99999 PR PBB SHADOW E&M-EST. PATIENT-LVL II: CPT | Mod: PBBFAC,,, | Performed by: ORTHOPAEDIC SURGERY

## 2024-05-30 PROCEDURE — 1159F MED LIST DOCD IN RCRD: CPT | Mod: CPTII,S$GLB,, | Performed by: ORTHOPAEDIC SURGERY

## 2024-05-30 PROCEDURE — 4010F ACE/ARB THERAPY RXD/TAKEN: CPT | Mod: CPTII,S$GLB,, | Performed by: ORTHOPAEDIC SURGERY

## 2024-05-30 NOTE — PROGRESS NOTES
Past Medical History:   Diagnosis Date    Anxiety     Diabetes mellitus, type 2     Hyperlipidemia     Hypertension        Past Surgical History:   Procedure Laterality Date    KNEE ARTHROPLASTY         Current Outpatient Medications   Medication Sig    aspirin 81 MG Chew Take 81 mg by mouth every other day.    blood sugar diagnostic Strp To check BG 1 times daily, to use with insurance preferred meter    blood-glucose meter kit To check BG 1 times daily, to use with insurance preferred meter    dapagliflozin propanediol (FARXIGA) 10 mg tablet TAKE ONE TABLET BY MOUTH DAILY    diazePAM (VALIUM) 10 MG Tab Take 10 mg by mouth 2 (two) times daily as needed.    finasteride (PROSCAR) 5 mg tablet Take 1 tablet (5 mg total) by mouth once daily.    glipiZIDE (GLUCOTROL) 2.5 MG TR24 TAKE ONE TABLET BY MOUTH EVERY MORNING with BREAKFAST    lancets Misc To check BG 1 times daily, to use with insurance preferred meter    losartan (COZAAR) 50 MG tablet Take 1 tablet (50 mg total) by mouth once daily.    omega 3-dha-epa-fish oil (FISH OIL) 1,000 mg (120 mg-180 mg) Cap Take 1 capsule by mouth 2 (two) times daily.    pravastatin (PRAVACHOL) 40 MG tablet TAKE ONE TABLET BY MOUTH EVERY EVENING    sertraline (ZOLOFT) 100 MG tablet Take 2 tablets (200 mg total) by mouth once daily.     No current facility-administered medications for this visit.       Review of patient's allergies indicates:  No Known Allergies    Family History   Problem Relation Name Age of Onset    Cancer Mother         Social History     Socioeconomic History    Marital status:    Tobacco Use    Smoking status: Never    Smokeless tobacco: Never   Substance and Sexual Activity    Alcohol use: Not Currently    Drug use: Not Currently       Chief Complaint:   Chief Complaint   Patient presents with    Right Knee - Pain       History of present illness:  64-year-old male seen for right knee pain.  Patient has had pain for about a year but worse since January.   Patient saw Dr. Hayden and had his knee injected with cortisone.  No real relief.  Pain along the medial aspect of his knee.  Sharp shooting pain down the tibia at times.  Increased pain at night.  Pain when going from sitting to standing.  Describes as a constant aching throbbing pain.  Pain is a 2/10.        Review of Systems:    Constitution: Negative for chills, fever, and sweats.  Negative for unexplained weight loss.    HENT:  Negative for headaches and blurry vision.    Cardiovascular:Negative for chest pain or irregular heart beat. Negative for hypertension.    Respiratory:  Negative for cough and shortness of breath.    Gastrointestinal: Negative for abdominal pain, heartburn, melena, nausea, and vomitting.    Genitourinary:  Negative bladder incontinence and dysuria.    Musculoskeletal:  See HPI    Neurological: Negative for numbness.    Psychiatric/Behavioral: Negative for depression.  The patient is not nervous/anxious.      Endocrine: Negative for polyuria    Hematologic/Lymphatic: Negative for bleeding problem.  Does not bruise/bleed easily.    Skin: Negative for poor would healing and rash      Physical Examination:    Vital Signs:  There were no vitals filed for this visit.    Body mass index is 38.25 kg/m².    This a well-developed, well nourished patient in no acute distress.  They are alert and oriented and cooperative to examination.  Pt. walks without an antalgic gait.      Examination of the right knee shows no rashes or erythema. There are no masses ecchymosis or effusion. Patient has full range of motion from 0-130°. Patient is nontender to palpation over lateral joint line and Moderately tender to palpation over the medial joint line. Patient has a - Lachman exam, - anterior drawer exam, and - posterior drawer exam. - Apley exam. Knee is stable to varus and valgus stress. 5 out of 5 motor strength. Palpable distal pulses. Intact light touch sensation. Negative Patellofemoral  crepitus      X-rays:  X-ray of the right knee is available for review which show moderate to severe medial joint space narrowing of the right knee         Assessment:  Right knee osteoarthritis    Plan:  I reviewed the findings with him today.  We talked about treatment options for osteoarthritis.  Patient is already tried cortisone without great benefit.  I think he had be a good candidate for viscosupplementation.  We will get authorization for a hyaluronic acid series.    The patient has tried a self guided exercise program that has included walking without significant improvement. Minimal relief with tylenol or OTC Nsaids. Reports less than 8 weeks relief with IA steroid injection. Kellgren Willem scale of 3. They are not receiving another HA injectable at this time. I will precert for gel injection.               All previous pertinent notes including ER visits, physical therapy visits, other orthopedic visits as well as other care for the same musculoskeletal problem were reviewed.  All pertinent lab values and previous imaging was reviewed pertinent to the current visit.    This note was created using [employer] voice recognition software that occasionally misinterpreted phrases or words.    Consult note is delivered via Epic messaging service.

## 2024-06-20 ENCOUNTER — OFFICE VISIT (OUTPATIENT)
Dept: ORTHOPEDICS | Facility: CLINIC | Age: 64
End: 2024-06-20
Payer: MEDICARE

## 2024-06-20 VITALS — WEIGHT: 259.06 LBS | BODY MASS INDEX: 38.37 KG/M2 | HEIGHT: 69 IN | RESPIRATION RATE: 18 BRPM

## 2024-06-20 DIAGNOSIS — E11.9 TYPE 2 DIABETES MELLITUS WITHOUT COMPLICATION, UNSPECIFIED WHETHER LONG TERM INSULIN USE: ICD-10-CM

## 2024-06-20 DIAGNOSIS — M17.11 PRIMARY OSTEOARTHRITIS OF RIGHT KNEE: Primary | ICD-10-CM

## 2024-06-20 PROCEDURE — 99999 PR PBB SHADOW E&M-EST. PATIENT-LVL III: CPT | Mod: PBBFAC,,, | Performed by: ORTHOPAEDIC SURGERY

## 2024-06-20 NOTE — PROCEDURES
Large Joint Aspiration/Injection: R knee joint    Date/Time: 6/20/2024 8:45 AM    Performed by: Lucio Ivey MD  Authorized by: Lucio Ivey MD    Consent Done?:  Yes (Verbal)  Indications:  Pain  Site marked: the procedure site was marked    Timeout: prior to procedure the correct patient, procedure, and site was verified      Details:  Needle Size:  20 G  Approach:  Anterolateral  Location:  Knee  Site:  R knee joint  Medications:  48 mg hylan g-f 20 48 mg/6 mL  Patient tolerance:  Patient tolerated the procedure well with no immediate complications

## 2024-06-20 NOTE — PROGRESS NOTES
Past Medical History:   Diagnosis Date    Anxiety     Diabetes mellitus, type 2     Hyperlipidemia     Hypertension        Past Surgical History:   Procedure Laterality Date    KNEE ARTHROPLASTY         Current Outpatient Medications   Medication Sig    aspirin 81 MG Chew Take 81 mg by mouth every other day.    blood sugar diagnostic Strp To check BG 1 times daily, to use with insurance preferred meter    dapagliflozin propanediol (FARXIGA) 10 mg tablet TAKE ONE TABLET BY MOUTH DAILY    diazePAM (VALIUM) 10 MG Tab Take 10 mg by mouth 2 (two) times daily as needed.    finasteride (PROSCAR) 5 mg tablet Take 1 tablet (5 mg total) by mouth once daily.    glipiZIDE (GLUCOTROL) 2.5 MG TR24 TAKE ONE TABLET BY MOUTH EVERY MORNING with BREAKFAST    lancets Misc To check BG 1 times daily, to use with insurance preferred meter    losartan (COZAAR) 50 MG tablet Take 1 tablet (50 mg total) by mouth once daily.    omega 3-dha-epa-fish oil (FISH OIL) 1,000 mg (120 mg-180 mg) Cap Take 1 capsule by mouth 2 (two) times daily.    pravastatin (PRAVACHOL) 40 MG tablet TAKE ONE TABLET BY MOUTH EVERY EVENING    sertraline (ZOLOFT) 100 MG tablet Take 2 tablets (200 mg total) by mouth once daily.    blood-glucose meter kit To check BG 1 times daily, to use with insurance preferred meter     No current facility-administered medications for this visit.       Review of patient's allergies indicates:  No Known Allergies    Family History   Problem Relation Name Age of Onset    Cancer Mother         Social History     Socioeconomic History    Marital status:    Tobacco Use    Smoking status: Never    Smokeless tobacco: Never   Substance and Sexual Activity    Alcohol use: Not Currently    Drug use: Not Currently       Chief Complaint:   Chief Complaint   Patient presents with    Follow-up     Right knee synvisc one       History of present illness:  64-year-old male seen for right knee pain.  Patient has had pain for about a year but  worse since January.  Patient saw Dr. Hayden and had his knee injected with cortisone.  No real relief.  Pain along the medial aspect of his knee.  Sharp shooting pain down the tibia at times.  Increased pain at night.  Pain when going from sitting to standing.  Describes as a constant aching throbbing pain.  Pain is a 2/10.        Review of Systems:    Constitution: Negative for chills, fever, and sweats.  Negative for unexplained weight loss.    HENT:  Negative for headaches and blurry vision.    Cardiovascular:Negative for chest pain or irregular heart beat. Negative for hypertension.    Respiratory:  Negative for cough and shortness of breath.    Gastrointestinal: Negative for abdominal pain, heartburn, melena, nausea, and vomitting.    Genitourinary:  Negative bladder incontinence and dysuria.    Musculoskeletal:  See HPI    Neurological: Negative for numbness.    Psychiatric/Behavioral: Negative for depression.  The patient is not nervous/anxious.      Endocrine: Negative for polyuria    Hematologic/Lymphatic: Negative for bleeding problem.  Does not bruise/bleed easily.    Skin: Negative for poor would healing and rash      Physical Examination:    Vital Signs:    Vitals:    06/20/24 0841   Resp: 18       Body mass index is 38.25 kg/m².    This a well-developed, well nourished patient in no acute distress.  They are alert and oriented and cooperative to examination.  Pt. walks without an antalgic gait.      Examination of the right knee shows no rashes or erythema. There are no masses ecchymosis or effusion. Patient has full range of motion from 0-130°. Patient is nontender to palpation over lateral joint line and Moderately tender to palpation over the medial joint line. Patient has a - Lachman exam, - anterior drawer exam, and - posterior drawer exam. - Apley exam. Knee is stable to varus and valgus stress. 5 out of 5 motor strength. Palpable distal pulses. Intact light touch sensation. Negative  Patellofemoral crepitus      X-rays:  X-ray of the right knee is available for review which show moderate to severe medial joint space narrowing of the right knee         Assessment:  Right knee osteoarthritis    Plan:  I reviewed the findings with him today.  I injected his right knee with Synvisc-One today.  Follow up as needed.    The patient has tried a self guided exercise program that has included walking without significant improvement. Minimal relief with tylenol or OTC Nsaids. Reports less than 8 weeks relief with IA steroid injection. Kellgren Willem scale of 3. They are not receiving another HA injectable at this time. I will precert for gel injection.               All previous pertinent notes including ER visits, physical therapy visits, other orthopedic visits as well as other care for the same musculoskeletal problem were reviewed.  All pertinent lab values and previous imaging was reviewed pertinent to the current visit.    This note was created using [employer] voice recognition software that occasionally misinterpreted phrases or words.    Consult note is delivered via Epic messaging service.

## 2024-07-09 ENCOUNTER — TELEPHONE (OUTPATIENT)
Dept: DIABETES | Facility: CLINIC | Age: 64
End: 2024-07-09
Payer: MEDICARE

## 2024-07-10 ENCOUNTER — OFFICE VISIT (OUTPATIENT)
Dept: DIABETES | Facility: CLINIC | Age: 64
End: 2024-07-10
Payer: MEDICARE

## 2024-07-10 VITALS
BODY MASS INDEX: 39.18 KG/M2 | HEART RATE: 84 BPM | WEIGHT: 264.5 LBS | SYSTOLIC BLOOD PRESSURE: 138 MMHG | OXYGEN SATURATION: 98 % | DIASTOLIC BLOOD PRESSURE: 82 MMHG | HEIGHT: 69 IN

## 2024-07-10 DIAGNOSIS — E78.1 HYPERTRIGLYCERIDEMIA: ICD-10-CM

## 2024-07-10 DIAGNOSIS — R79.89 ELEVATED TSH: ICD-10-CM

## 2024-07-10 DIAGNOSIS — E11.42 TYPE 2 DIABETES MELLITUS WITH DIABETIC POLYNEUROPATHY, WITHOUT LONG-TERM CURRENT USE OF INSULIN: ICD-10-CM

## 2024-07-10 DIAGNOSIS — E78.2 MIXED HYPERLIPIDEMIA: ICD-10-CM

## 2024-07-10 DIAGNOSIS — Z87.19 HISTORY OF PANCREATITIS: ICD-10-CM

## 2024-07-10 DIAGNOSIS — E11.69 TYPE 2 DIABETES MELLITUS WITH HYPERLIPIDEMIA: ICD-10-CM

## 2024-07-10 DIAGNOSIS — F41.1 GAD (GENERALIZED ANXIETY DISORDER): ICD-10-CM

## 2024-07-10 DIAGNOSIS — E78.5 TYPE 2 DIABETES MELLITUS WITH HYPERLIPIDEMIA: ICD-10-CM

## 2024-07-10 DIAGNOSIS — E11.29 TYPE 2 DIABETES MELLITUS WITH MICROALBUMINURIA, WITHOUT LONG-TERM CURRENT USE OF INSULIN: Primary | ICD-10-CM

## 2024-07-10 DIAGNOSIS — R80.9 TYPE 2 DIABETES MELLITUS WITH MICROALBUMINURIA, WITHOUT LONG-TERM CURRENT USE OF INSULIN: Primary | ICD-10-CM

## 2024-07-10 DIAGNOSIS — I10 ESSENTIAL HYPERTENSION, BENIGN: ICD-10-CM

## 2024-07-10 DIAGNOSIS — E66.01 CLASS 2 SEVERE OBESITY DUE TO EXCESS CALORIES WITH SERIOUS COMORBIDITY AND BODY MASS INDEX (BMI) OF 39.0 TO 39.9 IN ADULT: ICD-10-CM

## 2024-07-10 PROCEDURE — 4010F ACE/ARB THERAPY RXD/TAKEN: CPT | Mod: CPTII,S$GLB,, | Performed by: NURSE PRACTITIONER

## 2024-07-10 PROCEDURE — 3075F SYST BP GE 130 - 139MM HG: CPT | Mod: CPTII,S$GLB,, | Performed by: NURSE PRACTITIONER

## 2024-07-10 PROCEDURE — 3066F NEPHROPATHY DOC TX: CPT | Mod: CPTII,S$GLB,, | Performed by: NURSE PRACTITIONER

## 2024-07-10 PROCEDURE — 3288F FALL RISK ASSESSMENT DOCD: CPT | Mod: CPTII,S$GLB,, | Performed by: NURSE PRACTITIONER

## 2024-07-10 PROCEDURE — 3044F HG A1C LEVEL LT 7.0%: CPT | Mod: CPTII,S$GLB,, | Performed by: NURSE PRACTITIONER

## 2024-07-10 PROCEDURE — 3060F POS MICROALBUMINURIA REV: CPT | Mod: CPTII,S$GLB,, | Performed by: NURSE PRACTITIONER

## 2024-07-10 PROCEDURE — 99214 OFFICE O/P EST MOD 30 MIN: CPT | Mod: S$GLB,,, | Performed by: NURSE PRACTITIONER

## 2024-07-10 PROCEDURE — 1159F MED LIST DOCD IN RCRD: CPT | Mod: CPTII,S$GLB,, | Performed by: NURSE PRACTITIONER

## 2024-07-10 PROCEDURE — 3079F DIAST BP 80-89 MM HG: CPT | Mod: CPTII,S$GLB,, | Performed by: NURSE PRACTITIONER

## 2024-07-10 PROCEDURE — 1101F PT FALLS ASSESS-DOCD LE1/YR: CPT | Mod: CPTII,S$GLB,, | Performed by: NURSE PRACTITIONER

## 2024-07-10 PROCEDURE — 1160F RVW MEDS BY RX/DR IN RCRD: CPT | Mod: CPTII,S$GLB,, | Performed by: NURSE PRACTITIONER

## 2024-07-10 PROCEDURE — 99999 PR PBB SHADOW E&M-EST. PATIENT-LVL IV: CPT | Mod: PBBFAC,,, | Performed by: NURSE PRACTITIONER

## 2024-07-10 PROCEDURE — 3008F BODY MASS INDEX DOCD: CPT | Mod: CPTII,S$GLB,, | Performed by: NURSE PRACTITIONER

## 2024-07-10 RX ORDER — LOSARTAN POTASSIUM 100 MG/1
100 TABLET ORAL DAILY
Qty: 90 TABLET | Refills: 3 | Status: SHIPPED | OUTPATIENT
Start: 2024-07-10 | End: 2025-07-10

## 2024-07-10 RX ORDER — GLUCOSAM/CHONDRO/HERB 149/HYAL 750-100 MG
1 TABLET ORAL 2 TIMES DAILY
COMMUNITY
Start: 2024-07-10 | End: 2025-07-10

## 2024-07-10 RX ORDER — PRAVASTATIN SODIUM 40 MG/1
40 TABLET ORAL NIGHTLY
Qty: 90 TABLET | Refills: 2 | Status: SHIPPED | OUTPATIENT
Start: 2024-07-10

## 2024-07-10 RX ORDER — DAPAGLIFLOZIN 10 MG/1
10 TABLET, FILM COATED ORAL DAILY
Qty: 90 TABLET | Refills: 2 | Status: SHIPPED | OUTPATIENT
Start: 2024-07-10

## 2024-07-10 RX ORDER — GLIPIZIDE 2.5 MG/1
TABLET, EXTENDED RELEASE ORAL
Qty: 90 TABLET | Refills: 2 | Status: SHIPPED | OUTPATIENT
Start: 2024-07-10

## 2024-07-10 NOTE — PROGRESS NOTES
CC:   Chief Complaint   Patient presents with    Diabetes Mellitus       HPI: Yosef Giordano Jr. is a 64 y.o. male presents for a follow up visit today for the management of T2DM.     He was diagnosed with Type 2 diabetes in 4719-7831 on routine lab work and initally started on metformin. He reports severe GI upset with Metformin use. Never on insulin therapy.     Family hx of diabetes: sister- prediabetes   Hospitalized for diabetes: denies     No personal or FH of thyroid cancer or personal of pancreatic cancer   + pancreatitis while on Trulicity       Our last visit was in January 2024   At that visit we continued his regimen of Farxiga and Glipizide   Recent A1c is 6.1%   Denies hypoglycemia   We changed his Lisinopril to Losartan   No hypotension or s/s of hypotension          DIABETES COMPLICATIONS: nephropathy and peripheral neuropathy      Diabetes Management Status    ASA:  --81 mg every other day     Statin: Taking--pravastatin 40 mg nightly + fish oil OTC -- 1 cap per day   ACE/ARB: Taking--Losartan 50 mg daily       The 10-year ASCVD risk score (Jesse CONNOR, et al., 2019) is: 29.1%    Values used to calculate the score:      Age: 64 years      Sex: Male      Is Non- : No      Diabetic: Yes      Tobacco smoker: No      Systolic Blood Pressure: 138 mmHg      Is BP treated: Yes      HDL Cholesterol: 42 mg/dL      Total Cholesterol: 167 mg/dL      Screening or Prevention Patient's value Goal Complete/Controlled?   HgA1C Testing and Control   Lab Results   Component Value Date    HGBA1C 6.1 (H) 07/09/2024      Annually/Less than 8% No   Lipid profile : 07/09/2024 Annually Yes   LDL control Lab Results   Component Value Date    LDLCALC 89.6 07/09/2024    Annually/Less than 100 mg/dl  Yes   Nephropathy screening Lab Results   Component Value Date    LABMICR 36.0 07/09/2024     Lab Results   Component Value Date    PROTEINUA 1+ (A) 01/02/2019    Annually Yes   Blood pressure BP Readings  from Last 1 Encounters:   07/10/24 138/82    Less than 140/90 Yes   Dilated retinal exam : 06/12/2023 - external Dr. Melendrez  Annually No   Foot exam   : 01/02/2024 Annually Yes       CURRENT A1C:    Hemoglobin A1C   Date Value Ref Range Status   07/09/2024 6.1 (H) 4.0 - 5.6 % Final     Comment:     ADA Screening Guidelines:  5.7-6.4%  Consistent with prediabetes  >or=6.5%  Consistent with diabetes    High levels of fetal hemoglobin interfere with the HbA1C  assay. Heterozygous hemoglobin variants (HbS, HgC, etc)do  not significantly interfere with this assay.   However, presence of multiple variants may affect accuracy.     12/21/2023 6.4 (H) 4.0 - 5.6 % Final     Comment:     ADA Screening Guidelines:  5.7-6.4%  Consistent with prediabetes  >or=6.5%  Consistent with diabetes    High levels of fetal hemoglobin interfere with the HbA1C  assay. Heterozygous hemoglobin variants (HbS, HgC, etc)do  not significantly interfere with this assay.   However, presence of multiple variants may affect accuracy.     06/20/2023 6.6 (H) 4.0 - 5.6 % Final     Comment:     ADA Screening Guidelines:  5.7-6.4%  Consistent with prediabetes  >or=6.5%  Consistent with diabetes    High levels of fetal hemoglobin interfere with the HbA1C  assay. Heterozygous hemoglobin variants (HbS, HgC, etc)do  not significantly interfere with this assay.   However, presence of multiple variants may affect accuracy.         GOAL A1C: 6.5% without hypoglycemia       DM MEDICATIONS USED IN THE PAST: Metformin- severe GI upset   Glipizide   Trulicity-- off due to pancreatitis   Farxiga       CURRENT DIABETES MEDICATIONS:   Farxiga 10 mg daily and Glipizide 2.5 mg daily with 1st meal of the day   Insulin: N/A   Missed doses: rarely       BLOOD GLUCOSE MONITORING: He is periodically checking his BG --1 time per week   Per oral recall -  Under 150  120's   Never over 200   Every once in a while 180's         HYPOGLYCEMIA: denies         MEALS: eating 2-3 meals  per day -- usually eating 2 meals   Trying to cut back on breads   BF: eggs   Lunch: left overs   Dinner: grilled meat and veggies   Snack: SF pudding or pickles- cheese its -- most salty than sweet   Drinks: mostly water   No juices or sodas          CURRENT EXERCISE:  none          Review of Systems  Review of Systems   Constitutional:  Negative for appetite change, fatigue and unexpected weight change.   HENT:  Negative for trouble swallowing.    Eyes:  Negative for visual disturbance.   Respiratory:  Negative for shortness of breath.    Cardiovascular:  Negative for chest pain.   Gastrointestinal:  Negative for nausea.   Endocrine: Negative for polydipsia, polyphagia and polyuria.   Genitourinary:         No Nocturia    Musculoskeletal:  Negative for arthralgias.   Skin:  Negative for wound.   Neurological:  Negative for numbness.       Physical Exam   Physical Exam  Vitals and nursing note reviewed.   Constitutional:       Appearance: He is well-developed. He is obese.   HENT:      Head: Normocephalic and atraumatic.      Right Ear: External ear normal.      Left Ear: External ear normal.      Nose: Nose normal.   Neck:      Thyroid: No thyromegaly.      Trachea: No tracheal deviation.   Cardiovascular:      Rate and Rhythm: Normal rate and regular rhythm.      Heart sounds: No murmur heard.  Pulmonary:      Effort: Pulmonary effort is normal. No respiratory distress.      Breath sounds: Normal breath sounds.   Abdominal:      Palpations: Abdomen is soft.      Tenderness: There is no abdominal tenderness.      Hernia: No hernia is present.   Musculoskeletal:      Cervical back: Normal range of motion and neck supple.   Skin:     General: Skin is warm and dry.      Capillary Refill: Capillary refill takes less than 2 seconds.      Findings: No rash.   Neurological:      Mental Status: He is alert and oriented to person, place, and time.      Cranial Nerves: No cranial nerve deficit.   Psychiatric:          Behavior: Behavior normal.         Judgment: Judgment normal.           FOOT EXAMINATION: Appropriate footwear             Lab Results   Component Value Date    TSH 4.829 (H) 07/09/2024           Type 2 diabetes mellitus with microalbuminuria, without long-term current use of insulin  Well Controlled  Denies hypoglycemia   Recommend exercise   Recommend more frequent SBGM       Adhesive issues with the freestyle selam -- wishes to remain off of it.       Remain off of Trulicity due to pancreatitis      -- Medication Changes:  Continue Farxiga 10 mg daily and glipizide 2.5 mg daily with 1st meal of the day  Reviewed proper glipizide use to prevent hypoglycemia  Hold Glipizide if BG in AM is <100        -- Reviewed goals of therapy are to get the best control we can without hypoglycemia.    -- Advised frequent self blood glucose monitoring.  Patient encouraged to document glucose results and bring them to every clinic visit. - daily at alternating times-- at least 3 times per week at alternating times   -- Hypoglycemia precautions discussed. Instructed on precautions before driving.    -- Call for Bg repeatedly < 70 or > 180.   -- Close adherence to lifestyle changes recommended.   -- Periodic follow ups for eye evaluations, foot care and dental care suggested.        Regarding microalbuminuria:  Optimize BG readings.   See above.   Improving   On  SGLT2  Increase Losartan to 100 mg daily   BMP in 3 months     Type 2 diabetes mellitus with diabetic polyneuropathy, without long-term current use of insulin  Optimize BG readings.   See above.       Educated patient to check feet daily for any foreign objects and/or wounds. Discussed with patient the importance of wearing appropriate footwear at all times, not to walk barefoot ever, and to check shoes before putting them on feet. Instructed patient to keep feet dry by regularly changing shoes and socks and drying feet after baths and exercises. Also, instructed patient to  report any new lesions, discolorations, or swelling to a healthcare professional.        Class 2 severe obesity due to excess calories with serious comorbidity and body mass index (BMI) of 39.0 to 39.9 in adult  Body mass index is 39.06 kg/m².  Increases insulin resistance.   Discussed DM diet and exercise.       Mixed hyperlipidemia  On statin per ADA recommendations  LDL goal < 100. LDL at goal. LFTs WNL. Continue statin and fish oil   Recommend fish oil 1 gram BID     Essential hypertension, benign  BP goal is < 140/90.   Tolerating ARB   Controlled   Increasing the Losartan to 100 mg daily to help with Urine MAC   Blood pressure goals discussed with patient  Encouraged patient to monitor blood pressure at home      History of pancreatitis  Avoiding GLP1 and DPP4     JOVITA (generalized anxiety disorder)  Follow up with PCP         I spent a total of 30 minutes on the day of the visit.This includes face to face time and non-face to face time preparing to see the patient (eg, review of tests), obtaining and/or reviewing separately obtained history, documenting clinical information in the electronic or other health record, independently interpreting results and communicating results to the patient/family/caregiver, or care coordinator.        Follow up in about 6 months (around 1/10/2025).  1. BMP in 3 months   2. Follow up with me in 6 months with labs prior       Orders Placed This Encounter   Procedures    Basic Metabolic Panel     Standing Status:   Future     Standing Expiration Date:   1/10/2026    Hemoglobin A1C     Standing Status:   Future     Standing Expiration Date:   1/10/2026    Comprehensive Metabolic Panel     Standing Status:   Future     Standing Expiration Date:   1/10/2026    Lipid Panel     Standing Status:   Future     Standing Expiration Date:   1/10/2026    Microalbumin/Creatinine Ratio, Urine     Standing Status:   Future     Standing Expiration Date:   1/10/2026     Order Specific Question:    Specimen Source     Answer:   Urine    TSH     Standing Status:   Future     Standing Expiration Date:   1/10/2026    THYROID PEROXIDASE ANTIBODY     Standing Status:   Future     Standing Expiration Date:   1/10/2026       Recommendations were discussed with the patient in detail  The patient verbalized understanding and agrees with the plan outlined as above.     This note was partly generated with [employer] voice recognition software. I apologize for any possible typographical errors.

## 2024-07-10 NOTE — ASSESSMENT & PLAN NOTE
Body mass index is 39.06 kg/m².  Increases insulin resistance.   Discussed DM diet and exercise.

## 2024-07-10 NOTE — ASSESSMENT & PLAN NOTE
On statin per ADA recommendations  LDL goal < 100. LDL at goal. LFTs WNL. Continue statin and fish oil   Recommend fish oil 1 gram BID

## 2024-07-10 NOTE — ASSESSMENT & PLAN NOTE
Well Controlled  Denies hypoglycemia   Recommend exercise   Recommend more frequent SBGM       Adhesive issues with the freestyle selam -- wishes to remain off of it.       Remain off of Trulicity due to pancreatitis      -- Medication Changes:  Continue Farxiga 10 mg daily and glipizide 2.5 mg daily with 1st meal of the day  Reviewed proper glipizide use to prevent hypoglycemia  Hold Glipizide if BG in AM is <100        -- Reviewed goals of therapy are to get the best control we can without hypoglycemia.    -- Advised frequent self blood glucose monitoring.  Patient encouraged to document glucose results and bring them to every clinic visit. - daily at alternating times-- at least 3 times per week at alternating times   -- Hypoglycemia precautions discussed. Instructed on precautions before driving.    -- Call for Bg repeatedly < 70 or > 180.   -- Close adherence to lifestyle changes recommended.   -- Periodic follow ups for eye evaluations, foot care and dental care suggested.        Regarding microalbuminuria:  Optimize BG readings.   See above.   Improving   On  SGLT2  Increase Losartan to 100 mg daily   BMP in 3 months

## 2024-07-10 NOTE — ASSESSMENT & PLAN NOTE
BP goal is < 140/90.   Tolerating ARB   Controlled   Increasing the Losartan to 100 mg daily to help with Urine MAC   Blood pressure goals discussed with patient  Encouraged patient to monitor blood pressure at home

## 2024-07-31 ENCOUNTER — PATIENT OUTREACH (OUTPATIENT)
Dept: ADMINISTRATIVE | Facility: HOSPITAL | Age: 64
End: 2024-07-31
Payer: MEDICARE

## 2024-07-31 NOTE — PROGRESS NOTES
Health Maintenance Due   Topic Date Due    RSV Vaccine (Age 60+ and Pregnant patients) (1 - 1-dose 60+ series) Never done    Pneumococcal Vaccines (Age 65+) (2 of 2 - PCV) 09/03/2021    Eye Exam  06/12/2024        Chart review done.    updated.   Immunizations reviewed & updated.   Care Everywhere updated.

## 2024-08-14 ENCOUNTER — OFFICE VISIT (OUTPATIENT)
Dept: PRIMARY CARE CLINIC | Facility: CLINIC | Age: 64
End: 2024-08-14
Payer: MEDICARE

## 2024-08-14 ENCOUNTER — CLINICAL SUPPORT (OUTPATIENT)
Dept: PRIMARY CARE CLINIC | Facility: CLINIC | Age: 64
End: 2024-08-14
Attending: FAMILY MEDICINE
Payer: MEDICARE

## 2024-08-14 VITALS
HEART RATE: 76 BPM | DIASTOLIC BLOOD PRESSURE: 74 MMHG | WEIGHT: 268.94 LBS | OXYGEN SATURATION: 97 % | SYSTOLIC BLOOD PRESSURE: 118 MMHG | RESPIRATION RATE: 20 BRPM | BODY MASS INDEX: 39.83 KG/M2 | TEMPERATURE: 97 F | HEIGHT: 69 IN

## 2024-08-14 DIAGNOSIS — Z23 NEED FOR VACCINATION: ICD-10-CM

## 2024-08-14 DIAGNOSIS — E11.29 TYPE 2 DIABETES MELLITUS WITH MICROALBUMINURIA, WITHOUT LONG-TERM CURRENT USE OF INSULIN: ICD-10-CM

## 2024-08-14 DIAGNOSIS — E78.2 MIXED HYPERLIPIDEMIA: ICD-10-CM

## 2024-08-14 DIAGNOSIS — R80.9 TYPE 2 DIABETES MELLITUS WITH MICROALBUMINURIA, WITHOUT LONG-TERM CURRENT USE OF INSULIN: ICD-10-CM

## 2024-08-14 DIAGNOSIS — F41.1 GAD (GENERALIZED ANXIETY DISORDER): ICD-10-CM

## 2024-08-14 DIAGNOSIS — I10 ESSENTIAL HYPERTENSION, BENIGN: ICD-10-CM

## 2024-08-14 DIAGNOSIS — E11.9 TYPE 2 DIABETES MELLITUS WITHOUT COMPLICATION, UNSPECIFIED WHETHER LONG TERM INSULIN USE: ICD-10-CM

## 2024-08-14 DIAGNOSIS — Z00.00 ANNUAL PHYSICAL EXAM: Primary | ICD-10-CM

## 2024-08-14 PROCEDURE — 2025F 7 FLD RTA PHOTO W/O RTNOPTHY: CPT | Mod: CPTII,S$GLB,, | Performed by: OPTOMETRIST

## 2024-08-14 PROCEDURE — 1159F MED LIST DOCD IN RCRD: CPT | Mod: CPTII,S$GLB,, | Performed by: FAMILY MEDICINE

## 2024-08-14 PROCEDURE — 90677 PCV20 VACCINE IM: CPT | Mod: S$GLB,,, | Performed by: FAMILY MEDICINE

## 2024-08-14 PROCEDURE — 99999 PR PBB SHADOW E&M-EST. PATIENT-LVL IV: CPT | Mod: PBBFAC,,, | Performed by: FAMILY MEDICINE

## 2024-08-14 PROCEDURE — 3008F BODY MASS INDEX DOCD: CPT | Mod: CPTII,S$GLB,, | Performed by: FAMILY MEDICINE

## 2024-08-14 PROCEDURE — G0009 ADMIN PNEUMOCOCCAL VACCINE: HCPCS | Mod: S$GLB,,, | Performed by: FAMILY MEDICINE

## 2024-08-14 PROCEDURE — 4010F ACE/ARB THERAPY RXD/TAKEN: CPT | Mod: CPTII,S$GLB,, | Performed by: FAMILY MEDICINE

## 2024-08-14 PROCEDURE — 3066F NEPHROPATHY DOC TX: CPT | Mod: CPTII,S$GLB,, | Performed by: FAMILY MEDICINE

## 2024-08-14 PROCEDURE — 1160F RVW MEDS BY RX/DR IN RCRD: CPT | Mod: CPTII,S$GLB,, | Performed by: FAMILY MEDICINE

## 2024-08-14 PROCEDURE — 3078F DIAST BP <80 MM HG: CPT | Mod: CPTII,S$GLB,, | Performed by: FAMILY MEDICINE

## 2024-08-14 PROCEDURE — 99397 PER PM REEVAL EST PAT 65+ YR: CPT | Mod: S$GLB,,, | Performed by: FAMILY MEDICINE

## 2024-08-14 PROCEDURE — 3074F SYST BP LT 130 MM HG: CPT | Mod: CPTII,S$GLB,, | Performed by: FAMILY MEDICINE

## 2024-08-14 PROCEDURE — 3288F FALL RISK ASSESSMENT DOCD: CPT | Mod: CPTII,S$GLB,, | Performed by: FAMILY MEDICINE

## 2024-08-14 PROCEDURE — 3060F POS MICROALBUMINURIA REV: CPT | Mod: CPTII,S$GLB,, | Performed by: FAMILY MEDICINE

## 2024-08-14 PROCEDURE — 3044F HG A1C LEVEL LT 7.0%: CPT | Mod: CPTII,S$GLB,, | Performed by: FAMILY MEDICINE

## 2024-08-14 PROCEDURE — 92228 IMG RTA DETC/MNTR DS PHY/QHP: CPT | Mod: TC,S$GLB,, | Performed by: FAMILY MEDICINE

## 2024-08-14 PROCEDURE — 1101F PT FALLS ASSESS-DOCD LE1/YR: CPT | Mod: CPTII,S$GLB,, | Performed by: FAMILY MEDICINE

## 2024-08-14 PROCEDURE — 92228 IMG RTA DETC/MNTR DS PHY/QHP: CPT | Mod: 26,S$GLB,, | Performed by: OPTOMETRIST

## 2024-08-14 RX ORDER — LORAZEPAM 1 MG/1
1 TABLET ORAL DAILY PRN
Qty: 20 TABLET | Refills: 0 | Status: SHIPPED | OUTPATIENT
Start: 2024-08-14

## 2024-08-14 NOTE — PROGRESS NOTES
Yosef QUENTIN Giordano Jr. is a 64 y.o. male here for a diabetic eye screening with non-dilated fundus photos per .    Patient cooperative?: Yes  Small pupils?: No  Last eye exam:     For exam results, see Encounter Report.

## 2024-08-14 NOTE — PROGRESS NOTES
Patient Name: Yosef Giordano Jr.  YOB: 1960 (64 y.o.)  MRN: [id number]  Today's Date: 08/14/2024      SUBJECTIVE:     Chief Complaint: Anxiety    History of Present Illness:  Yosef Giordano Jr. is a 64 y.o. male who presents for evaluation and treatment of his anxiety. He is currently taking sertraline 100 mg BID. He states he sometimes has episodes of anxiety where he feels tightness in his chest and feels extremely anxious.This happens about once a month. Denies chest pain or chest tightness otherwise. Reports occasional trouble sleeping due to feeling stressed. Reports good appetite.  He has some diazepam at home that is several years old that he reports taking about once a month. He states this helps his symptoms. He feels the sertraline generally helps him but he would like a prescription to help with acute episodes of anxiety. He has previously seen a counselor for depression. He felt this was helpful. He also has a lump on his wrist that he would like evaluated. It appeared about a month ago. No injury to the wrist. Not painful or bothersome.         Review of patient's allergies indicates:  No Known Allergies    Past Medical History:   Diagnosis Date    Anxiety     Diabetes mellitus, type 2     Hyperlipidemia     Hypertension      Past Surgical History:   Procedure Laterality Date    KNEE ARTHROPLASTY       Family History   Problem Relation Name Age of Onset    Cancer Mother       Social History     Tobacco Use    Smoking status: Never    Smokeless tobacco: Never   Substance Use Topics    Alcohol use: Not Currently    Drug use: Not Currently        Review of Systems:  Review of Systems   HENT:  Positive for hearing loss.         Has made appointment for hearing evaluation. Wife says he needs his hearing checked   Eyes:  Negative for discharge.   Respiratory:  Negative for wheezing.    Cardiovascular:  Negative for chest pain, palpitations and leg swelling.   Gastrointestinal:  Negative for  blood in stool, constipation, diarrhea and vomiting.   Genitourinary:  Negative for hematuria and urgency.   Musculoskeletal:  Negative for neck pain.   Neurological:  Negative for weakness and headaches.   Endo/Heme/Allergies:  Negative for polydipsia.       OBJECTIVE:     Vital Signs (Most Recent)  There were no vitals taken for this visit.    Physical Exam:  Physical Exam  Constitutional:       Appearance: Normal appearance.   HENT:      Head: Normocephalic.   Cardiovascular:      Rate and Rhythm: Normal rate and regular rhythm.      Pulses: Normal pulses.   Pulmonary:      Effort: Pulmonary effort is normal. No respiratory distress.      Breath sounds: Normal breath sounds.   Neurological:      General: No focal deficit present.      Mental Status: He is alert.   Psychiatric:         Mood and Affect: Mood normal.         Behavior: Behavior normal.         Thought Content: Thought content normal.             ASSESSMENT/PLAN:     Yosef Giordano Jr. is a 64 y.o. male presenting for evaluation of anxiety, ganglion cyst    Anxiety  -Continue Sertraline 100mg BID  -Lorazepam PRN for breakthrough anxiety    2. Ganglion Cyst   -Observation, referral to orthopedics if bothersome    Giovanna Colindres, MS4    Annual physical exam    JOVITA (generalized anxiety disorder)  -     LORazepam (ATIVAN) 1 MG tablet; Take 1 tablet (1 mg total) by mouth daily as needed for Anxiety.  Dispense: 20 tablet; Refill: 0  Advised to use lorazepam sparingly  Type 2 diabetes mellitus with microalbuminuria, without long-term current use of insulin  Well controlled, followed by diabetes management  Need for vaccination  -     pneumoc 20-gainna conj-dip cr(PF) (PREVNAR-20 (PF)) injection Syrg 0.5 mL    Essential hypertension, benign  Stable  Mixed hyperlipidemia  Continue statin    I hereby acknowledge that I am relying upon documentation authored by a medical student working under my supervision and further I hereby attest that I have verified the  student documentation or findings by personally re-performing the physical exam and medical decision making activities of the Evaluation and Management service to be billed.  Sami Hernadez

## 2024-08-28 ENCOUNTER — PATIENT MESSAGE (OUTPATIENT)
Dept: DIABETES | Facility: CLINIC | Age: 64
End: 2024-08-28
Payer: MEDICARE

## 2024-09-17 RX ORDER — SERTRALINE HYDROCHLORIDE 100 MG/1
200 TABLET, FILM COATED ORAL DAILY
Qty: 180 TABLET | Refills: 3 | Status: SHIPPED | OUTPATIENT
Start: 2024-09-17

## 2024-09-17 NOTE — TELEPHONE ENCOUNTER
No care due was identified.  [facility] Embedded Care Due Messages. Reference number: [id number].   9/17/2024 11:59:25 AM CDT

## 2024-09-17 NOTE — TELEPHONE ENCOUNTER
Refill Decision Note   Yosef Giuliana  is requesting a refill authorization.  Brief Assessment and Rationale for Refill:  Approve     Medication Therapy Plan:        Comments:     Note composed:4:07 PM 09/17/2024

## 2024-09-19 ENCOUNTER — PATIENT MESSAGE (OUTPATIENT)
Dept: PRIMARY CARE CLINIC | Facility: CLINIC | Age: 64
End: 2024-09-19
Payer: MEDICARE

## 2024-09-25 ENCOUNTER — PATIENT MESSAGE (OUTPATIENT)
Dept: DIABETES | Facility: CLINIC | Age: 64
End: 2024-09-25
Payer: MEDICARE

## 2024-10-15 ENCOUNTER — PATIENT MESSAGE (OUTPATIENT)
Dept: DIABETES | Facility: CLINIC | Age: 64
End: 2024-10-15
Payer: MEDICARE

## 2024-10-28 ENCOUNTER — PATIENT MESSAGE (OUTPATIENT)
Dept: PRIMARY CARE CLINIC | Facility: CLINIC | Age: 64
End: 2024-10-28
Payer: MEDICARE

## 2024-12-17 ENCOUNTER — TELEPHONE (OUTPATIENT)
Dept: SPORTS MEDICINE | Facility: CLINIC | Age: 64
End: 2024-12-17
Payer: MEDICARE

## 2024-12-17 NOTE — TELEPHONE ENCOUNTER
"Called pt to determine if pt would like to see a surgeon due to appt reason being "knee replacement." Unable to leave  due to mailbox being full; sent PM.    Jenn Hopkins MS, OTC  Clinical Assistant to Dr. Laverne Hayden    "

## 2024-12-18 ENCOUNTER — TELEPHONE (OUTPATIENT)
Dept: ORTHOPEDICS | Facility: CLINIC | Age: 64
End: 2024-12-18
Payer: MEDICARE

## 2024-12-28 DIAGNOSIS — E78.1 HYPERTRIGLYCERIDEMIA: ICD-10-CM

## 2024-12-28 DIAGNOSIS — E78.2 MIXED HYPERLIPIDEMIA: ICD-10-CM

## 2024-12-29 ENCOUNTER — PATIENT MESSAGE (OUTPATIENT)
Dept: DIABETES | Facility: CLINIC | Age: 64
End: 2024-12-29

## 2024-12-29 DIAGNOSIS — R80.9 TYPE 2 DIABETES MELLITUS WITH MICROALBUMINURIA, WITHOUT LONG-TERM CURRENT USE OF INSULIN: Primary | ICD-10-CM

## 2024-12-29 DIAGNOSIS — E11.29 TYPE 2 DIABETES MELLITUS WITH MICROALBUMINURIA, WITHOUT LONG-TERM CURRENT USE OF INSULIN: Primary | ICD-10-CM

## 2024-12-30 ENCOUNTER — TELEPHONE (OUTPATIENT)
Dept: PHARMACY | Facility: CLINIC | Age: 64
End: 2024-12-30

## 2024-12-30 RX ORDER — PRAVASTATIN SODIUM 40 MG/1
40 TABLET ORAL NIGHTLY
Qty: 90 TABLET | Refills: 2 | Status: SHIPPED | OUTPATIENT
Start: 2024-12-30

## 2024-12-30 NOTE — TELEPHONE ENCOUNTER
We have reached out to Mr. Giordano via letter and portal message  to inform him of the [employer] application process for Farxiga. A follow-up phone call will be made in 5 business days.    Sundar Barajas  Pharmacy Patient Assistance Team

## 2024-12-30 NOTE — LETTER
December 30, 2024    Yosef QUENTIN Giordano Jr.  [address] [address]               Dear Mr. Giordano,      My name is Sundar Barajas  I am reaching out on behalf of [facility]s Pharmacy Patient Assistance Team in regards to your request for medication assistance. Our goal is to assist qualified [facility] patients with obtaining financial assistance for prescribed medications.     Please note that enrollment into available support may require the following documents:      Proof of household Income (such as social security statement, pension statement,most recently filed taxes or 3 consecutive pay stubs)  Copy of all insurance cards (front and back)  Print out from your insurance or pharmacy showing how much you have spent on prescriptions for the current year  Completed [facility] Authorization Forms       Please reach out to my phone number below if you are still in need of assistance with your medications. Follow-up call will be made in 5 business days. We look forward to hearing from you soon!    Thank you for choosing [facility] for your healthcare needs      Sincerely  Sundar CALVILLO @[phone]  Pharmacy Patient Assistance Team  [address] [place] [address]  [id number] [place] [id number]  Fax: [phone]  Email: [email or web address]

## 2025-01-25 ENCOUNTER — TELEPHONE (OUTPATIENT)
Dept: DIABETES | Facility: CLINIC | Age: 65
End: 2025-01-25
Payer: MEDICARE

## 2025-01-28 ENCOUNTER — CLINICAL SUPPORT (OUTPATIENT)
Dept: DIABETES | Facility: CLINIC | Age: 65
End: 2025-01-28
Payer: MEDICARE

## 2025-01-28 ENCOUNTER — TELEPHONE (OUTPATIENT)
Dept: DIABETES | Facility: CLINIC | Age: 65
End: 2025-01-28

## 2025-01-28 DIAGNOSIS — E11.65 CONTROLLED TYPE 2 DIABETES MELLITUS WITH HYPERGLYCEMIA, WITHOUT LONG-TERM CURRENT USE OF INSULIN: Primary | ICD-10-CM

## 2025-01-28 NOTE — TELEPHONE ENCOUNTER
----- Message from Nurse Alvares sent at 1/28/2025  2:44 PM CST -----  I asked pt about paperwork, stated he was not going to worry about pharmacy assistance, just wanted to let you know

## 2025-02-14 DIAGNOSIS — E11.29 TYPE 2 DIABETES MELLITUS WITH MICROALBUMINURIA, WITHOUT LONG-TERM CURRENT USE OF INSULIN: ICD-10-CM

## 2025-02-14 DIAGNOSIS — R80.9 TYPE 2 DIABETES MELLITUS WITH MICROALBUMINURIA, WITHOUT LONG-TERM CURRENT USE OF INSULIN: ICD-10-CM

## 2025-02-14 RX ORDER — DAPAGLIFLOZIN 10 MG/1
10 TABLET, FILM COATED ORAL
Qty: 90 TABLET | Refills: 0 | Status: SHIPPED | OUTPATIENT
Start: 2025-02-14

## 2025-03-20 ENCOUNTER — TELEPHONE (OUTPATIENT)
Dept: UROLOGY | Facility: CLINIC | Age: 65
End: 2025-03-20
Payer: MEDICARE

## 2025-03-20 NOTE — TELEPHONE ENCOUNTER
Staff called pt to inform him that in order to receive refill, he would need to be seen in office. Pt was scheduled and informed of appt time/date. Pt confirmed and voiced understanding.

## 2025-03-24 DIAGNOSIS — Z00.00 ENCOUNTER FOR MEDICARE ANNUAL WELLNESS EXAM: ICD-10-CM

## 2025-04-25 ENCOUNTER — TELEPHONE (OUTPATIENT)
Dept: DIABETES | Facility: CLINIC | Age: 65
End: 2025-04-25
Payer: MEDICARE

## 2025-04-28 ENCOUNTER — CLINICAL SUPPORT (OUTPATIENT)
Dept: DIABETES | Facility: CLINIC | Age: 65
End: 2025-04-28
Payer: MEDICARE

## 2025-04-28 ENCOUNTER — OFFICE VISIT (OUTPATIENT)
Dept: DIABETES | Facility: CLINIC | Age: 65
End: 2025-04-28
Payer: MEDICARE

## 2025-04-28 VITALS
DIASTOLIC BLOOD PRESSURE: 78 MMHG | HEART RATE: 83 BPM | OXYGEN SATURATION: 97 % | BODY MASS INDEX: 40.36 KG/M2 | WEIGHT: 272.5 LBS | HEIGHT: 69 IN | SYSTOLIC BLOOD PRESSURE: 124 MMHG

## 2025-04-28 DIAGNOSIS — E11.65 CONTROLLED TYPE 2 DIABETES MELLITUS WITH HYPERGLYCEMIA, WITH LONG-TERM CURRENT USE OF INSULIN: Primary | ICD-10-CM

## 2025-04-28 DIAGNOSIS — Z87.19 HISTORY OF PANCREATITIS: ICD-10-CM

## 2025-04-28 DIAGNOSIS — E78.2 MIXED HYPERLIPIDEMIA: ICD-10-CM

## 2025-04-28 DIAGNOSIS — Z79.4 CONTROLLED TYPE 2 DIABETES MELLITUS WITH HYPERGLYCEMIA, WITH LONG-TERM CURRENT USE OF INSULIN: Primary | ICD-10-CM

## 2025-04-28 DIAGNOSIS — E66.01 CLASS 3 SEVERE OBESITY DUE TO EXCESS CALORIES WITH SERIOUS COMORBIDITY AND BODY MASS INDEX (BMI) OF 40.0 TO 44.9 IN ADULT: ICD-10-CM

## 2025-04-28 DIAGNOSIS — E11.65 TYPE 2 DIABETES MELLITUS WITH HYPERGLYCEMIA, WITHOUT LONG-TERM CURRENT USE OF INSULIN: Primary | ICD-10-CM

## 2025-04-28 DIAGNOSIS — Z12.5 PROSTATE CANCER SCREENING: ICD-10-CM

## 2025-04-28 DIAGNOSIS — E11.42 TYPE 2 DIABETES MELLITUS WITH DIABETIC POLYNEUROPATHY, WITHOUT LONG-TERM CURRENT USE OF INSULIN: ICD-10-CM

## 2025-04-28 DIAGNOSIS — R80.9 TYPE 2 DIABETES MELLITUS WITH MICROALBUMINURIA, WITHOUT LONG-TERM CURRENT USE OF INSULIN: ICD-10-CM

## 2025-04-28 DIAGNOSIS — E66.813 CLASS 3 SEVERE OBESITY DUE TO EXCESS CALORIES WITH SERIOUS COMORBIDITY AND BODY MASS INDEX (BMI) OF 40.0 TO 44.9 IN ADULT: ICD-10-CM

## 2025-04-28 DIAGNOSIS — E11.29 TYPE 2 DIABETES MELLITUS WITH MICROALBUMINURIA, WITHOUT LONG-TERM CURRENT USE OF INSULIN: ICD-10-CM

## 2025-04-28 DIAGNOSIS — Z71.9 HEALTH EDUCATION/COUNSELING: ICD-10-CM

## 2025-04-28 DIAGNOSIS — Z59.9 FINANCIAL DIFFICULTIES: ICD-10-CM

## 2025-04-28 DIAGNOSIS — I10 ESSENTIAL HYPERTENSION, BENIGN: ICD-10-CM

## 2025-04-28 PROCEDURE — 3051F HG A1C>EQUAL 7.0%<8.0%: CPT | Mod: CPTII,S$GLB,, | Performed by: NURSE PRACTITIONER

## 2025-04-28 PROCEDURE — 99999 PR PBB SHADOW E&M-EST. PATIENT-LVL IV: CPT | Mod: PBBFAC,,, | Performed by: NURSE PRACTITIONER

## 2025-04-28 PROCEDURE — 3008F BODY MASS INDEX DOCD: CPT | Mod: CPTII,S$GLB,, | Performed by: NURSE PRACTITIONER

## 2025-04-28 PROCEDURE — 1160F RVW MEDS BY RX/DR IN RCRD: CPT | Mod: CPTII,S$GLB,, | Performed by: NURSE PRACTITIONER

## 2025-04-28 PROCEDURE — 3288F FALL RISK ASSESSMENT DOCD: CPT | Mod: CPTII,S$GLB,, | Performed by: NURSE PRACTITIONER

## 2025-04-28 PROCEDURE — 1159F MED LIST DOCD IN RCRD: CPT | Mod: CPTII,S$GLB,, | Performed by: NURSE PRACTITIONER

## 2025-04-28 PROCEDURE — 4010F ACE/ARB THERAPY RXD/TAKEN: CPT | Mod: CPTII,S$GLB,, | Performed by: NURSE PRACTITIONER

## 2025-04-28 PROCEDURE — 3074F SYST BP LT 130 MM HG: CPT | Mod: CPTII,S$GLB,, | Performed by: NURSE PRACTITIONER

## 2025-04-28 PROCEDURE — 1126F AMNT PAIN NOTED NONE PRSNT: CPT | Mod: CPTII,S$GLB,, | Performed by: NURSE PRACTITIONER

## 2025-04-28 PROCEDURE — 99214 OFFICE O/P EST MOD 30 MIN: CPT | Mod: S$GLB,,, | Performed by: NURSE PRACTITIONER

## 2025-04-28 PROCEDURE — 1101F PT FALLS ASSESS-DOCD LE1/YR: CPT | Mod: CPTII,S$GLB,, | Performed by: NURSE PRACTITIONER

## 2025-04-28 PROCEDURE — 3078F DIAST BP <80 MM HG: CPT | Mod: CPTII,S$GLB,, | Performed by: NURSE PRACTITIONER

## 2025-04-28 RX ORDER — DAPAGLIFLOZIN 10 MG/1
10 TABLET, FILM COATED ORAL DAILY
Qty: 30 TABLET | Refills: 6 | Status: SHIPPED | OUTPATIENT
Start: 2025-04-28

## 2025-04-28 SDOH — SOCIAL DETERMINANTS OF HEALTH (SDOH): PROBLEM RELATED TO HOUSING AND ECONOMIC CIRCUMSTANCES, UNSPECIFIED: Z59.9

## 2025-04-28 NOTE — PROGRESS NOTES
CC:   Chief Complaint   Patient presents with    Diabetes Mellitus       HPI: Yosef Giordano Jr. is a 65 y.o. male presents for a follow up visit today for the management of T2DM.     He was diagnosed with Type 2 diabetes in [id number] on routine lab work and initally started on metformin. He reports severe GI upset with Metformin use. Never on insulin therapy.     Family hx of diabetes: sister- prediabetes   Hospitalized for diabetes: denies     No personal or FH of thyroid cancer or personal of pancreatic cancer   + pancreatitis while on Trulicity       Our last visit was in July of 2024  At that visit we continue Farxiga 10 mg daily and continue glipizide 2.5 mg daily  Reviewed proper glipizide use to prevent hypoglycemia  Hold the glipizide if blood sugars less than 100  At the end of 2024 I placed a referral to pharmacy assistance for the Farxiga due to the cost increasing---he said he has been using the Farxiga samples and has not had to buy it yet?  Not sure how he has so many samples  Concerned that maybe he may be missing doses  He says that he is due for refill soon  In January his A1c had increased from 6.1% to 7.1%  Reportedly he is having fasting hyperglycemia but then running in the 140s throughout the day?  He is interested in a personal CGM           DIABETES COMPLICATIONS: nephropathy and peripheral neuropathy      Diabetes Management Status    ASA:  --81 mg daily     Statin: Taking--pravastatin 40 mg nightly + fish oil OTC -- 2 cap per day   ACE/ARB: Taking--Losartan 100 mg daily       The ASCVD Risk score (Jesse CONNOR, et al., 2019) failed to calculate for the following reasons:    The valid total cholesterol range is 130 to 320 mg/dL      Screening or Prevention Patient's value Goal Complete/Controlled?   HgA1C Testing and Control   Lab Results   Component Value Date    HGBA1C 7.1 (H) 01/03/2025      Annually/Less than 8% No   Lipid profile : 01/03/2025 Annually Yes   LDL control Lab Results    Component Value Date    LDLCALC 56.8 (L) 01/03/2025    Annually/Less than 100 mg/dl  Yes   Nephropathy screening Lab Results   Component Value Date    LABMICR 36.0 07/09/2024     Lab Results   Component Value Date    PROTEINUA 1+ (A) 01/02/2019    Annually Yes   Blood pressure BP Readings from Last 1 Encounters:   04/28/25 124/78    Less than 140/90 Yes   Dilated retinal exam : 08/14/2024 - external Dr. Melendrez  Annually No   Foot exam   : 04/28/2025 Annually Yes       CURRENT A1C:    Hemoglobin A1C   Date Value Ref Range Status   01/03/2025 7.1 (H) 4.0 - 5.6 % Final     Comment:     ADA Screening Guidelines:  5.7-6.4%  Consistent with prediabetes  >or=6.5%  Consistent with diabetes    High levels of fetal hemoglobin interfere with the HbA1C  assay. Heterozygous hemoglobin variants (HbS, HgC, etc)do  not significantly interfere with this assay.   However, presence of multiple variants may affect accuracy.     07/09/2024 6.1 (H) 4.0 - 5.6 % Final     Comment:     ADA Screening Guidelines:  5.7-6.4%  Consistent with prediabetes  >or=6.5%  Consistent with diabetes    High levels of fetal hemoglobin interfere with the HbA1C  assay. Heterozygous hemoglobin variants (HbS, HgC, etc)do  not significantly interfere with this assay.   However, presence of multiple variants may affect accuracy.     12/21/2023 6.4 (H) 4.0 - 5.6 % Final     Comment:     ADA Screening Guidelines:  5.7-6.4%  Consistent with prediabetes  >or=6.5%  Consistent with diabetes    High levels of fetal hemoglobin interfere with the HbA1C  assay. Heterozygous hemoglobin variants (HbS, HgC, etc)do  not significantly interfere with this assay.   However, presence of multiple variants may affect accuracy.         GOAL A1C: 6.5% without hypoglycemia       DM MEDICATIONS USED IN THE PAST: Metformin- severe GI upset   Glipizide   Trulicity-- off due to pancreatitis   Farxiga       CURRENT DIABETES MEDICATIONS:   Farxiga 10 mg daily and Glipizide 2.5 mg daily  with 1st meal of the day   Insulin: N/A   Missed doses: rarely         BLOOD GLUCOSE MONITORING: He is periodically checking his BG --1 time per week   Per oral recall -  AM: 190 today this AM   Yesterday afternoon was 140's   Mostly 140's         HYPOGLYCEMIA: denies         MEALS: eating 2-3 meals per day -- usually eating 2 meals   Trying to cut back on breads   BF: eggs   Lunch: left overs -- sometimes skips   Dinner: grilled meat and veggies   Snack: SF pudding or pickles- cheese its -- most salty than sweet -- rarely snacking thought   Drinks: mostly water   No juices or sodas          CURRENT EXERCISE:  none          Review of Systems  Review of Systems   Constitutional:  Negative for appetite change, fatigue and unexpected weight change.   HENT:  Negative for trouble swallowing.    Eyes:  Negative for visual disturbance.   Respiratory:  Negative for shortness of breath.    Cardiovascular:  Negative for chest pain.   Gastrointestinal:  Negative for nausea.   Endocrine: Negative for polydipsia, polyphagia and polyuria.   Genitourinary:         No Nocturia    Musculoskeletal:  Negative for arthralgias.   Skin:  Negative for wound.   Neurological:  Negative for numbness.       Physical Exam   Physical Exam  Vitals and nursing note reviewed.   Constitutional:       Appearance: He is well-developed. He is obese.   HENT:      Head: Normocephalic and atraumatic.      Right Ear: External ear normal.      Left Ear: External ear normal.      Nose: Nose normal.   Neck:      Thyroid: No thyromegaly.      Trachea: No tracheal deviation.   Cardiovascular:      Rate and Rhythm: Normal rate and regular rhythm.      Heart sounds: No murmur heard.  Pulmonary:      Effort: Pulmonary effort is normal. No respiratory distress.      Breath sounds: Normal breath sounds.   Abdominal:      Palpations: Abdomen is soft.      Tenderness: There is no abdominal tenderness.      Hernia: No hernia is present.   Musculoskeletal:       Cervical back: Normal range of motion and neck supple.   Skin:     General: Skin is warm and dry.      Capillary Refill: Capillary refill takes less than 2 seconds.      Findings: No rash.   Neurological:      Mental Status: He is alert and oriented to person, place, and time.      Cranial Nerves: No cranial nerve deficit.   Psychiatric:         Behavior: Behavior normal.         Judgment: Judgment normal.           FOOT EXAMINATION: Appropriate footwear     Protective Sensation (w/ 10 gram monofilament):  Right: Intact  Left: Intact    Visual Inspection:  Normal -  Bilateral, Nails Intact - without Evidence of Foot Deformity- Bilateral, and Onychomycosis -  Bilateral    Pedal Pulses:   Right: Present  Left: Present    Posterior Tibialis Pulses:   Right:Present  Left: Present              Lab Results   Component Value Date    TSH 3.643 01/03/2025           Type 2 diabetes mellitus with microalbuminuria, without long-term current use of insulin  Repeat A1c  Last A1c was up to 7.1% from 6.1%  I am concerned that part of this is due to the cost of the Farxiga  Recommending that he follow up with pharmacy assistance to see if he can get the Farxiga for free  He is interested in a personal CGM---today we are letting him try the Richi 3+ sensor-- he will let me know if he likes it and would like for to be sent in---I do believe a personal CGM will help him with self awareness and self accountability  Denies hypoglycemia   Recommend exercise         Remain off of Trulicity due to pancreatitis      -- Medication Changes:  Continue Farxiga 10 mg daily and glipizide 2.5 mg daily with 1st meal of the day  Reviewed proper glipizide use to prevent hypoglycemia  Hold Glipizide if BG in AM is <100    Will check lab work and see where A1c stands----if A1c remains above 7% may consider increasing the glipizide to 5 mg daily    A referral was placed to pharmacy assistance to help with the cost of the Farxiga      -- Reviewed goals of  therapy are to get the best control we can without hypoglycemia.    -- Advised frequent self blood glucose monitoring.  Patient encouraged to document glucose results and bring them to every clinic visit. - libre3+--sample sensor applied in clinic today---he will let me know if he likes it and would like for me to send in---he is aware he would need a sliding scale  -- Hypoglycemia precautions discussed. Instructed on precautions before driving.    -- Call for Bg repeatedly < 70 or > 180.   -- Close adherence to lifestyle changes recommended.   -- Periodic follow ups for eye evaluations, foot care and dental care suggested.        Regarding microalbuminuria:  Optimize BG readings.   See above.   Improving   On  SGLT2  On Losartan 100 mg daily     Type 2 diabetes mellitus with hyperglycemia, without long-term current use of insulin  See above     Type 2 diabetes mellitus with diabetic polyneuropathy, without long-term current use of insulin  Optimize BG readings.   See above.       Educated patient to check feet daily for any foreign objects and/or wounds. Discussed with patient the importance of wearing appropriate footwear at all times, not to walk barefoot ever, and to check shoes before putting them on feet. Instructed patient to keep feet dry by regularly changing shoes and socks and drying feet after baths and exercises. Also, instructed patient to report any new lesions, discolorations, or swelling to a healthcare professional.        Class 3 severe obesity due to excess calories with serious comorbidity and body mass index (BMI) of 40.0 to 44.9 in adult  Body mass index is 40.24 kg/m².  Increases insulin resistance.   Discussed DM diet and exercise.       Mixed hyperlipidemia  On statin per ADA recommendations  LDL goal < 100. LDL at goal. LFTs WNL. Continue statin and fish oil   Triglycerides are improving    Essential hypertension, benign  BP goal is < 140/90.   Tolerating ARB   Controlled   Blood pressure  goals discussed with patient  Encouraged patient to monitor blood pressure at home      History of pancreatitis  Avoiding GLP1 and DPP4           I spent a total of 30 minutes on the day of the visit.This includes face to face time and non-face to face time preparing to see the patient (eg, review of tests), obtaining and/or reviewing separately obtained history, documenting clinical information in the electronic or other health record, independently interpreting results and communicating results to the patient/family/caregiver, or care coordinator.        Follow up in about 4 months (around 8/28/2025).  1. Sign pharmacy assistance paperwork today please   2. Schedule a nurse visit for him to bring his SS award letter and insurance cards   3.  Richi 3 sample today please   4. Labs today   5. Follow up with me in 4 months       Orders Placed This Encounter   Procedures    PSA, Screening     Standing Status:   Future     Expected Date:   4/28/2025     Expiration Date:   10/28/2026     Send normal result to authorizing provider's In Basket if patient is active on MyChart::   Yes    Hemoglobin A1C     Standing Status:   Future     Expected Date:   4/28/2025     Expiration Date:   10/28/2026    Basic Metabolic Panel     Standing Status:   Future     Expected Date:   4/28/2025     Expiration Date:   10/28/2026    Microalbumin/Creatinine Ratio, Urine     Standing Status:   Future     Expected Date:   4/28/2025     Expiration Date:   10/28/2026     Specimen Source:   Urine    Ambulatory referral/consult to Pharmacy Assistance     Standing Status:   Future     Expected Date:   4/28/2025     Expiration Date:   10/28/2026     Referral Priority:   Routine     Referral Type:   Consultation     Referral Reason:   Specialty Services Required     Number of Visits Requested:   1       Recommendations were discussed with the patient in detail  The patient verbalized understanding and agrees with the plan outlined as above.     This  note was partly generated with [employer] voice recognition software. I apologize for any possible typographical errors.

## 2025-04-28 NOTE — PROGRESS NOTES
Using aseptic technique, selam 3 sensor placed on the back of the right arm, sensor paired to phone.

## 2025-04-28 NOTE — ASSESSMENT & PLAN NOTE
Body mass index is 40.24 kg/m².  Increases insulin resistance.   Discussed DM diet and exercise.

## 2025-04-28 NOTE — PROGRESS NOTES
Duplicate referral. Last referral received 12/30/25      Mr. Giordano hasn't returned calls, messages and /or requested documentation to begin the assistance process. After multiple unsuccessful attempts to reach him over 10 business days. A final letter was mailed on 01/25/2025.  Please advise patient to return requested documentation to Patient Assistance team (contact info located at bottom of letter) to begin application process. Questions or concerns may be directed to assigned advocate Sundar CALVILLO @[phone] or [email or web address].         Any follow-up questions or concerns may be directed to Mr. Giordano's  assigned advocate Sundar CALVILLO @[phone].     Thank You  Pharmacy Patient Assistance Team

## 2025-04-28 NOTE — ASSESSMENT & PLAN NOTE
On statin per ADA recommendations  LDL goal < 100. LDL at goal. LFTs WNL. Continue statin and fish oil   Triglycerides are improving

## 2025-04-28 NOTE — ASSESSMENT & PLAN NOTE
BP goal is < 140/90.   Tolerating ARB   Controlled   Blood pressure goals discussed with patient  Encouraged patient to monitor blood pressure at home

## 2025-04-29 ENCOUNTER — PATIENT MESSAGE (OUTPATIENT)
Dept: DIABETES | Facility: CLINIC | Age: 65
End: 2025-04-29
Payer: MEDICARE

## 2025-04-29 DIAGNOSIS — M17.11 PRIMARY OSTEOARTHRITIS OF RIGHT KNEE: ICD-10-CM

## 2025-04-30 ENCOUNTER — RESULTS FOLLOW-UP (OUTPATIENT)
Dept: DIABETES | Facility: CLINIC | Age: 65
End: 2025-04-30

## 2025-04-30 DIAGNOSIS — R80.9 TYPE 2 DIABETES MELLITUS WITH MICROALBUMINURIA, WITHOUT LONG-TERM CURRENT USE OF INSULIN: ICD-10-CM

## 2025-04-30 DIAGNOSIS — E11.29 TYPE 2 DIABETES MELLITUS WITH MICROALBUMINURIA, WITHOUT LONG-TERM CURRENT USE OF INSULIN: ICD-10-CM

## 2025-04-30 RX ORDER — GLIPIZIDE 2.5 MG/1
2.5-5 TABLET, EXTENDED RELEASE ORAL
Qty: 180 TABLET | Refills: 2 | Status: SHIPPED | OUTPATIENT
Start: 2025-04-30

## 2025-05-05 ENCOUNTER — PATIENT MESSAGE (OUTPATIENT)
Dept: DIABETES | Facility: CLINIC | Age: 65
End: 2025-05-05

## 2025-05-05 ENCOUNTER — OFFICE VISIT (OUTPATIENT)
Dept: UROLOGY | Facility: CLINIC | Age: 65
End: 2025-05-05
Payer: MEDICARE

## 2025-05-05 VITALS
HEIGHT: 69 IN | DIASTOLIC BLOOD PRESSURE: 78 MMHG | WEIGHT: 273.56 LBS | HEART RATE: 82 BPM | BODY MASS INDEX: 40.52 KG/M2 | SYSTOLIC BLOOD PRESSURE: 134 MMHG

## 2025-05-05 DIAGNOSIS — N40.1 BPH WITH OBSTRUCTION/LOWER URINARY TRACT SYMPTOMS: Primary | ICD-10-CM

## 2025-05-05 DIAGNOSIS — N13.8 BPH WITH OBSTRUCTION/LOWER URINARY TRACT SYMPTOMS: Primary | ICD-10-CM

## 2025-05-05 LAB
BILIRUB SERPL-MCNC: NEGATIVE MG/DL
BLOOD URINE, POC: NORMAL
CLARITY, POC UA: CLEAR
COLOR, POC UA: YELLOW
GLUCOSE UR QL STRIP: NEGATIVE
KETONES UR QL STRIP: NEGATIVE
LEUKOCYTE ESTERASE URINE, POC: NEGATIVE
NITRITE, POC UA: NEGATIVE
PH, POC UA: 5
PROTEIN, POC: NEGATIVE
SPECIFIC GRAVITY, POC UA: 1.01
UROBILINOGEN, POC UA: NORMAL

## 2025-05-05 PROCEDURE — 3078F DIAST BP <80 MM HG: CPT | Mod: CPTII,S$GLB,, | Performed by: STUDENT IN AN ORGANIZED HEALTH CARE EDUCATION/TRAINING PROGRAM

## 2025-05-05 PROCEDURE — 99999 PR PBB SHADOW E&M-EST. PATIENT-LVL III: CPT | Mod: PBBFAC,,, | Performed by: STUDENT IN AN ORGANIZED HEALTH CARE EDUCATION/TRAINING PROGRAM

## 2025-05-05 PROCEDURE — 81002 URINALYSIS NONAUTO W/O SCOPE: CPT | Mod: S$GLB,,, | Performed by: STUDENT IN AN ORGANIZED HEALTH CARE EDUCATION/TRAINING PROGRAM

## 2025-05-05 PROCEDURE — 99214 OFFICE O/P EST MOD 30 MIN: CPT | Mod: S$GLB,,, | Performed by: STUDENT IN AN ORGANIZED HEALTH CARE EDUCATION/TRAINING PROGRAM

## 2025-05-05 PROCEDURE — 4010F ACE/ARB THERAPY RXD/TAKEN: CPT | Mod: CPTII,S$GLB,, | Performed by: STUDENT IN AN ORGANIZED HEALTH CARE EDUCATION/TRAINING PROGRAM

## 2025-05-05 PROCEDURE — 3288F FALL RISK ASSESSMENT DOCD: CPT | Mod: CPTII,S$GLB,, | Performed by: STUDENT IN AN ORGANIZED HEALTH CARE EDUCATION/TRAINING PROGRAM

## 2025-05-05 PROCEDURE — 3044F HG A1C LEVEL LT 7.0%: CPT | Mod: CPTII,S$GLB,, | Performed by: STUDENT IN AN ORGANIZED HEALTH CARE EDUCATION/TRAINING PROGRAM

## 2025-05-05 PROCEDURE — 1101F PT FALLS ASSESS-DOCD LE1/YR: CPT | Mod: CPTII,S$GLB,, | Performed by: STUDENT IN AN ORGANIZED HEALTH CARE EDUCATION/TRAINING PROGRAM

## 2025-05-05 PROCEDURE — 3075F SYST BP GE 130 - 139MM HG: CPT | Mod: CPTII,S$GLB,, | Performed by: STUDENT IN AN ORGANIZED HEALTH CARE EDUCATION/TRAINING PROGRAM

## 2025-05-05 PROCEDURE — 1159F MED LIST DOCD IN RCRD: CPT | Mod: CPTII,S$GLB,, | Performed by: STUDENT IN AN ORGANIZED HEALTH CARE EDUCATION/TRAINING PROGRAM

## 2025-05-05 PROCEDURE — 3060F POS MICROALBUMINURIA REV: CPT | Mod: CPTII,S$GLB,, | Performed by: STUDENT IN AN ORGANIZED HEALTH CARE EDUCATION/TRAINING PROGRAM

## 2025-05-05 PROCEDURE — 1126F AMNT PAIN NOTED NONE PRSNT: CPT | Mod: CPTII,S$GLB,, | Performed by: STUDENT IN AN ORGANIZED HEALTH CARE EDUCATION/TRAINING PROGRAM

## 2025-05-05 PROCEDURE — 3008F BODY MASS INDEX DOCD: CPT | Mod: CPTII,S$GLB,, | Performed by: STUDENT IN AN ORGANIZED HEALTH CARE EDUCATION/TRAINING PROGRAM

## 2025-05-05 PROCEDURE — 3066F NEPHROPATHY DOC TX: CPT | Mod: CPTII,S$GLB,, | Performed by: STUDENT IN AN ORGANIZED HEALTH CARE EDUCATION/TRAINING PROGRAM

## 2025-05-05 RX ORDER — FINASTERIDE 5 MG/1
5 TABLET, FILM COATED ORAL DAILY
Qty: 90 TABLET | Refills: 3 | Status: SHIPPED | OUTPATIENT
Start: 2025-05-05 | End: 2026-05-05

## 2025-05-05 NOTE — PROGRESS NOTES
"[facility] [address]   Clinic Note    SUBJECTIVE:     Chief Complaint: BPH with LUTS    History of Present Illness:  Yosef Giordano Jr. is a 65 y.o. male who presents to clinic for BPH with LUTS. He is established to our clinic but new to me. Seen last by Dr. Robb 01/2024.    Had TRUS/bx in 2022 that was benign; volume = 96 grams. Added finasteride in 2022 for LUTS.  Latest PSA 1.47 on finasteride.  LUTS well controlled.      OBJECTIVE:     Estimated body mass index is 40.4 kg/m² as calculated from the following:    Height as of this encounter: 5' 9" (1.753 m).    Weight as of this encounter: 124.1 kg (273 lb 9.5 oz).    Vital Signs (Most Recent)  Pulse: 82 (05/05/25 0826)  BP: 134/78 (05/05/25 0826)    Physical Exam  Vitals reviewed.   Constitutional:       Appearance: Normal appearance.   HENT:      Head: Normocephalic and atraumatic.   Cardiovascular:      Rate and Rhythm: Normal rate.   Pulmonary:      Effort: Pulmonary effort is normal.   Abdominal:      General: Abdomen is flat. There is no distension.      Tenderness: There is no abdominal tenderness.   Musculoskeletal:         General: Normal range of motion.   Skin:     General: Skin is warm and dry.   Neurological:      General: No focal deficit present.      Mental Status: He is alert and oriented to person, place, and time.   Psychiatric:         Mood and Affect: Mood normal.         Behavior: Behavior normal.         Thought Content: Thought content normal.         Judgment: Judgment normal.         Lab Results   Component Value Date    BUN 17 04/28/2025    CREATININE 0.9 04/28/2025    WBC 8.01 07/09/2024    HGB 15.8 07/09/2024    HCT 45.1 07/09/2024     07/09/2024    AST 22 01/03/2025    ALT 25 01/03/2025    ALKPHOS 74 01/03/2025    ALBUMIN 4.2 01/03/2025    HGBA1C 6.7 (H) 04/28/2025        Lab Results   Component Value Date    PSA 1.47 04/28/2025    PSA 2.1 01/26/2024    PSA 4.4 (H) 12/15/2021    PSA 2.9 09/08/2020    PSADIAG 3.2 " 11/10/2022    PSAFREE 0.47 12/20/2022    PSAFREE 0.84 01/24/2022    PSAFREEPCT 20.43 12/20/2022    PSAFREEPCT 13.33 01/24/2022       ASSESSMENT     1. BPH with obstruction/lower urinary tract symptoms      PLAN:   1. BPH with obstruction/lower urinary tract symptoms  -     POCT urine dipstick without microscope    Other orders  -     finasteride (PROSCAR) 5 mg tablet; Take 1 tablet (5 mg total) by mouth once daily.  Dispense: 90 tablet; Refill: 3     Continue finasteride.   RTC 1 year.    Omkar Villavicencio MD

## 2025-05-08 ENCOUNTER — TELEPHONE (OUTPATIENT)
Dept: DIABETES | Facility: CLINIC | Age: 65
End: 2025-05-08
Payer: MEDICARE

## 2025-05-08 ENCOUNTER — HOSPITAL ENCOUNTER (OUTPATIENT)
Dept: RADIOLOGY | Facility: HOSPITAL | Age: 65
Discharge: HOME | End: 2025-05-08
Attending: ORTHOPAEDIC SURGERY
Payer: MEDICARE

## 2025-05-08 ENCOUNTER — TELEPHONE (OUTPATIENT)
Dept: PRIMARY CARE CLINIC | Facility: CLINIC | Age: 65
End: 2025-05-08
Payer: MEDICARE

## 2025-05-08 ENCOUNTER — CLINICAL SUPPORT (OUTPATIENT)
Dept: DIABETES | Facility: CLINIC | Age: 65
End: 2025-05-08
Payer: MEDICARE

## 2025-05-08 ENCOUNTER — OFFICE VISIT (OUTPATIENT)
Dept: ORTHOPEDICS | Facility: CLINIC | Age: 65
End: 2025-05-08
Payer: MEDICARE

## 2025-05-08 VITALS — HEIGHT: 69 IN | WEIGHT: 273.56 LBS | BODY MASS INDEX: 40.52 KG/M2

## 2025-05-08 DIAGNOSIS — M17.11 PRIMARY OSTEOARTHRITIS OF RIGHT KNEE: ICD-10-CM

## 2025-05-08 DIAGNOSIS — Z79.4 TYPE 2 DIABETES MELLITUS WITH HYPERGLYCEMIA, WITH LONG-TERM CURRENT USE OF INSULIN: Primary | ICD-10-CM

## 2025-05-08 DIAGNOSIS — E11.65 TYPE 2 DIABETES MELLITUS WITH HYPERGLYCEMIA, WITH LONG-TERM CURRENT USE OF INSULIN: Primary | ICD-10-CM

## 2025-05-08 DIAGNOSIS — M17.11 PRIMARY OSTEOARTHRITIS OF RIGHT KNEE: Primary | ICD-10-CM

## 2025-05-08 PROCEDURE — 73564 X-RAY EXAM KNEE 4 OR MORE: CPT | Mod: 26,50,, | Performed by: RADIOLOGY

## 2025-05-08 PROCEDURE — 1160F RVW MEDS BY RX/DR IN RCRD: CPT | Mod: CPTII,S$GLB,, | Performed by: ORTHOPAEDIC SURGERY

## 2025-05-08 PROCEDURE — 1125F AMNT PAIN NOTED PAIN PRSNT: CPT | Mod: CPTII,S$GLB,, | Performed by: ORTHOPAEDIC SURGERY

## 2025-05-08 PROCEDURE — 99214 OFFICE O/P EST MOD 30 MIN: CPT | Mod: 57,S$GLB,, | Performed by: ORTHOPAEDIC SURGERY

## 2025-05-08 PROCEDURE — 4010F ACE/ARB THERAPY RXD/TAKEN: CPT | Mod: CPTII,S$GLB,, | Performed by: ORTHOPAEDIC SURGERY

## 2025-05-08 PROCEDURE — 99999 PR PBB SHADOW E&M-EST. PATIENT-LVL II: CPT | Mod: PBBFAC,,, | Performed by: ORTHOPAEDIC SURGERY

## 2025-05-08 PROCEDURE — 3008F BODY MASS INDEX DOCD: CPT | Mod: CPTII,S$GLB,, | Performed by: ORTHOPAEDIC SURGERY

## 2025-05-08 PROCEDURE — 3044F HG A1C LEVEL LT 7.0%: CPT | Mod: CPTII,S$GLB,, | Performed by: ORTHOPAEDIC SURGERY

## 2025-05-08 PROCEDURE — 3060F POS MICROALBUMINURIA REV: CPT | Mod: CPTII,S$GLB,, | Performed by: ORTHOPAEDIC SURGERY

## 2025-05-08 PROCEDURE — 3066F NEPHROPATHY DOC TX: CPT | Mod: CPTII,S$GLB,, | Performed by: ORTHOPAEDIC SURGERY

## 2025-05-08 PROCEDURE — 73564 X-RAY EXAM KNEE 4 OR MORE: CPT | Mod: TC,50,PO

## 2025-05-08 PROCEDURE — 1159F MED LIST DOCD IN RCRD: CPT | Mod: CPTII,S$GLB,, | Performed by: ORTHOPAEDIC SURGERY

## 2025-05-08 NOTE — PROGRESS NOTES
Past Medical History:   Diagnosis Date    Anxiety     Diabetes mellitus, type 2     Hyperlipidemia     Hypertension        Past Surgical History:   Procedure Laterality Date    KNEE ARTHROPLASTY         Current Outpatient Medications   Medication Sig    aspirin 81 MG Chew Take 81 mg by mouth every other day.    blood sugar diagnostic Strp To check BG 1 times daily, to use with insurance preferred meter    blood-glucose meter kit To check BG 1 times daily, to use with insurance preferred meter    FARXIGA 10 mg tablet Take 1 tablet (10 mg total) by mouth once daily. Brand name farxiga    finasteride (PROSCAR) 5 mg tablet Take 1 tablet (5 mg total) by mouth once daily.    glipiZIDE (GLUCOTROL) 2.5 MG TR24 Take 1-2 tablets (2.5-5 mg total) by mouth daily with breakfast.    lancets Misc To check BG 1 times daily, to use with insurance preferred meter    LORazepam (ATIVAN) 1 MG tablet Take 1 tablet (1 mg total) by mouth daily as needed for Anxiety.    losartan (COZAAR) 100 MG tablet Take 1 tablet (100 mg total) by mouth once daily.    omega 3-dha-epa-fish oil (FISH OIL) 1,000 mg (120 mg-180 mg) Cap Take 1 capsule by mouth 2 (two) times daily.    pravastatin (PRAVACHOL) 40 MG tablet TAKE ONE TABLET BY MOUTH EVERY EVENING    sertraline (ZOLOFT) 100 MG tablet Take 2 tablets (200 mg total) by mouth once daily.     No current facility-administered medications for this visit.       Review of patient's allergies indicates:  No Known Allergies    Family History   Problem Relation Name Age of Onset    Cancer Mother         Social History     Socioeconomic History    Marital status:    Tobacco Use    Smoking status: Never    Smokeless tobacco: Never   Substance and Sexual Activity    Alcohol use: Not Currently    Drug use: Not Currently     Social Drivers of Health     Financial Resource Strain: Medium Risk (5/1/2025)    Overall Financial Resource Strain (CARDIA)     Difficulty of Paying Living Expenses: Somewhat hard   Food  Insecurity: No Food Insecurity (5/1/2025)    Hunger Vital Sign     Worried About Running Out of Food in the Last Year: Never true     Ran Out of Food in the Last Year: Never true   Transportation Needs: No Transportation Needs (5/1/2025)    PRAPARE - Transportation     Lack of Transportation (Medical): No     Lack of Transportation (Non-Medical): No   Physical Activity: Insufficiently Active (5/1/2025)    Exercise Vital Sign     Days of Exercise per Week: 1 day     Minutes of Exercise per Session: 30 min   Stress: No Stress Concern Present (5/1/2025)    [employer] [place] [employer] - Occupational Stress Questionnaire     Feeling of Stress : Only a little   Housing Stability: Low Risk  (5/1/2025)    Housing Stability Vital Sign     Unable to Pay for Housing in the Last Year: No     Number of Times Moved in the Last Year: 0     Homeless in the Last Year: No       Chief Complaint:   No chief complaint on file.      History of present illness:  65-year-old male seen for right knee pain.  Patient has had pain for a few years now.  Patient saw Dr. Hayden and had his knee injected with cortisone.  No real relief.  Pain along the medial aspect of his knee.  Sharp shooting pain down the tibia at times.  Increased pain at night.  Pain when going from sitting to standing.  Describes as a constant aching throbbing pain.  He has had Synvisc as well. Pain is a 2/10.        Review of Systems:    Constitution: Negative for chills, fever, and sweats.  Negative for unexplained weight loss.    HENT:  Negative for headaches and blurry vision.    Cardiovascular:Negative for chest pain or irregular heart beat. Negative for hypertension.    Respiratory:  Negative for cough and shortness of breath.    Gastrointestinal: Negative for abdominal pain, heartburn, melena, nausea, and vomitting.    Genitourinary:  Negative bladder incontinence and dysuria.    Musculoskeletal:  See HPI    Neurological: Negative for  numbness.    Psychiatric/Behavioral: Negative for depression.  The patient is not nervous/anxious.      Endocrine: Negative for polyuria    Hematologic/Lymphatic: Negative for bleeding problem.  Does not bruise/bleed easily.    Skin: Negative for poor would healing and rash      Physical Examination:    Vital Signs:    There were no vitals filed for this visit.      There is no height or weight on file to calculate BMI.    This a well-developed, well nourished patient in no acute distress.  They are alert and oriented and cooperative to examination.  Pt. walks without an antalgic gait.      Examination of the right knee shows no rashes or erythema. There are no masses ecchymosis or effusion. Patient has full range of motion from 0-130°. Patient is nontender to palpation over lateral joint line and Moderately tender to palpation over the medial joint line. Patient has a - Lachman exam, - anterior drawer exam, and - posterior drawer exam. - Apley exam. Knee is stable to varus and valgus stress. 5 out of 5 motor strength. Palpable distal pulses. Intact light touch sensation. Negative Patellofemoral crepitus    Heart is regular rate without obvious murmurs   Normal respiratory effort without audible wheezing  Abdomen is soft and nontender       X-rays:  X-ray of both knees are ordered review which show moderate to severe medial joint space narrowing of the right knee         Assessment:  Right knee osteoarthritis    Plan:  I reviewed the findings with him today.  We talked about treatment options.  Patient has tried and failed conservative measures for years.  We talked in detail about right robotic assisted total knee arthroplasty.  Plan is for right James Chandana triathlon total knee arthroplasty using cement.  Risks, benefits, and alternatives to the procedure were explained to the patient including but not limited to damage to nerves, arteries, blood vessels, bones, tendons, ligaments, stiffness, instability,  infection, permanent limb dysfunction, DVT, PE, as well as general anesthetic complications including seizure, stroke, heart attack and even death. The patient understood these risks and wished to proceed and signed the informed consent.       The mobility limitation can not be sufficiently resolved by the use of a cane.  Patient's functional mobility deficit can be sufficiently resolved with the use of a rolling walker or a walker.  Patient has mobility limitation significantly impairs their ability to participate in 1 or more activities of daily living.  The use of a walker or rolling walker we will significantly improve the patient's ability to participate in activities of daily living and the patient will use it on a regular basis in the home.                All previous pertinent notes including ER visits, physical therapy visits, other orthopedic visits as well as other care for the same musculoskeletal problem were reviewed.  All pertinent lab values and previous imaging was reviewed pertinent to the current visit.    This note was created using [employer] voice recognition software that occasionally misinterpreted phrases or words.    Consult note is delivered via Epic messaging service.

## 2025-05-08 NOTE — TELEPHONE ENCOUNTER
----- Message from Tech Yousuf sent at 5/8/2025  9:23 AM CDT -----  Regarding: pre op clearance  Patient was seen today by Dr. Ivey and consented for right total knee arthroplasty.  Please advise on pre op medical clearance for this patient.  Once clearance has been given we will contact the patient to schedule the surgery. Asa

## 2025-05-13 ENCOUNTER — OFFICE VISIT (OUTPATIENT)
Dept: PRIMARY CARE CLINIC | Facility: CLINIC | Age: 65
End: 2025-05-13
Payer: MEDICARE

## 2025-05-13 VITALS
RESPIRATION RATE: 17 BRPM | HEIGHT: 69 IN | BODY MASS INDEX: 39.56 KG/M2 | SYSTOLIC BLOOD PRESSURE: 130 MMHG | TEMPERATURE: 98 F | WEIGHT: 267.06 LBS | HEART RATE: 78 BPM | OXYGEN SATURATION: 97 % | DIASTOLIC BLOOD PRESSURE: 74 MMHG

## 2025-05-13 DIAGNOSIS — Z01.818 PREOPERATIVE EXAMINATION: Primary | ICD-10-CM

## 2025-05-13 DIAGNOSIS — M17.11 PRIMARY OSTEOARTHRITIS OF RIGHT KNEE: ICD-10-CM

## 2025-05-13 DIAGNOSIS — E11.42 TYPE 2 DIABETES MELLITUS WITH DIABETIC POLYNEUROPATHY, WITHOUT LONG-TERM CURRENT USE OF INSULIN: ICD-10-CM

## 2025-05-13 DIAGNOSIS — I10 ESSENTIAL HYPERTENSION, BENIGN: ICD-10-CM

## 2025-05-13 PROCEDURE — 1160F RVW MEDS BY RX/DR IN RCRD: CPT | Mod: CPTII,S$GLB,, | Performed by: FAMILY MEDICINE

## 2025-05-13 PROCEDURE — 99999 PR PBB SHADOW E&M-EST. PATIENT-LVL IV: CPT | Mod: PBBFAC,,, | Performed by: FAMILY MEDICINE

## 2025-05-13 PROCEDURE — 3008F BODY MASS INDEX DOCD: CPT | Mod: CPTII,S$GLB,, | Performed by: FAMILY MEDICINE

## 2025-05-13 PROCEDURE — 3060F POS MICROALBUMINURIA REV: CPT | Mod: CPTII,S$GLB,, | Performed by: FAMILY MEDICINE

## 2025-05-13 PROCEDURE — 4010F ACE/ARB THERAPY RXD/TAKEN: CPT | Mod: CPTII,S$GLB,, | Performed by: FAMILY MEDICINE

## 2025-05-13 PROCEDURE — 1101F PT FALLS ASSESS-DOCD LE1/YR: CPT | Mod: CPTII,S$GLB,, | Performed by: FAMILY MEDICINE

## 2025-05-13 PROCEDURE — 3078F DIAST BP <80 MM HG: CPT | Mod: CPTII,S$GLB,, | Performed by: FAMILY MEDICINE

## 2025-05-13 PROCEDURE — 1159F MED LIST DOCD IN RCRD: CPT | Mod: CPTII,S$GLB,, | Performed by: FAMILY MEDICINE

## 2025-05-13 PROCEDURE — 1125F AMNT PAIN NOTED PAIN PRSNT: CPT | Mod: CPTII,S$GLB,, | Performed by: FAMILY MEDICINE

## 2025-05-13 PROCEDURE — 3044F HG A1C LEVEL LT 7.0%: CPT | Mod: CPTII,S$GLB,, | Performed by: FAMILY MEDICINE

## 2025-05-13 PROCEDURE — 99214 OFFICE O/P EST MOD 30 MIN: CPT | Mod: S$GLB,,, | Performed by: FAMILY MEDICINE

## 2025-05-13 PROCEDURE — 3066F NEPHROPATHY DOC TX: CPT | Mod: CPTII,S$GLB,, | Performed by: FAMILY MEDICINE

## 2025-05-13 PROCEDURE — 3075F SYST BP GE 130 - 139MM HG: CPT | Mod: CPTII,S$GLB,, | Performed by: FAMILY MEDICINE

## 2025-05-13 PROCEDURE — 3288F FALL RISK ASSESSMENT DOCD: CPT | Mod: CPTII,S$GLB,, | Performed by: FAMILY MEDICINE

## 2025-05-13 NOTE — PROGRESS NOTES
Assessment:       1. Preoperative examination    2. Primary osteoarthritis of right knee    3. Type 2 diabetes mellitus with diabetic polyneuropathy, without long-term current use of insulin    4. Essential hypertension, benign         Plan:       Preoperative examination  -     EKG 12-lead    Primary osteoarthritis of right knee    Type 2 diabetes mellitus with diabetic polyneuropathy, without long-term current use of insulin    Essential hypertension, benign      Assessment & Plan    - Cleared for upcoming right knee arthroplasty under general anesthesia.  - EKG shows normal sinus rhythm with RBBB, no change from 2021 study.  - Recent bloodwork indicates good renal function and A1C of 6.7.  - BP well-controlled.  - Diabetes managed without injectable medications.    RIGHT KNEE OSTEOARTHRITIS:   Patient is scheduled for right knee replacement surgery with Dr. Ivey due to osteoarthritis.   Medical clearance has been provided for knee arthroplasty under general anesthesia.    TYPE 2 DIABETES MELLITUS:   Diabetes is well controlled with A1C of 6.7.   Patient is not on any injectable antidiabetic medications.    HYPERTENSION:   Blood pressure has been well controlled.    PRE-OPERATIVE EVALUATION:   Patient reports no recent illness or injury, no open sores or wounds, no fevers or chills, chest pain, palpitations, dizziness, lightheadedness, vomiting, diarrhea, or unexplained bruising.   EKG performed in office.       Medication List with Changes/Refills   Current Medications    ASPIRIN 81 MG CHEW    Take 81 mg by mouth every other day.    BLOOD SUGAR DIAGNOSTIC STRP    To check BG 1 times daily, to use with insurance preferred meter    BLOOD-GLUCOSE METER KIT    To check BG 1 times daily, to use with insurance preferred meter    FARXIGA 10 MG TABLET    Take 1 tablet (10 mg total) by mouth once daily. Brand name farxiga    FINASTERIDE (PROSCAR) 5 MG TABLET    Take 1 tablet (5 mg total) by mouth once daily.     GLIPIZIDE (GLUCOTROL) 2.5 MG TR24    Take 1-2 tablets (2.5-5 mg total) by mouth daily with breakfast.    LANCETS MISC    To check BG 1 times daily, to use with insurance preferred meter    LORAZEPAM (ATIVAN) 1 MG TABLET    Take 1 tablet (1 mg total) by mouth daily as needed for Anxiety.    LOSARTAN (COZAAR) 100 MG TABLET    Take 1 tablet (100 mg total) by mouth once daily.    OMEGA 3-DHA-EPA-FISH OIL (FISH OIL) 1,000 MG (120 MG-180 MG) CAP    Take 1 capsule by mouth 2 (two) times daily.    PRAVASTATIN (PRAVACHOL) 40 MG TABLET    TAKE ONE TABLET BY MOUTH EVERY EVENING    SERTRALINE (ZOLOFT) 100 MG TABLET    Take 2 tablets (200 mg total) by mouth once daily.         Subjective:    Patient ID: Yosef Giordano Jr. is a 65 y.o. male.  Chief Complaint: Medical clearance.  (Knee replacement (right). )    HPI  History of Present Illness    CHIEF COMPLAINT:  Patient presents today for pre-operative clearance for right knee replacement surgery.    MEDICAL HISTORY:  He has diabetes which is well controlled without insulin therapy. His blood pressure is also well controlled.    SURGICAL HISTORY:  He underwent torn meniscus repair approximately 45 years ago without complications. He is scheduled for right knee replacement surgery with Dr. Ivey, pending medical clearance.    REVIEW OF SYSTEMS:  He denies recent illness, injury, open sores, wounds, fevers, chills, chest pain, palpitations, dizziness, lightheadedness, vomiting, diarrhea, unexplained bruising, and swelling in feet or ankles.      ROS:  Constitutional: -fevers, -chills, -dizziness, -lightheadedness  Cardiovascular: -chest pain, -palpitations, -lower extremity edema  Gastrointestinal: -vomiting, -diarrhea  Integumentary: -wound  Hematologic/Lymphatic: -easy bruising       Review of Systems    Objective:      Vitals:    05/13/25 0942   BP: 130/74   BP Location: Left arm   Patient Position: Sitting   Pulse: 78   Resp: 17   Temp: 97.7 °F (36.5 °C)   TempSrc: Temporal  "  SpO2: 97%   Weight: 121.2 kg (267 lb 1.4 oz)   Height: 5' 9" (1.753 m)     BP Readings from Last 5 Encounters:   05/13/25 130/74   05/05/25 134/78   04/28/25 124/78   08/14/24 118/74   07/10/24 138/82     Wt Readings from Last 5 Encounters:   05/13/25 121.2 kg (267 lb 1.4 oz)   05/08/25 124.1 kg (273 lb 9.5 oz)   05/05/25 124.1 kg (273 lb 9.5 oz)   04/28/25 123.6 kg (272 lb 8 oz)   08/14/24 122 kg (268 lb 15.4 oz)     Physical Exam  Physical Exam    General: Well-developed. Well-nourished. No acute distress.  Eyes: EOMI. Sclerae anicteric.  HENT: Normocephalic. Atraumatic. Nares patent. Moist oral mucosa.  Cardiovascular: Regular rate. Regular rhythm. No murmurs. No rubs. No gallops. Normal S1, S2.  Respiratory: Normal respiratory effort. Clear to auscultation bilaterally. No rales. No rhonchi. No wheezing.  Musculoskeletal: No  obvious deformity.  Extremities: No lower extremity edema.  Neurological: Alert & oriented x3. No slurred speech. Normal gait.  Psychiatric: Normal mood. Normal affect. Good insight. Good judgment.  Skin: Warm. Dry. No rash.         Lab Results   Component Value Date    WBC 8.01 07/09/2024    HGB 15.8 07/09/2024    HCT 45.1 07/09/2024     07/09/2024    CHOL 126 01/03/2025    TRIG 161 (H) 01/03/2025    HDL 37 (L) 01/03/2025    ALT 25 01/03/2025    AST 22 01/03/2025     04/28/2025    K 4.0 04/28/2025     04/28/2025    CREATININE 0.9 04/28/2025    BUN 17 04/28/2025    CO2 21 (L) 04/28/2025    TSH 3.643 01/03/2025    PSA 1.47 04/28/2025    HGBA1C 6.7 (H) 04/28/2025      This note was generated with the assistance of ambient listening technology. Verbal consent was obtained by the patient and accompanying visitor(s) for the recording of patient appointment to facilitate this note. I attest to having reviewed and edited the generated note for accuracy, though some syntax or spelling errors may persist. Please contact the author of this note for any clarification.      "

## 2025-05-14 ENCOUNTER — PATIENT MESSAGE (OUTPATIENT)
Dept: ORTHOPEDICS | Facility: CLINIC | Age: 65
End: 2025-05-14
Payer: MEDICARE

## 2025-05-15 ENCOUNTER — TELEPHONE (OUTPATIENT)
Dept: ORTHOPEDICS | Facility: CLINIC | Age: 65
End: 2025-05-15
Payer: MEDICARE

## 2025-05-15 ENCOUNTER — PATIENT MESSAGE (OUTPATIENT)
Dept: ORTHOPEDICS | Facility: CLINIC | Age: 65
End: 2025-05-15
Payer: MEDICARE

## 2025-05-15 DIAGNOSIS — M17.11 PRIMARY OSTEOARTHRITIS OF RIGHT KNEE: Primary | ICD-10-CM

## 2025-05-15 NOTE — TELEPHONE ENCOUNTER
----- Message from Nurse Sanchez sent at 5/14/2025 10:42 AM CDT -----  Contact: pt    ----- Message -----  From: Meghann Leonard MA  Sent: 5/14/2025  10:03 AM CDT  To: Uche Hernandez Staff    Surgical clearance should have been sent yesterday Call back  [phone]

## 2025-05-16 DIAGNOSIS — M17.11 PRIMARY OSTEOARTHRITIS OF RIGHT KNEE: Primary | ICD-10-CM

## 2025-05-16 DIAGNOSIS — Z01.818 PRE-OP TESTING: ICD-10-CM

## 2025-05-16 RX ORDER — MUPIROCIN 20 MG/G
OINTMENT TOPICAL
OUTPATIENT
Start: 2025-05-16

## 2025-06-11 ENCOUNTER — HOSPITAL ENCOUNTER (OUTPATIENT)
Dept: RADIOLOGY | Facility: HOSPITAL | Age: 65
Discharge: HOME | End: 2025-06-11
Attending: ORTHOPAEDIC SURGERY
Payer: MEDICARE

## 2025-06-11 ENCOUNTER — HOSPITAL ENCOUNTER (OUTPATIENT)
Dept: PREADMISSION TESTING | Facility: HOSPITAL | Age: 65
Discharge: HOME | End: 2025-06-11
Attending: ORTHOPAEDIC SURGERY
Payer: MEDICARE

## 2025-06-11 VITALS — BODY MASS INDEX: 38.66 KG/M2 | WEIGHT: 261 LBS | HEIGHT: 69 IN

## 2025-06-11 DIAGNOSIS — M17.11 PRIMARY OSTEOARTHRITIS OF RIGHT KNEE: ICD-10-CM

## 2025-06-11 DIAGNOSIS — M17.11 PRIMARY OSTEOARTHRITIS OF RIGHT KNEE: Primary | ICD-10-CM

## 2025-06-11 DIAGNOSIS — Z01.818 PRE-OP TESTING: ICD-10-CM

## 2025-06-11 DIAGNOSIS — Z01.818 PREOP TESTING: Primary | ICD-10-CM

## 2025-06-11 LAB
ABSOLUTE EOSINOPHIL (SMH): 0.18 K/UL
ABSOLUTE MONOCYTE (SMH): 0.66 K/UL (ref 0.3–1)
ABSOLUTE NEUTROPHIL COUNT (SMH): 4.6 K/UL (ref 1.8–7.7)
ANION GAP (SMH): 12 MMOL/L (ref 8–16)
BASOPHILS # BLD AUTO: 0.11 K/UL
BASOPHILS NFR BLD AUTO: 1.3 %
BUN SERPL-MCNC: 17 MG/DL (ref 8–23)
CALCIUM SERPL-MCNC: 9.5 MG/DL (ref 8.7–10.5)
CHLORIDE SERPL-SCNC: 105 MMOL/L (ref 95–110)
CO2 SERPL-SCNC: 21 MMOL/L (ref 23–29)
CREAT SERPL-MCNC: 0.8 MG/DL (ref 0.5–1.4)
ERYTHROCYTE [DISTWIDTH] IN BLOOD BY AUTOMATED COUNT: 12.6 % (ref 11.5–14.5)
GFR SERPLBLD CREATININE-BSD FMLA CKD-EPI: >60 ML/MIN/1.73/M2
GLUCOSE SERPL-MCNC: 117 MG/DL (ref 70–110)
HCT VFR BLD AUTO: 42.8 % (ref 40–54)
HGB BLD-MCNC: 14.9 GM/DL (ref 14–18)
IMM GRANULOCYTES # BLD AUTO: 0.06 K/UL (ref 0–0.04)
IMM GRANULOCYTES NFR BLD AUTO: 0.7 % (ref 0–0.5)
LYMPHOCYTES # BLD AUTO: 2.6 K/UL (ref 1–4.8)
MCH RBC QN AUTO: 28.7 PG (ref 27–31)
MCHC RBC AUTO-ENTMCNC: 34.8 G/DL (ref 32–36)
MCV RBC AUTO: 83 FL (ref 82–98)
MRSA PCR SCRN (SMH): NOT DETECTED
NUCLEATED RBC (/100WBC) (SMH): 0 /100 WBC
PLATELET # BLD AUTO: 175 K/UL (ref 150–450)
PMV BLD AUTO: 9.1 FL (ref 9.2–12.9)
POTASSIUM SERPL-SCNC: 3.6 MMOL/L (ref 3.5–5.1)
RBC # BLD AUTO: 5.19 M/UL (ref 4.6–6.2)
RELATIVE EOSINOPHIL (SMH): 2.2 % (ref 0–8)
RELATIVE LYMPHOCYTE (SMH): 31.7 % (ref 18–48)
RELATIVE MONOCYTE (SMH): 8 % (ref 4–15)
RELATIVE NEUTROPHIL (SMH): 56.1 % (ref 38–73)
SODIUM SERPL-SCNC: 138 MMOL/L (ref 136–145)
WBC # BLD AUTO: 8.21 K/UL (ref 3.9–12.7)

## 2025-06-11 PROCEDURE — 82374 ASSAY BLOOD CARBON DIOXIDE: CPT | Performed by: ORTHOPAEDIC SURGERY

## 2025-06-11 PROCEDURE — 73700 CT LOWER EXTREMITY W/O DYE: CPT | Mod: 26,RT,, | Performed by: RADIOLOGY

## 2025-06-11 PROCEDURE — 85025 COMPLETE CBC W/AUTO DIFF WBC: CPT | Performed by: ORTHOPAEDIC SURGERY

## 2025-06-11 PROCEDURE — 73700 CT LOWER EXTREMITY W/O DYE: CPT | Mod: TC,RT

## 2025-06-11 PROCEDURE — 36415 COLL VENOUS BLD VENIPUNCTURE: CPT | Performed by: ORTHOPAEDIC SURGERY

## 2025-06-11 PROCEDURE — 87641 MR-STAPH DNA AMP PROBE: CPT | Performed by: ORTHOPAEDIC SURGERY

## 2025-06-11 RX ORDER — VIT C/ZN GLUC/HERBAL NO.325 90 MG-15MG
LOZENGE MUCOUS MEMBRANE
COMMUNITY

## 2025-06-11 RX ORDER — MULTIVITAMIN
TABLET ORAL
COMMUNITY
End: 2025-06-11 | Stop reason: CLARIF

## 2025-06-11 RX ORDER — MV/FA/DHA/EPA/FISH OIL/SAW/GNK 400MCG-200
COMBINATION PACKAGE (EA) ORAL
COMMUNITY

## 2025-06-11 RX ORDER — PNV NO.95/FERROUS FUM/FOLIC AC 28MG-0.8MG
TABLET ORAL
COMMUNITY

## 2025-06-11 NOTE — PRE ADMISSION SCREENING
JOINT CAMP ASSESSMENT    Name Yosef Giordano Jr.   MRN [id number]    Age/Sex 65 y.o. male    Surgeon Dr. Lucio Ivey   Joint Camp Date 6/11/2025   Surgery Date 6/24/2025   Procedure Right Knee Arthroplasty   Insurance Payor: [employer] [facility] / Plan: [employer] CHOICES / Product Type: Medicare Advantage /    Care Team Patient Care Team:  Sami Hernadez MD as PCP - General (Family Medicine)  Geovani Mancia MD as Consulting Physician (Cardiology)  Linnea Sifuentes RD, ANASTASIYA as Dietitian (Diabetes)  Stephane Melendrez OD (Optometry)  Martine Doty NP as Nurse Practitioner (Endocrinology)  Lucio Ivey MD as Consulting Physician (Sports Medicine)    Pharmacy   [facility] - [place], [place] - [address]  [address] [place] [address]  Phone: [phone] Fax: [phone]     AM-PAC Score   23   Risk Assessment Score 5     Past Medical History:   Diagnosis Date    Anxiety     Diabetes mellitus, type 2     Hyperlipidemia     Hypertension     Sleep apnea        Past Surgical History:   Procedure Laterality Date    COLONSCOPY      KNEE ARTHROPLASTY           Home Enviroment     Living Arrangement: Lives with spouse  Home Environment: 2-story house, number of inside stairs: 14, bedroom on 1st floor, bathroom on 1st floor, walk-in shower  Home Safety Concerns: Pets in the home: dogs (3) and birds (1).    DISCHARGE CAREGIVER/SUPPORT SYSTEM     Identified post-op caregiver: Patient has spouse / significant other.  Patient's caregiver(s) will be able to provide physical assistance. Patient will have someone to assist overnight.      Caregiver present at pre-op interview:  Yes      PRE-OPERATIVE FUNCTIONAL STATUS     Employment: Retired    Pre-op Functional Status: Patient is independent with mobility/ambulation, transfers, ADL's, IADL's.    Use of assistive device for ambulation: none  ADL: self care  ADL Limitations: difficulty with walking  Medical  Restrictions: Unstable ambulation and Decreased range of motions in extremities    POTENTIAL BARRIERS TO DISCHARGE/POTENTIAL POST-OP COMPLICATIONS     Patient with hx of type II diabetes, HTN. POSSIBLE SAME DAY DISCHARGE.    DISCHARGE PLAN     Expected LOS of 1 days or less for joint replacement discussed with patient.     Patient in agreement with discharge plan: Yes    Discharge to: Outpatient PT and Home with 24 hour assistance     HH:  None      OP PT: [facility] (LULA Bhatia)     Home DME: single point cane and bedside commode    Needed DME at D/C: rolling walker     Rx: Per Dr. Ivey at discharge     Meds to Beds: Yes  Patient expected to discharge on Aspirin 81mg by mouth twice daily for DVT prophylaxis.

## 2025-06-11 NOTE — DISCHARGE INSTRUCTIONS
To confirm, Your doctor has instructed you that surgery is scheduled for: 6/24/25 with DR. BANKS    Please report to [facility], Registration the morning of surgery. You must check-in and receive a wristband before going to your procedure.  [address] [place] [address] [place] [address]    Pre-Op will call the afternoon prior to surgery between 1:00 and 6:00 PM with the final arrival time.  Phone number: [phone]    PLEASE NOTE:  The surgery schedule has many variables which may affect the time of your surgery case.  Family members should be available if your surgery time changes.  Plan to be here the day of your procedure between 4-6 hours.    MEDICATIONS:  TAKE ONLY THESE MEDICATIONS WITH A SMALL SIP OF WATER THE MORNING OF YOUR PROCEDURE:  SEE MED LIST      DO NOT TAKE THESE MEDICATIONS 5-7 DAYS PRIOR to your procedure or per your surgeon's request:   ASPIRIN, ALEVE, ADVIL, IBUPROFEN, FISH OIL VITAMIN E, HERBALS  (May take Tylenol)    ONLY if you are prescribed any types of blood thinners such as:  Aspirin, Coumadin, Plavix, Pradaxa, Xarelto, Aggrenox, Effient, Eliquis, Savasya, Brilinta, or any other, ask your surgeon whether you should stop taking them and how long before surgery you should stop.  You may also need to verify with the prescribing physician if it is ok to stop your medication.      INSTRUCTIONS IMPORTANT!!  Do not eat or drink anything between midnight and the time of your procedure- this includes gum, mints, and candy.  EXCEPT: you may have clear liquids such as:  WATER, BLACK COFFEE, UNSWEET TEA, OR GATORADE (NO RED OR PURPLE) UP TO 2 HOURS PRIOR TO YOUR ARRIVAL TIME.  Do not smoke or drink alcoholic beverages 24 hours prior to your procedure.  Shower the night before AND the morning of your procedure with a Chlorhexidine wash such as Hibiclens or Dial antibacterial soap from the neck down.  Do not get it on your face or in your eyes.  You may use your own shampoo and face  wash. This helps your skin to be as bacteria free as possible.    If you wear contact lenses, dentures, hearing aids or glasses, bring a container to put them in during surgery and give to a family member for safe keeping.  Please leave all jewelry, piercing's and valuables at home. You must remove your false eyelashes prior to surgery.    DO NOT remove hair from the surgery site.  Do not shave the incision site unless you are given specific instructions to do so.    ONLY if you have been diagnosed with sleep apnea please bring your C-PAP machine.  ONLY if you wear home oxygen please bring your portable oxygen tank the day of your procedure.  ONLY if you have a history of OPEN HEART SURGERY you will need a clearance from your Cardiologist per Anesthesia.      ONLY for patients requiring bowel prep, written instructions will be given by your doctor's office.  ONLY if you have any type of stimulator implant or any type of implanted device with a remote control.  Please bring the controller with you the morning of surgery  If your doctor has scheduled you for an overnight stay, bring a small overnight bag with any personal items you need.  Make arrangements in advance for transportation home by a responsible adult. You can not go home in an uber or a cab per hospital policy.  It is not safe to drive a vehicle during the 24 hours after anesthesia.          All  facilities and properties are tobacco free.  Smoking is NOT allowed.   If you have any questions about these instructions, call Pre-Op Admit  Nursing at [phone] or the Pre-Op Day Surgery Unit at [phone].

## 2025-06-11 NOTE — PRE ADMISSION SCREENING
"               [facility] Risk Assessment Scale    Patient Name: Yosef Giordano Jr.  YOB: 1960  MRN: [id number]            RIsk Factor Measure Recommendation Patient Data Scale/Score   BMI >40 Reconsider surgery, weight loss   Estimated body mass index is 38.54 kg/m² as calculated from the following:    Height as of this encounter: 5' 9" (1.753 m).    Weight as of this encounter: 118.4 kg (261 lb).   [] 0 = 1 - 24.9  [] 1 = 25-29.9  [] 2 = 30-34.9  [x] 3 = 35-39.9  [] 4 = 40-44.9  [] 5 = 45-99.9   Hemoglobin AIC (if applicable) >9 Delay surgery until DM under control  Refer for:  Nutrition Therapy  Exercise   Medication    Lab Results   Component Value Date    HGBA1C 6.7 (H) 04/28/2025       Lab Results   Component Value Date     (H) 06/11/2025      [] 0 = 4.0-5.6  [] 1 = 5.7-6.4  [x] 2 = 6.5-6.9  [] 3 = 7.0-7.9  [] 4 = 8.0-8.9  [] 5 = 9.0-12   Hemoglobin (Anemia) <9 Delay surgery   Correct anemia Lab Results   Component Value Date    HGB 14.9 06/11/2025    [] 20 - <9.0                    Albumin <3 Delay surgery &Workup Lab Results   Component Value Date    ALBUMIN 4.2 01/03/2025    [] 20 - <3.0   Smoking Cessation >4 Weeks Delay Surgery  Refer to OP Cessation Class    Never Smoker [] 20 - current smoker                                _____ PPD                    Hx of MI, PE, Arrhythmia, CVA, DVT <30 Days Delay Surgery    N/A [] 20      Infection Variable Delay surgery and re-evaluate   N/A [] 20 - recent/current infection     Depression (PHQ) >10 out of 27 Delay Surgery and re-evaluate  Medication  Counseling              [x] 0     []1     []2     []3      []4      [] 5                    (1-4)      (5-9)  (10-14)  (15-19)   (20-27)     Memory Impairment & Memory loss (Mini-Cog Screening Tool) Advanced dementia and/or Parkinson's Reconsider surgery     [x] 0     []1     []2     []3     []4     [] 5     Physical Conditioning (Modified AM-PAC Per Physical Therapy at [facility]) Unable to ambulate on " day of surgery Delay surgery and re-evaluate  Pre-Rehabilitation   (PT evaluation)       [x]  0   []4       []8     []12        []16     []20       (<20%)   (<40%)   (<60%)   (<80% )    (>80%)     Home Environment/Caregiver support  (Per /Navigator Interview)    Availability of basic services and/or approprate assistance during post-operative period Delay surgery and re-evaluate  Safe home environment  Health   1 week post-surgery  Transportation  availability  Ability to obtain DME/Medications post-op    [x] 0     []1     []2     []3     []4     [] 5  [x] 0     []1     []2     []3     []4     [] 5  [x] 0     []1     []2     []3     []4     [] 5  [x] 0     []1     []2     []3     []4     [] 5         MD Contact: Dr. Ivey Comments:  Total Score:  5

## 2025-06-11 NOTE — PRE ADMISSION SCREENING
Patient Name: Yosef Giordano Jr.  YOB: 1960   MRN: [id number]     [facility]   Basic Mobility Inpatient Short Form 6 Clicks         How much difficulty does the patient currently have  Unable  A Lot  A Little  None      1. Turning over in bed (including adjusting bedclothes, sheets and blankets)?     1 []    2 []    3 []    4 [x]        2. Sitting down on and standing up from a chair with arms (e.g., wheelchair, bedside commode, etc.)     1 []  2 []  3 []     4 [x]      3. Moving from lying on back to sitting on the side of the bed?     1 []  2 []  3 [x]    4 []    How much help from another person does the patient currently need  Total  A Lot  A Little  None      4. Moving to and from a bed to a chair (including a wheelchair)?    1 []  2 []  3 []    4 [x]      5. Need to walk in hospital room?    1 []  2 []  3 []    4 [x]      6. Climbing 3-5 steps with a railing?    1 []  2 []  3 []    4 [x]       Raw Score:     23             CMS 0-100% Score:   11.20         %   Standardized Score:    56.93           CMS Modifier:     CI                                     [facility]   Basic Mobility Inpatient Short Form 6 Clicks Score Conversion Table*         *Use this form to convert -PAC Basic Mobility Inpatient Raw Scores.   [facility] Inpatient Basic Mobility Short Form Scoring Example   1. Add the number values associated with the response to each item. For example, items totals yield a Raw Score of 21.   2. Match the raw score to the t-Scale scores (t-Scale score = 50.25, SE = 4.69).   3. Find the associated CMS % (CMS % = 28.97%).   4. Locate the correct CMS Functional Modifier Code, or G Code (G code = CJ)     NOTE: Each -PAC Short Form has a separate conversion table. Make sure that you use the correct conversion table.       Instruction Manual - page 45 contains conversion table

## 2025-06-12 DIAGNOSIS — M17.11 PRIMARY OSTEOARTHRITIS OF RIGHT KNEE: Primary | ICD-10-CM

## 2025-06-23 ENCOUNTER — ANESTHESIA EVENT (OUTPATIENT)
Dept: SURGERY | Facility: HOSPITAL | Age: 65
End: 2025-06-23
Payer: MEDICARE

## 2025-06-23 DIAGNOSIS — M17.11 PRIMARY OSTEOARTHRITIS OF RIGHT KNEE: Primary | ICD-10-CM

## 2025-06-23 RX ORDER — ACETAMINOPHEN 500 MG
1000 TABLET ORAL EVERY 8 HOURS PRN
Qty: 30 TABLET | Refills: 0 | Status: SHIPPED | OUTPATIENT
Start: 2025-06-23

## 2025-06-23 RX ORDER — ONDANSETRON 4 MG/1
4 TABLET, FILM COATED ORAL EVERY 6 HOURS PRN
Qty: 30 TABLET | Refills: 0 | Status: SHIPPED | OUTPATIENT
Start: 2025-06-23

## 2025-06-23 RX ORDER — OXYCODONE AND ACETAMINOPHEN 5; 325 MG/1; MG/1
1 TABLET ORAL EVERY 6 HOURS PRN
Qty: 28 TABLET | Refills: 0 | Status: SHIPPED | OUTPATIENT
Start: 2025-06-23

## 2025-06-23 RX ORDER — CYCLOBENZAPRINE HCL 10 MG
10 TABLET ORAL 3 TIMES DAILY PRN
Qty: 30 TABLET | Refills: 0 | Status: SHIPPED | OUTPATIENT
Start: 2025-06-23 | End: 2025-07-03

## 2025-06-23 RX ORDER — ASPIRIN 81 MG/1
81 TABLET ORAL 2 TIMES DAILY
Qty: 56 TABLET | Refills: 0 | Status: SHIPPED | OUTPATIENT
Start: 2025-06-23 | End: 2026-06-23

## 2025-06-23 RX ORDER — IBUPROFEN 600 MG/1
600 TABLET, FILM COATED ORAL EVERY 6 HOURS PRN
Qty: 30 TABLET | Refills: 0 | Status: SHIPPED | OUTPATIENT
Start: 2025-06-23

## 2025-06-24 ENCOUNTER — HOSPITAL ENCOUNTER (OUTPATIENT)
Facility: HOSPITAL | Age: 65
Discharge: HOME | End: 2025-06-24
Attending: ORTHOPAEDIC SURGERY | Admitting: ORTHOPAEDIC SURGERY
Payer: MEDICARE

## 2025-06-24 ENCOUNTER — ANESTHESIA (OUTPATIENT)
Dept: SURGERY | Facility: HOSPITAL | Age: 65
End: 2025-06-24
Payer: MEDICARE

## 2025-06-24 DIAGNOSIS — M17.11 PRIMARY OSTEOARTHRITIS OF RIGHT KNEE: Primary | ICD-10-CM

## 2025-06-24 DIAGNOSIS — Z01.818 PRE-OP TESTING: ICD-10-CM

## 2025-06-24 PROCEDURE — 63600175 PHARM REV CODE 636 W HCPCS: Performed by: NURSE ANESTHETIST, CERTIFIED REGISTERED

## 2025-06-24 PROCEDURE — 27447 TOTAL KNEE ARTHROPLASTY: CPT | Mod: RT,,, | Performed by: ORTHOPAEDIC SURGERY

## 2025-06-24 PROCEDURE — C1769 GUIDE WIRE: HCPCS | Performed by: ORTHOPAEDIC SURGERY

## 2025-06-24 PROCEDURE — 37000009 HC ANESTHESIA EA ADD 15 MINS: Performed by: ORTHOPAEDIC SURGERY

## 2025-06-24 PROCEDURE — C1713 ANCHOR/SCREW BN/BN,TIS/BN: HCPCS | Performed by: ORTHOPAEDIC SURGERY

## 2025-06-24 PROCEDURE — 64447 NJX AA&/STRD FEMORAL NRV IMG: CPT | Performed by: ANESTHESIOLOGY

## 2025-06-24 PROCEDURE — 25000003 PHARM REV CODE 250: Performed by: NURSE ANESTHETIST, CERTIFIED REGISTERED

## 2025-06-24 PROCEDURE — 27200688 HC TRAY, SPINAL-HYPER/ ISOBARIC: Performed by: ANESTHESIOLOGY

## 2025-06-24 PROCEDURE — 71000016 HC POSTOP RECOV ADDL HR: Performed by: ORTHOPAEDIC SURGERY

## 2025-06-24 PROCEDURE — 27201423 OPTIME MED/SURG SUP & DEVICES STERILE SUPPLY: Performed by: ORTHOPAEDIC SURGERY

## 2025-06-24 PROCEDURE — 71000033 HC RECOVERY, INTIAL HOUR: Performed by: ORTHOPAEDIC SURGERY

## 2025-06-24 PROCEDURE — 37000008 HC ANESTHESIA 1ST 15 MINUTES: Performed by: ORTHOPAEDIC SURGERY

## 2025-06-24 PROCEDURE — 64447 NJX AA&/STRD FEMORAL NRV IMG: CPT | Mod: XU,RT,, | Performed by: ANESTHESIOLOGY

## 2025-06-24 PROCEDURE — 63600175 PHARM REV CODE 636 W HCPCS: Performed by: ORTHOPAEDIC SURGERY

## 2025-06-24 PROCEDURE — 27200750 HC INSULATED NEEDLE/ STIMUPLEX: Performed by: ANESTHESIOLOGY

## 2025-06-24 PROCEDURE — 63600175 PHARM REV CODE 636 W HCPCS

## 2025-06-24 PROCEDURE — 97161 PT EVAL LOW COMPLEX 20 MIN: CPT

## 2025-06-24 PROCEDURE — 36000712 HC OR TIME LEV V 1ST 15 MIN: Performed by: ORTHOPAEDIC SURGERY

## 2025-06-24 PROCEDURE — 0055T BONE SRGRY CMPTR CT/MRI IMAG: CPT | Mod: ,,, | Performed by: ORTHOPAEDIC SURGERY

## 2025-06-24 PROCEDURE — 25000003 PHARM REV CODE 250: Performed by: ORTHOPAEDIC SURGERY

## 2025-06-24 PROCEDURE — 63600175 PHARM REV CODE 636 W HCPCS: Performed by: ANESTHESIOLOGY

## 2025-06-24 PROCEDURE — 25000003 PHARM REV CODE 250: Performed by: ANESTHESIOLOGY

## 2025-06-24 PROCEDURE — 71000039 HC RECOVERY, EACH ADD'L HOUR: Performed by: ORTHOPAEDIC SURGERY

## 2025-06-24 PROCEDURE — 97116 GAIT TRAINING THERAPY: CPT

## 2025-06-24 PROCEDURE — C1776 JOINT DEVICE (IMPLANTABLE): HCPCS | Performed by: ORTHOPAEDIC SURGERY

## 2025-06-24 PROCEDURE — 71000015 HC POSTOP RECOV 1ST HR: Performed by: ORTHOPAEDIC SURGERY

## 2025-06-24 PROCEDURE — 36000713 HC OR TIME LEV V EA ADD 15 MIN: Performed by: ORTHOPAEDIC SURGERY

## 2025-06-24 DEVICE — CEMENT BONE ANTIBIO SIMPLEX P: Type: IMPLANTABLE DEVICE | Site: KNEE | Status: FUNCTIONAL

## 2025-06-24 DEVICE — PATELLA
Type: IMPLANTABLE DEVICE | Site: KNEE | Status: FUNCTIONAL
Brand: TRIATHLON

## 2025-06-24 DEVICE — TIBIAL BEARING INSERT
Type: IMPLANTABLE DEVICE | Site: KNEE | Status: FUNCTIONAL
Brand: TRIATHLON

## 2025-06-24 DEVICE — PRIMARY TIBIAL BASEPLATE
Type: IMPLANTABLE DEVICE | Site: KNEE | Status: FUNCTIONAL
Brand: TRIATHLON

## 2025-06-24 DEVICE — CRUCIATE RETAINING FEMORAL
Type: IMPLANTABLE DEVICE | Site: KNEE | Status: FUNCTIONAL
Brand: TRIATHLON

## 2025-06-24 RX ORDER — ONDANSETRON HYDROCHLORIDE 2 MG/ML
INJECTION, SOLUTION INTRAVENOUS
Status: DISCONTINUED | OUTPATIENT
Start: 2025-06-24 | End: 2025-06-24

## 2025-06-24 RX ORDER — LIDOCAINE HYDROCHLORIDE 20 MG/ML
INJECTION INTRAVENOUS
Status: DISCONTINUED | OUTPATIENT
Start: 2025-06-24 | End: 2025-06-24

## 2025-06-24 RX ORDER — DEXAMETHASONE SODIUM PHOSPHATE 4 MG/ML
INJECTION, SOLUTION INTRA-ARTICULAR; INTRALESIONAL; INTRAMUSCULAR; INTRAVENOUS; SOFT TISSUE
Status: DISCONTINUED | OUTPATIENT
Start: 2025-06-24 | End: 2025-06-24

## 2025-06-24 RX ORDER — MIDAZOLAM HYDROCHLORIDE 1 MG/ML
INJECTION INTRAMUSCULAR; INTRAVENOUS
Status: DISCONTINUED | OUTPATIENT
Start: 2025-06-24 | End: 2025-06-24

## 2025-06-24 RX ORDER — DEXMEDETOMIDINE HYDROCHLORIDE 100 UG/ML
INJECTION, SOLUTION INTRAVENOUS
Status: DISCONTINUED | OUTPATIENT
Start: 2025-06-24 | End: 2025-06-24

## 2025-06-24 RX ORDER — CELECOXIB 100 MG/1
400 CAPSULE ORAL ONCE
Status: COMPLETED | OUTPATIENT
Start: 2025-06-24 | End: 2025-06-24

## 2025-06-24 RX ORDER — ACETAMINOPHEN 500 MG
1000 TABLET ORAL
Status: COMPLETED | OUTPATIENT
Start: 2025-06-24 | End: 2025-06-24

## 2025-06-24 RX ORDER — LIDOCAINE HYDROCHLORIDE 10 MG/ML
1 INJECTION, SOLUTION EPIDURAL; INFILTRATION; INTRACAUDAL; PERINEURAL ONCE
Status: DISCONTINUED | OUTPATIENT
Start: 2025-06-24 | End: 2025-06-24 | Stop reason: HOSPADM

## 2025-06-24 RX ORDER — BUPIVACAINE HYDROCHLORIDE 5 MG/ML
INJECTION, SOLUTION EPIDURAL; INTRACAUDAL; PERINEURAL
Status: COMPLETED | OUTPATIENT
Start: 2025-06-24 | End: 2025-06-24

## 2025-06-24 RX ORDER — EPHEDRINE SULFATE 50 MG/ML
INJECTION, SOLUTION INTRAVENOUS
Status: DISCONTINUED | OUTPATIENT
Start: 2025-06-24 | End: 2025-06-24

## 2025-06-24 RX ORDER — BUPIVACAINE 13.3 MG/ML
INJECTION, SUSPENSION, LIPOSOMAL INFILTRATION
Status: COMPLETED | OUTPATIENT
Start: 2025-06-24 | End: 2025-06-24

## 2025-06-24 RX ORDER — KETAMINE HCL IN 0.9 % NACL 50 MG/5 ML
SYRINGE (ML) INTRAVENOUS
Status: DISCONTINUED | OUTPATIENT
Start: 2025-06-24 | End: 2025-06-24

## 2025-06-24 RX ORDER — PROPOFOL 10 MG/ML
VIAL (ML) INTRAVENOUS
Status: DISCONTINUED | OUTPATIENT
Start: 2025-06-24 | End: 2025-06-24

## 2025-06-24 RX ORDER — METOCLOPRAMIDE HYDROCHLORIDE 5 MG/ML
10 INJECTION INTRAMUSCULAR; INTRAVENOUS EVERY 10 MIN PRN
Status: DISCONTINUED | OUTPATIENT
Start: 2025-06-24 | End: 2025-06-24 | Stop reason: HOSPADM

## 2025-06-24 RX ORDER — MUPIROCIN 20 MG/G
OINTMENT TOPICAL
Status: DISCONTINUED | OUTPATIENT
Start: 2025-06-24 | End: 2025-06-24 | Stop reason: HOSPADM

## 2025-06-24 RX ORDER — SODIUM CHLORIDE, SODIUM LACTATE, POTASSIUM CHLORIDE, CALCIUM CHLORIDE 600; 310; 30; 20 MG/100ML; MG/100ML; MG/100ML; MG/100ML
INJECTION, SOLUTION INTRAVENOUS CONTINUOUS
Status: CANCELLED | OUTPATIENT
Start: 2025-06-24

## 2025-06-24 RX ORDER — PHENYLEPHRINE HYDROCHLORIDE 10 MG/ML
INJECTION INTRAVENOUS
Status: DISCONTINUED | OUTPATIENT
Start: 2025-06-24 | End: 2025-06-24

## 2025-06-24 RX ORDER — BUPIVACAINE HYDROCHLORIDE 7.5 MG/ML
INJECTION, SOLUTION EPIDURAL; RETROBULBAR
Status: COMPLETED | OUTPATIENT
Start: 2025-06-24 | End: 2025-06-24

## 2025-06-24 RX ORDER — OXYCODONE HCL 10 MG/1
10 TABLET, FILM COATED, EXTENDED RELEASE ORAL ONCE
Status: COMPLETED | OUTPATIENT
Start: 2025-06-24 | End: 2025-06-24

## 2025-06-24 RX ORDER — FENTANYL CITRATE 50 UG/ML
25 INJECTION, SOLUTION INTRAMUSCULAR; INTRAVENOUS EVERY 5 MIN PRN
Status: DISCONTINUED | OUTPATIENT
Start: 2025-06-24 | End: 2025-06-24 | Stop reason: HOSPADM

## 2025-06-24 RX ORDER — PROPOFOL 10 MG/ML
VIAL (ML) INTRAVENOUS CONTINUOUS PRN
Status: DISCONTINUED | OUTPATIENT
Start: 2025-06-24 | End: 2025-06-24

## 2025-06-24 RX ORDER — OXYCODONE HYDROCHLORIDE 5 MG/1
5 TABLET ORAL
Status: DISCONTINUED | OUTPATIENT
Start: 2025-06-24 | End: 2025-06-24 | Stop reason: HOSPADM

## 2025-06-24 RX ORDER — FENTANYL CITRATE 50 UG/ML
INJECTION, SOLUTION INTRAMUSCULAR; INTRAVENOUS
Status: DISCONTINUED | OUTPATIENT
Start: 2025-06-24 | End: 2025-06-24

## 2025-06-24 RX ADMIN — DEXMEDETOMIDINE HYDROCHLORIDE 10 MCG: 100 INJECTION, SOLUTION INTRAVENOUS at 09:06

## 2025-06-24 RX ADMIN — CELECOXIB 400 MG: 100 CAPSULE ORAL at 07:06

## 2025-06-24 RX ADMIN — FENTANYL CITRATE 50 MCG: 50 INJECTION, SOLUTION INTRAMUSCULAR; INTRAVENOUS at 08:06

## 2025-06-24 RX ADMIN — PHENYLEPHRINE HYDROCHLORIDE 100 MCG: 10 INJECTION INTRAVENOUS at 09:06

## 2025-06-24 RX ADMIN — EPHEDRINE SULFATE 10 MG: 50 INJECTION, SOLUTION INTRAMUSCULAR; INTRAVENOUS; SUBCUTANEOUS at 10:06

## 2025-06-24 RX ADMIN — KETOROLAC TROMETHAMINE: 30 INJECTION, SOLUTION INTRAMUSCULAR; INTRAVENOUS at 08:06

## 2025-06-24 RX ADMIN — BUPIVACAINE HYDROCHLORIDE 10 ML: 5 INJECTION, SOLUTION EPIDURAL; INTRACAUDAL; PERINEURAL at 08:06

## 2025-06-24 RX ADMIN — MIDAZOLAM HYDROCHLORIDE 2 MG: 1 INJECTION, SOLUTION INTRAMUSCULAR; INTRAVENOUS at 08:06

## 2025-06-24 RX ADMIN — PHENYLEPHRINE HYDROCHLORIDE 100 MCG: 10 INJECTION INTRAVENOUS at 10:06

## 2025-06-24 RX ADMIN — PROPOFOL 75 MCG/KG/MIN: 10 INJECTION, EMULSION INTRAVENOUS at 09:06

## 2025-06-24 RX ADMIN — GLYCOPYRROLATE 0.2 MG: 0.2 INJECTION, SOLUTION INTRAMUSCULAR; INTRAVITREAL at 09:06

## 2025-06-24 RX ADMIN — FENTANYL CITRATE 25 MCG: 50 INJECTION, SOLUTION INTRAMUSCULAR; INTRAVENOUS at 09:06

## 2025-06-24 RX ADMIN — SODIUM CHLORIDE, SODIUM GLUCONATE, SODIUM ACETATE, POTASSIUM CHLORIDE AND MAGNESIUM CHLORIDE: 526; 502; 368; 37; 30 INJECTION, SOLUTION INTRAVENOUS at 07:06

## 2025-06-24 RX ADMIN — LIDOCAINE HYDROCHLORIDE 100 MG: 20 INJECTION, SOLUTION INTRAVENOUS at 09:06

## 2025-06-24 RX ADMIN — OXYCODONE HYDROCHLORIDE 10 MG: 10 TABLET, FILM COATED, EXTENDED RELEASE ORAL at 07:06

## 2025-06-24 RX ADMIN — CEFAZOLIN 3 G: 2 INJECTION, POWDER, FOR SOLUTION INTRAMUSCULAR; INTRAVENOUS at 09:06

## 2025-06-24 RX ADMIN — Medication 30 MG: at 09:06

## 2025-06-24 RX ADMIN — TRANEXAMIC ACID 1000 MG: 100 INJECTION, SOLUTION INTRAVENOUS at 10:06

## 2025-06-24 RX ADMIN — BUPIVACAINE HYDROCHLORIDE 1.4 ML: 7.5 INJECTION, SOLUTION EPIDURAL; RETROBULBAR at 09:06

## 2025-06-24 RX ADMIN — DEXAMETHASONE SODIUM PHOSPHATE 4 MG: 4 INJECTION, SOLUTION INTRA-ARTICULAR; INTRALESIONAL; INTRAMUSCULAR; INTRAVENOUS; SOFT TISSUE at 09:06

## 2025-06-24 RX ADMIN — BUPIVACAINE 20 ML: 13.3 INJECTION, SUSPENSION, LIPOSOMAL INFILTRATION at 08:06

## 2025-06-24 RX ADMIN — OXYCODONE HYDROCHLORIDE 5 MG: 5 TABLET ORAL at 11:06

## 2025-06-24 RX ADMIN — ONDANSETRON 8 MG: 2 INJECTION INTRAMUSCULAR; INTRAVENOUS at 09:06

## 2025-06-24 RX ADMIN — ACETAMINOPHEN 1000 MG: 500 TABLET ORAL at 07:06

## 2025-06-24 RX ADMIN — PHENYLEPHRINE HYDROCHLORIDE 200 MCG: 10 INJECTION INTRAVENOUS at 10:06

## 2025-06-24 RX ADMIN — TRANEXAMIC ACID 1000 MG: 100 INJECTION, SOLUTION INTRAVENOUS at 09:06

## 2025-06-24 RX ADMIN — PROPOFOL 30 MG: 10 INJECTION, EMULSION INTRAVENOUS at 09:06

## 2025-06-24 NOTE — PLAN OF CARE
Reviewed discharge instructions, patient states understanding, Educated patient when to notify MD, and post anesthesia precautions, reviewed medications administration with patient, RX to bed, IV removed as ordered, patient voiding without difficulty, post op appointment scheduled, no signs of distress at this time, patient states I'm ready for discharge.

## 2025-06-24 NOTE — PT/OT/SLP EVAL
Physical Therapy Evaluation and Discharge Note    Patient Name:  Yosef Giordano Jr.   MRN:  [id number]    Recommendations:     Discharge Recommendations: Low Intensity Therapy  Discharge Equipment Recommendations: none   Barriers to discharge: None    Assessment:     Yosef Giordano Jr. is a 65 y.o. male admitted with a medical diagnosis of Right TKA. At this time, patient is scheduled to discharge and appears to be safe with mobility. He was able to ambulate x150ft, x200ft with CGA and RW. He also ascended/descended a threshold step safely with a RW to insure he was equipped to get in his home. The patient will benefit from continued PT services with HHPT to increase strength and range of motion for more safety with mobility.    Recent Surgery: Procedure(s) (LRB):  ROBOTIC ARTHROPLASTY, KNEE, TOTAL (Right) * Day of Surgery *    Plan:     During this hospitalization, patient does not require further acute PT services.  Please re-consult if situation changes.      Subjective     Chief Complaint: none  Patient/Family Comments/goals: go home  Pain/Comfort:  Pain Rating 1: 0/10    Patients cultural, spiritual, Yazidi conflicts given the current situation:      Living Environment:  Lives on the 1st level of a 2 story home.  Prior to admission, patients level of function was independent.  Equipment used at home: walker, rolling, cane, straight, bedside commode.  DME owned (not currently used): none.  Upon discharge, patient will have assistance from spouse.    Objective:     Communicated with nurse prior to session.  Patient found supine with cryotherapy, peripheral IV upon PT entry to room.    General Precautions: Standard, fall    Orthopedic Precautions:RLE weight bearing as tolerated   Braces: N/A  Respiratory Status: Room air    Exams:  RLE ROM: -5 extension -100 flexion AROM  RLE Strength: WFL  LLE ROM: WFL  LLE Strength: WFL    Functional Mobility:  Bed Mobility:     Supine to Sit: independence  Transfers:  Sit  to Stand:  contact guard assistance with rolling walker  Bed to Chair: contact guard assistance with  rolling walker  using  Step Transfer  Gait: x150ft, x200ft CGA with RW  Stairs:  Pt ascended/descended 1 threshold step with Rolling Walker with no handrails with Stand-by Assistance.     AM-PAC 6 CLICK MOBILITY  Total Score:24       Treatment and Education:  Educated on importance of early mobilization.  Gait training: x150ft, x200ft CGA with RW. Decreased step length. Continuous step through gait pattern.  Therapeutic exercises: ankle pumps, quad sets, aarom heel slides, aarom hip abductions, LAQs X10 with verbal instructions.    AM-PAC 6 CLICK MOBILITY  Total Score:24     Patient left up in chair with call button in reach and nurse notified.    GOALS:   Multidisciplinary Problems       Physical Therapy Goals       Not on file                    DME Justifications:  No DME recommended requiring DME justifications    History:     Past Medical History:   Diagnosis Date    Anxiety     Diabetes mellitus, type 2     Hyperlipidemia     Hypertension     Sleep apnea        Past Surgical History:   Procedure Laterality Date    COLONSCOPY      KNEE ARTHROPLASTY         Time Tracking:     PT Received On: 06/24/25  PT Start Time: 1304     PT Stop Time: 1340  PT Total Time (min): 36 min     Billable Minutes: Evaluation 15 and Gait Training 21 06/24/2025

## 2025-06-24 NOTE — ANESTHESIA PROCEDURE NOTES
Spinal    Diagnosis: knee pain right  Patient location during procedure: OR  Start time: 6/24/2025 9:15 AM  Timeout: 6/24/2025 9:14 AM  End time: 6/24/2025 9:18 AM    Staffing  Authorizing Provider: Anthony Linn MD  Performing Provider: Anthony Linn MD    Staffing  Performed by: Anthony Linn MD  Authorized by: Anthony Linn MD    Preanesthetic Checklist  Completed: patient identified, IV checked, site marked, risks and benefits discussed, surgical consent, monitors and equipment checked, pre-op evaluation and timeout performed  Spinal Block  Patient position: sitting  Prep: ChloraPrep  Patient monitoring: cardiac monitor, continuous pulse ox and continuous capnometry  Approach: midline  Location: L3-4  Injection technique: single shot  CSF Fluid: clear free-flowing CSF  Needle  Needle type: pencil-tip   Needle gauge: 25 G  Needle length: 3.5 in  Needle localization: anatomical landmarks  Assessment  Ease of block: easy  Medications:    Medications: bupivacaine (pf) (MARCAINE) injection 0.75% - Intraspinal   1.4 mL - 6/24/2025 9:15:00 AM

## 2025-06-24 NOTE — ANESTHESIA PROCEDURE NOTES
Peripheral Block    Patient location during procedure: pre-op   Block not for primary anesthetic.  Reason for block: at surgeon's request and post-op pain management   Post-op Pain Location: right knee   Start time: 6/24/2025 8:05 AM  Timeout: 6/24/2025 8:04 AM   End time: 6/24/2025 8:10 AM    Staffing  Authorizing Provider: Anthony Linn MD  Performing Provider: Anthony Linn MD    Staffing  Performed by: Anthony Linn MD  Authorized by: Anthony Linn MD    Preanesthetic Checklist  Completed: patient identified, IV checked, site marked, risks and benefits discussed, surgical consent, monitors and equipment checked, pre-op evaluation and timeout performed  Peripheral Block  Patient position: supine  Prep: ChloraPrep  Patient monitoring: heart rate, cardiac monitor, continuous pulse ox, continuous capnometry and frequent blood pressure checks  Block type: adductor canal  Laterality: right  Injection technique: single shot  Needle  Needle type: Stimuplex   Needle gauge: 21 G  Needle length: 4 in  Needle localization: anatomical landmarks and ultrasound guidance   -ultrasound image captured on disc.  Assessment  Injection assessment: negative aspiration, negative parasthesia and local visualized surrounding nerve  Paresthesia pain: none  Heart rate change: no  Slow fractionated injection: yes    Medications:    Medications: BUPivacaine liposome (PF) 1.3 % (13.3 mg/mL) suspension - Injection   20 mL - 6/24/2025 8:05:00 AM  bupivacaine (pf) (MARCAINE) injection 0.5% - Perineural   10 mL - 6/24/2025 8:05:00 AM    Additional Notes  VSS.  [facility] RN monitoring vitals throughout procedure.  Patient tolerated procedure well.

## 2025-06-24 NOTE — OP NOTE
[facility]  Orthopedic Surgery  Operative Note    SUMMARY     Date of Procedure: 6/24/2025     Procedure: Procedure(s) (LRB):  ROBOTIC ARTHROPLASTY, KNEE, TOTAL (Right)       Surgeons and Role:     * Lucio Ivey MD - Primary    Assistant: Yousuf GARCIA    Pre-Operative Diagnosis: Primary osteoarthritis of right knee [M17.11]  Pre-op testing [Z01.818]    Post-Operative Diagnosis: Post-Op Diagnosis Codes:     * Primary osteoarthritis of right knee [M17.11]     * Pre-op testing [Z01.818]    Anesthesia: Spinal    Complications: No    Estimated Blood Loss (EBL): 40ml           Implants:   Implant Name Type Inv. Item Serial No.  Lot No. LRB No. Used Action   PIN BONE 3.0Z586WS - [id number]  PIN BONE [id number]  [employer]. [id number] Right 1 Implanted and Explanted   PIN FIXATION BONE 140X3.2MM - [id number]  PIN FIXATION BONE 140X3.2MM  [employer]. [id number] Right 1 Implanted and Explanted   CEMENT BONE ANTIBIO SIMPLEX P - [id number]  CEMENT BONE ANTIBIO SIMPLEX P  [employer]. [id number] Right 1 Implanted   PIN FIXATION BONE 140X3.2MM - [id number]  PIN FIXATION BONE 140X3.2MM  [employer]. [id number] Right 1 Wasted   COMP FEM CRUCIATE SARAH DZ 5 RT - [id number]  COMP FEM CRUCIATE SARAH DZ 5 RT  [employer]. [id number] Right 1 Implanted   PATELLA TRIATHLON 33X9 SYMTRC - [id number]  PATELLA TRIATHLON 33X9 SYMTRC  [employer]. [id number] Right 1 Implanted   PRIMARY TIBIAL BASE 5. - [id number]  PRIMARY TIBIAL BASE 5.  [employer]. [id number] Right 1 Implanted   INSERT CONVTNL POLYETH 9MM 5 - [id number]  INSERT CONVTNL POLYETH 9MM 5  [employer]. [id number] Right 1 Implanted       Tourniquet time: 53min at 300mmHg    Specimens:   Specimen (24h ago, onward)      None                    Condition: Good    Disposition: PACU - hemodynamically stable.    Attestation: I was present and scrubbed for the entire procedure.    INDICATIONS FOR THE  PROCEDURE: A 65M with a history of chronic   Right knee arthritis, had failed all conservative measures including cortisone   injections, PT, viscosupplementation and activity modification. After a long   discussion, the patient wished to proceed with the procedure above.     PROCEDURE IN DETAIL: Risks, benefits and alternatives of the procedure were   explained to the patient including, but not limited to damage to nerves,   arteries or blood vessels. Also explained risk of infection, stiffness, DVT, PE,   polyethylene wear as well as anesthetic complications including seizure, stroke,   heart attack and death, understood this and signed informed consent. The   patient's Right knee was marked prior to coming to the Operating Room. The patient was brought to the operating room, placed on the operating table in a supine position.A  formal timeout was done in which correct patient, procedure and op site were all   correctly identified and confirmed by the entire operating team.  2gm Ancef was given prior to surgical incision. Spinal anesthesia was induced. The patient'sRight  lower extremity was prepped and   draped in normal sterile fashion. TheRight  leg was exsanguinated with an   Esmarch. Tourniquet was inflated up 300 mmHg. Standard anterior approach to   the knee was made. Medial parapatellar arthrotomy was made, leaving about 1/8   inch of tissue for later repair. Proximal medial tibial release was performed,   making sure not to release any of the MCL. We then   everted the patella and reflexed the knee. Fat pad was excised as well as some   of the ACL. Soft tissue off the distal anterior femur was removed for   visualization, so as to help prevent notching.  We started off by placing our femoral pins.  Two pins were placed about 2 centimeters proximal and 1 centimeter anterior to the medial epicondyle.  We then retracted down to the level of the tibial tubercle.    Two pins were placed just short of  bicortically in the tibia.  We then hooked up our arays to our pins.  We placed 2 checkpoints.  One in the tibia and 1 in the femur.  We then took the leg through range of motion.  We then began by marking off our bony points.  We did this 1st on the femur and then on the tibia.  After this we did ligament balancing of the knee 1st extension and then in flexion by using some spoons and an osteotome.  We then adjusted our bone cuts on the computer and once we liked our plan began by making our cuts.  The ImmunotEGG robotic assisted cutting arm was then brought in and then we made our femoral cuts and then turned our attention to the tibia.  Tibial cuts were made.  All of bone was removed as well as excess soft tissue.  Posterior osteophytes removed as well.  We then began to trial.  We placed a size 5 femur and a size 5 tibia with a size 9 polyethylene.  We were within 1 millimeter between our medial and lateral compartments in both flexion and extension.        We turned our attention to patella, caliper was used on the patella where it   measured 25. We set guide at 16 and made our 9-mm cut for polyethylene component, 33 was selected. Then drill holes were placed and the patellar button was placed.   This had good tracking. No need for a lateral release and with no hand tests,   it stayed centered the whole time. We then marked our tibial rotation. We then drilled our femoral lugs.  We then   removed everything except the size 5 tibial tray. We then checked our tibial tray   with a drop kem again and then marked our rotation and pinned it into place then   drilled, and then punched for our keel. Robot and pins were all removed. We then started preparing final   components on the back table. Anterior, medial and lateral structures were injected with our local   Cocktail. Bony surfaces   copiously irrigated with Pulsavac and then thoroughly dried. Once the cement   was the appropriate consistency, first the tibia and then  the femur were   cemented into place, removing excess cement. A 9 trial was placed. The knee   was held in compression and then the patella was placed. Cement was allowed to   cure. Once the cement was cured, we then removed excess cement. Again trialed   one final time, again seeing a 9. We then tapped into place the   final 9 meniscal bearing into place. After this was done, we then did our   diluted iodine soak for 3 minutes and then proceeded with closing.  Arthrotomy was closed using our StrataFix.   Subcutaneous tissue was closed using 2-0 Vicryl and skin was using a running 3-0   StrataFix and Dermabond.Instrument, sponge, and needle counts were correct prior to wound closure and at the conclusion of the case.  Sterile dressing was applied.   They were extubated, awakened and transferred from the Operating Room to the   Recovery Room in stable condition.

## 2025-06-24 NOTE — ANESTHESIA POSTPROCEDURE EVALUATION
Anesthesia Post Evaluation    Patient: Yosef Giordano JrKadi    Procedure(s) Performed: Procedure(s) (LRB):  ROBOTIC ARTHROPLASTY, KNEE, TOTAL (Right)    Final Anesthesia Type: general      Patient location during evaluation: PACU  Patient participation: Yes- Able to Participate  Level of consciousness: awake and alert  Post-procedure vital signs: reviewed and stable  Pain management: adequate  Airway patency: patent    PONV status at discharge: No PONV  Anesthetic complications: no      Cardiovascular status: hemodynamically stable  Respiratory status: unassisted and room air  Hydration status: euvolemic  Follow-up not needed.              Vitals Value Taken Time   /60 06/24/25 12:30   Temp 36.6 °C (97.9 °F) 06/24/25 11:09   Pulse 84 06/24/25 12:30   Resp 16 06/24/25 12:30   SpO2 100 % 06/24/25 12:30         Event Time   Out of Recovery 12:05:00         Pain/Marybel Score: Pain Rating Prior to Med Admin: 2 (6/24/2025 11:49 AM)  Marybel Score: 10 (6/24/2025 12:05 PM)  Modified Marybel Score: 19 (6/24/2025 12:05 PM)

## 2025-06-24 NOTE — TRANSFER OF CARE
Anesthesia Transfer of Care Note    Patient: Yosef Giordano JrKadi    Procedure(s) Performed: Procedure(s) (LRB):  ROBOTIC ARTHROPLASTY, KNEE, TOTAL (Right)    Patient location: PACU    Anesthesia Type: spinal    Transport from OR: Transported from OR on 2-3 L/min O2 by NC with adequate spontaneous ventilation    Post pain: adequate analgesia    Post assessment: no apparent anesthetic complications    Post vital signs: stable    Level of consciousness: awake    Nausea/Vomiting: no nausea/vomiting    Complications: none    Transfer of care protocol was followed      Last vitals: Visit Vitals  BP (!) 141/78 (BP Location: Right arm, Patient Position: Lying)   Pulse 77   Temp 36.6 °C (97.9 °F) (Skin)   Resp 16   Wt 118.4 kg (261 lb)   SpO2 95%   BMI 38.54 kg/m²      Continued Stay Note   Nela     Patient Name: Zelalem Hung  MRN: [id number]  Today's Date: 4/6/2021    Admit Date: 4/2/2021    Discharge Plan     Row Name 04/06/21 1147       Plan    Plan Comments  Noted therapy is recommending short term rehab post dc. Attempted to call pt dtr (Alejandra Tai) [phone] but her voicemail is not set up and unable to leave a message. Will continue to try to reach pt dtr to discuss.        Discharge Codes    No documentation.             FRANCESCA Olsen

## 2025-06-24 NOTE — ANESTHESIA PREPROCEDURE EVALUATION
06/24/2025  Yosef Giordano Jr. is a 65 y.o., male.      Pre-op Assessment    I have reviewed the Patient Summary Reports.    I have reviewed the NPO Status.   I have reviewed the Medications.     Review of Systems  Anesthesia Hx:  No problems with previous Anesthesia                Cardiovascular:     Hypertension           hyperlipidemia                         Hypertension         Pulmonary:        Sleep Apnea     Obstructive Sleep Apnea (SUZY).           Musculoskeletal:  Arthritis        Arthritis          Neurological:    Neuromuscular Disease,           Arthritis  Peripheral Neuropathy                        Neuromuscular Disease   Endocrine:  Diabetes, type 2    Diabetes                    Obesity / BMI > 30  Psych:  Psychiatric History anxiety                 Physical Exam  General: Well nourished    Airway:  Mallampati: III   Mouth Opening: Normal  TM Distance: Normal  Neck ROM: Normal ROM        Anesthesia Plan  Type of Anesthesia, risks & benefits discussed:    Anesthesia Type: Spinal  Intra-op Monitoring Plan: Standard ASA Monitors  Post Op Pain Control Plan: multimodal analgesia and peripheral nerve block  Informed Consent: Informed consent signed with the Patient and all parties understand the risks and agree with anesthesia plan.  All questions answered.   ASA Score: 3    Ready For Surgery From Anesthesia Perspective.     .

## 2025-06-24 NOTE — DISCHARGE INSTRUCTIONS
"Discharge Instructions: After Your Surgery/Procedure  Youve just had surgery. During surgery you were given medicine called anesthesia to keep you relaxed and free of pain. After surgery you may have some pain or nausea. This is common. Here are some tips for feeling better and getting well after surgery.     Stay on schedule with your medication.   Going home  Your doctor or nurse will show you how to take care of yourself when you go home. He or she will also answer your questions. Have an adult family member or friend drive you home.      For your safety we recommend these precaution for the first 24 hours after your procedure:  Do not drive or use heavy equipment.  Do not make important decisions or sign legal papers.  Do not drink alcohol.  Have someone stay with you, if needed. He or she can watch for problems and help keep you safe.  Your concentration, balance, coordination, and judgement may be impaired for many hours after anesthesia.  Use caution when ambulating or standing up.     You may feel weak and "washed out" after anesthesia and surgery.      Subtle residual effects of general anesthesia or sedation with regional / local anesthesia can last more than 24 hours.  Rest for the remainder of the day or longer if your Doctor/Surgeon has advised you to do so.  Although you may feel normal within the first 24 hours, your reflexes and mental ability may be impaired without you realizing it.  You may feel dizzy, lightheaded or sleepy for 24 hours or longer.      Be sure to go to all follow-up visits with your doctor. And rest after your surgery for as long as your doctor tells you to.  Coping with pain  If you have pain after surgery, pain medicine will help you feel better. Take it as told, before pain becomes severe. Also, ask your doctor or pharmacist about other ways to control pain. This might be with heat, ice, or relaxation. And follow any other instructions your surgeon or nurse gives you.  Tips " for taking pain medicine  To get the best relief possible, remember these points:  Pain medicines can upset your stomach. Taking them with a little food may help.  Most pain relievers taken by mouth need at least 20 to 30 minutes to start to work.  Taking medicine on a schedule can help you remember to take it. Try to time your medicine so that you can take it before starting an activity. This might be before you get dressed, go for a walk, or sit down for dinner.  Constipation is a common side effect of pain medicines. Call your doctor before taking any medicines such as laxatives or stool softeners to help ease constipation. Also ask if you should skip any foods. Drinking lots of fluids and eating foods such as fruits and vegetables that are high in fiber can also help. Remember, do not take laxatives unless your surgeon has prescribed them.  Drinking alcohol and taking pain medicine can cause dizziness and slow your breathing. It can even be deadly. Do not drink alcohol while taking pain medicine.  Pain medicine can make you react more slowly to things. Do not drive or run machinery while taking pain medicine.  Your health care provider may tell you to take acetaminophen to help ease your pain. Ask him or her how much you are supposed to take each day. Acetaminophen or other pain relievers may interact with your prescription medicines or other over-the-counter (OTC) drugs. Some prescription medicines have acetaminophen and other ingredients. Using both prescription and OTC acetaminophen for pain can cause you to overdose. Read the labels on your OTC medicines with care. This will help you to clearly know the list of ingredients, how much to take, and any warnings. It may also help you not take too much acetaminophen. If you have questions or do not understand the information, ask your pharmacist or health care provider to explain it to you before you take the OTC medicine.  Managing nausea  Some people have an  upset stomach after surgery. This is often because of anesthesia, pain, or pain medicine, or the stress of surgery. These tips will help you handle nausea and eat healthy foods as you get better. If you were on a special food plan before surgery, ask your doctor if you should follow it while you get better. These tips may help:  Do not push yourself to eat. Your body will tell you when to eat and how much.  Start off with clear liquids and soup. They are easier to digest.  Next try semi-solid foods, such as mashed potatoes, applesauce, and gelatin, as you feel ready.  Slowly move to solid foods. Dont eat fatty, rich, or spicy foods at first.  Do not force yourself to have 3 large meals a day. Instead eat smaller amounts more often.  Take pain medicines with a small amount of solid food, such as crackers or toast, to avoid nausea.     Call your surgeon if  You still have pain an hour after taking medicine. The medicine may not be strong enough.  You feel too sleepy, dizzy, or groggy. The medicine may be too strong.  You have side effects like nausea, vomiting, or skin changes, such as rash, itching, or hives.       If you have obstructive sleep apnea  You were given anesthesia medicine during surgery to keep you comfortable and free of pain. After surgery, you may have more apnea spells because of this medicine and other medicines you were given. The spells may last longer than usual.   At home:  Keep using the continuous positive airway pressure (CPAP) device when you sleep. Unless your health care provider tells you not to, use it when you sleep, day or night. CPAP is a common device used to treat obstructive sleep apnea.  Talk with your provider before taking any pain medicine, muscle relaxants, or sedatives. Your provider will tell you about the possible dangers of taking these medicines.  © 9337-2552 [employer]. [address] [place] [address], [place] [address]. All rights reserved. This information is  not intended as a substitute for professional medical care. Always follow your healthcare professional's instructions.          Using an Incentive Spirometer    An incentive spirometer is a device that helps you do deep breathing exercises. These exercises expand your lungs, aid in circulation, and help prevent pneumonia. Deep breathing exercises also help you breathe better and improve the function of your lungs by:  Keeping your lungs clear  Strengthening your breathing muscles  Helping prevent respiratory complications or problems  The incentive spirometer gives you a way to take an active part in recover. A nurse or therapist will teach you breathing exercises. To do these exercises, you will breathe in through your mouth and not your nose. The incentive spirometer only works correctly if you breathe in through your mouth.  Steps to clear lungs  Step 1. Exhale normally. Then, inhale normally.  Relax and breathe out.  Step 2. Place your lips tightly around the mouthpiece.  Make sure the device is upright and not tilted.  Step 3. Inhale as much air as you can through the mouthpiece (don't breath through your nose).  Inhale slowly and deeply.  Hold your breath long enough to keep the balls or disk raised for at least 3 to 5 seconds, or as instructed by your healthcare provider.  Some spirometers have an indicator to let you know that you are breathing in too fast. If the indicator goes off, breathe in more slowly.  Step 4. Repeat the exercise regularly.  Do this exercise every hour while you're awake, or as instructed by your healthcare provider.  If you were taught deep breathing and coughing exercises, do them regularly as instructed by your healthcare provider.           Post op instructions for prevention of DVT  What is deep vein thrombosis?  Deep vein thrombosis (DVT) is the medical term for blood clots in the deep veins of the leg.  These blood clots can be dangerous.  A DVT can block a blood vessel and keep  blood from getting where it needs to go.  Another problem is that the clot can travel to other parts of the body such as the lungs.  A clot that travels to the lungs is called a pulmonary embolus (PE) and can cause serious problems with breathing which can lead to death.  Am I at risk for DVT/PE?  If you are not very active, you are at risk of DVT.  Anyone confined to bed, sitting for long periods of time, recovering from surgery, etc. increases the risk of DVT.  Other risk factors are cancer diagnosis, certain medications, estrogen replacement in any form,older age, obesity, pregnancy, smoking, history of clotting disorders, and dehydration.  How will I know if I have a DVT?  Swelling in the lower leg  Pain  Warmth, redness, hardness or bulging of the vein  If you have any of these symptoms, call your doctors office right away.  Some people will not have any symptoms until the clot moves to the lungs.  What are the symptoms of a PE?  Panting, shortness of breath, or trouble breathing  Sharp, knife-like chest pain when you breathe  Coughing or coughing up blood  Rapid heartbeat  If you have any of these symptoms or get worse quickly, call 911 for emergency treatment.  How can I prevent a DVT?  Avoid long periods of inactivity and dont cross your legs--get up and walk around every hour or so.  Stay active--walking after surgery is highly encouraged.  This means you should get out of the house and walk in the neighborhood.  Going up and down stairs will not impair healing (unless advised against such activity by your doctor).    Drink plenty of noncaffeinated, nonalcoholic fluids each day to prevent dehydration.  Wear special support stockings, if they have been advised by your doctor.  If you travel, stop at least once an hour and walk around.  Avoid smoking (assistance with stopping is available through your healthcare provider)  Always notify your doctor if you are not able to follow the post operative  instructions that are given to you at the time of discharge.  It may be necessary to prescribe one of the medications available to prevent DVT.          Exparel(bupivacaine) has been injected to provide approximately 72 hours of reduced pain after your surgery.  Do not remove the bracelet for five days.  Report to your doctor as soon as possible if you experience any of the following:   Restlessness   Anxiety   Speech problems    Lightheadedness   Numbness and tingling of the mouth and lips   Seizures    Metallic taste   Blurred vision   Tremors    Twitching   Depression   Extreme drowsiness  Avoid additional use of local anesthetics (such as dental procedures) for five days (96 hours).          We hope your stay was comfortable as you heal now, mend and rest.    For we have enjoyed taking care of you by giving your our best.    And as you get better, by regaining your health and strength;   We count it as a privilege to have served you and hope your time at [facility] was well spent.      Thank  You!!!

## 2025-06-24 NOTE — DISCHARGE SUMMARY
[facility]  Discharge Note  Short Stay    Procedure(s) (LRB):  ROBOTIC ARTHROPLASTY, KNEE, TOTAL (Right)      OUTCOME: Patient tolerated treatment/procedure well without complication and is now ready for discharge.    DISPOSITION: Home or Self Care    FINAL DIAGNOSIS:  Right knee osteoarthritis    FOLLOWUP: In clinic    DISCHARGE INSTRUCTIONS:    Discharge Procedure Orders   Diet general     Leave dressing on - Keep it clean, dry, and intact until clinic visit     Change dressing (specify)   Order Comments: Dressing change: If dressing loses its seal, change daily.     Call MD for:  temperature >100.4     Call MD for:  persistent nausea and vomiting     Call MD for:  severe uncontrolled pain     Call MD for:  difficulty breathing, headache or visual disturbances     Call MD for:  redness, tenderness, or signs of infection (pain, swelling, redness, odor or green/yellow discharge around incision site)     Call MD for:  hives     Call MD for:  persistent dizziness or light-headedness     Call MD for:  extreme fatigue     Keep surgical extremity elevated     Ice to affected area   Order Comments: using barrier between ice and skin (specify duration&frequency)     Weight bearing as tolerated     Activity as tolerated     Shower on day dressing removed (No bath)        TIME SPENT ON DISCHARGE: 5 minutes

## 2025-06-24 NOTE — H&P
Past Medical History:   Diagnosis Date    Anxiety      Diabetes mellitus, type 2      Hyperlipidemia      Hypertension                 Past Surgical History:   Procedure Laterality Date    KNEE ARTHROPLASTY                  Current Outpatient Medications   Medication Sig    aspirin 81 MG Chew Take 81 mg by mouth every other day.    blood sugar diagnostic Strp To check BG 1 times daily, to use with insurance preferred meter    blood-glucose meter kit To check BG 1 times daily, to use with insurance preferred meter    FARXIGA 10 mg tablet Take 1 tablet (10 mg total) by mouth once daily. Brand name farxiga    finasteride (PROSCAR) 5 mg tablet Take 1 tablet (5 mg total) by mouth once daily.    glipiZIDE (GLUCOTROL) 2.5 MG TR24 Take 1-2 tablets (2.5-5 mg total) by mouth daily with breakfast.    lancets Misc To check BG 1 times daily, to use with insurance preferred meter    LORazepam (ATIVAN) 1 MG tablet Take 1 tablet (1 mg total) by mouth daily as needed for Anxiety.    losartan (COZAAR) 100 MG tablet Take 1 tablet (100 mg total) by mouth once daily.    omega 3-dha-epa-fish oil (FISH OIL) 1,000 mg (120 mg-180 mg) Cap Take 1 capsule by mouth 2 (two) times daily.    pravastatin (PRAVACHOL) 40 MG tablet TAKE ONE TABLET BY MOUTH EVERY EVENING    sertraline (ZOLOFT) 100 MG tablet Take 2 tablets (200 mg total) by mouth once daily.      No current facility-administered medications for this visit.         Review of patient's allergies indicates:  No Known Allergies            Family History   Problem Relation Name Age of Onset    Cancer Mother             Social History           Socioeconomic History    Marital status:    Tobacco Use    Smoking status: Never    Smokeless tobacco: Never   Substance and Sexual Activity    Alcohol use: Not Currently    Drug use: Not Currently      Social Drivers of Health           Financial Resource Strain: Medium Risk (5/1/2025)     Overall Financial Resource Strain (CARDIA)       Difficulty of Paying Living Expenses: Somewhat hard   Food Insecurity: No Food Insecurity (5/1/2025)     Hunger Vital Sign      Worried About Running Out of Food in the Last Year: Never true      Ran Out of Food in the Last Year: Never true   Transportation Needs: No Transportation Needs (5/1/2025)     PRAPARE - Transportation      Lack of Transportation (Medical): No      Lack of Transportation (Non-Medical): No   Physical Activity: Insufficiently Active (5/1/2025)     Exercise Vital Sign      Days of Exercise per Week: 1 day      Minutes of Exercise per Session: 30 min   Stress: No Stress Concern Present (5/1/2025)     [place] [employer] - Occupational Stress Questionnaire      Feeling of Stress : Only a little   Housing Stability: Low Risk  (5/1/2025)     Housing Stability Vital Sign      Unable to Pay for Housing in the Last Year: No      Number of Times Moved in the Last Year: 0      Homeless in the Last Year: No         Chief Complaint:   No chief complaint on file.        History of present illness:  65-year-old male seen for right knee pain.  Patient has had pain for a few years now.  Patient saw Dr. Hayden and had his knee injected with cortisone.  No real relief.  Pain along the medial aspect of his knee.  Sharp shooting pain down the tibia at times.  Increased pain at night.  Pain when going from sitting to standing.  Describes as a constant aching throbbing pain.  He has had Synvisc as well. Pain is a 2/10.           Review of Systems:     Constitution: Negative for chills, fever, and sweats.  Negative for unexplained weight loss.     HENT:  Negative for headaches and blurry vision.     Cardiovascular:Negative for chest pain or irregular heart beat. Negative for hypertension.     Respiratory:  Negative for cough and shortness of breath.     Gastrointestinal: Negative for abdominal pain, heartburn, melena, nausea, and vomitting.     Genitourinary:  Negative bladder incontinence and  dysuria.     Musculoskeletal:  See HPI     Neurological: Negative for numbness.     Psychiatric/Behavioral: Negative for depression.  The patient is not nervous/anxious.       Endocrine: Negative for polyuria     Hematologic/Lymphatic: Negative for bleeding problem.  Does not bruise/bleed easily.     Skin: Negative for poor would healing and rash        Physical Examination:     Vital Signs:    There were no vitals filed for this visit.        There is no height or weight on file to calculate BMI.     This a well-developed, well nourished patient in no acute distress.  They are alert and oriented and cooperative to examination.  Pt. walks without an antalgic gait.       Examination of the right knee shows no rashes or erythema. There are no masses ecchymosis or effusion. Patient has full range of motion from 0-130°. Patient is nontender to palpation over lateral joint line and Moderately tender to palpation over the medial joint line. Patient has a - Lachman exam, - anterior drawer exam, and - posterior drawer exam. - Apley exam. Knee is stable to varus and valgus stress. 5 out of 5 motor strength. Palpable distal pulses. Intact light touch sensation. Negative Patellofemoral crepitus     Heart is regular rate without obvious murmurs   Normal respiratory effort without audible wheezing  Abdomen is soft and nontender         X-rays:  X-ray of both knees are ordered review which show moderate to severe medial joint space narrowing of the right knee           Assessment:  Right knee osteoarthritis     Plan:  I reviewed the findings with him today.  We talked about treatment options.  Patient has tried and failed conservative measures for years.  We talked in detail about right robotic assisted total knee arthroplasty.  Plan is for right James Chandana triathlon total knee arthroplasty using cement.  Risks, benefits, and alternatives to the procedure were explained to the patient including but not limited to damage to  nerves, arteries, blood vessels, bones, tendons, ligaments, stiffness, instability, infection, permanent limb dysfunction, DVT, PE, as well as general anesthetic complications including seizure, stroke, heart attack and even death. The patient understood these risks and wished to proceed and signed the informed consent.         The mobility limitation can not be sufficiently resolved by the use of a cane.  Patient's functional mobility deficit can be sufficiently resolved with the use of a rolling walker or a walker.  Patient has mobility limitation significantly impairs their ability to participate in 1 or more activities of daily living.  The use of a walker or rolling walker we will significantly improve the patient's ability to participate in activities of daily living and the patient will use it on a regular basis in the home.                       All previous pertinent notes including ER visits, physical therapy visits, other orthopedic visits as well as other care for the same musculoskeletal problem were reviewed.  All pertinent lab values and previous imaging was reviewed pertinent to the current visit.     This note was created using M Modal voice recognition software that occasionally misinterpreted phrases or words.     Consult note is delivered via Epic messaging service.

## 2025-06-26 VITALS
RESPIRATION RATE: 16 BRPM | HEART RATE: 84 BPM | OXYGEN SATURATION: 100 % | SYSTOLIC BLOOD PRESSURE: 128 MMHG | WEIGHT: 261 LBS | TEMPERATURE: 98 F | DIASTOLIC BLOOD PRESSURE: 60 MMHG | BODY MASS INDEX: 38.54 KG/M2

## 2025-07-07 ENCOUNTER — TELEPHONE (OUTPATIENT)
Dept: ORTHOPEDICS | Facility: CLINIC | Age: 65
End: 2025-07-07
Payer: MEDICARE

## 2025-07-07 NOTE — TELEPHONE ENCOUNTER
----- Message from Med Assistant Brasher sent at 7/7/2025 11:37 AM CDT -----  Contact: patient  Pt had surgery on 6/24/25 & his original bandage came off.  Does he need to have it replaced or can it stay off?    Call back number is [phone]

## 2025-07-07 NOTE — TELEPHONE ENCOUNTER
Spoke w/pt per call back msg received.  Pt inquiring about if bandage needs to be replaced or kept off.  Advised that bandage can stay off and may get incision wet, but cannot submerge extremity/incision into bathtub or pool.  Pt acknowledged; Pt has no further questions/concerns at this time.

## 2025-07-08 DIAGNOSIS — M17.11 PRIMARY OSTEOARTHRITIS OF RIGHT KNEE: Primary | ICD-10-CM

## 2025-07-14 ENCOUNTER — OFFICE VISIT (OUTPATIENT)
Dept: ORTHOPEDICS | Facility: CLINIC | Age: 65
End: 2025-07-14
Payer: MEDICARE

## 2025-07-14 ENCOUNTER — HOSPITAL ENCOUNTER (OUTPATIENT)
Dept: RADIOLOGY | Facility: HOSPITAL | Age: 65
Discharge: HOME | End: 2025-07-14
Attending: ORTHOPAEDIC SURGERY
Payer: MEDICARE

## 2025-07-14 VITALS — HEIGHT: 69 IN | WEIGHT: 261 LBS | RESPIRATION RATE: 18 BRPM | BODY MASS INDEX: 38.66 KG/M2

## 2025-07-14 DIAGNOSIS — M17.11 PRIMARY OSTEOARTHRITIS OF RIGHT KNEE: ICD-10-CM

## 2025-07-14 DIAGNOSIS — Z96.651 STATUS POST TOTAL RIGHT KNEE REPLACEMENT: Primary | ICD-10-CM

## 2025-07-14 PROCEDURE — 3044F HG A1C LEVEL LT 7.0%: CPT | Mod: CPTII,S$GLB,, | Performed by: ORTHOPAEDIC SURGERY

## 2025-07-14 PROCEDURE — 3066F NEPHROPATHY DOC TX: CPT | Mod: CPTII,S$GLB,, | Performed by: ORTHOPAEDIC SURGERY

## 2025-07-14 PROCEDURE — 4010F ACE/ARB THERAPY RXD/TAKEN: CPT | Mod: CPTII,S$GLB,, | Performed by: ORTHOPAEDIC SURGERY

## 2025-07-14 PROCEDURE — 1159F MED LIST DOCD IN RCRD: CPT | Mod: CPTII,S$GLB,, | Performed by: ORTHOPAEDIC SURGERY

## 2025-07-14 PROCEDURE — 73560 X-RAY EXAM OF KNEE 1 OR 2: CPT | Mod: 26,RT,, | Performed by: RADIOLOGY

## 2025-07-14 PROCEDURE — 1126F AMNT PAIN NOTED NONE PRSNT: CPT | Mod: CPTII,S$GLB,, | Performed by: ORTHOPAEDIC SURGERY

## 2025-07-14 PROCEDURE — 1101F PT FALLS ASSESS-DOCD LE1/YR: CPT | Mod: CPTII,S$GLB,, | Performed by: ORTHOPAEDIC SURGERY

## 2025-07-14 PROCEDURE — 3288F FALL RISK ASSESSMENT DOCD: CPT | Mod: CPTII,S$GLB,, | Performed by: ORTHOPAEDIC SURGERY

## 2025-07-14 PROCEDURE — 99999 PR PBB SHADOW E&M-EST. PATIENT-LVL IV: CPT | Mod: PBBFAC,,, | Performed by: ORTHOPAEDIC SURGERY

## 2025-07-14 PROCEDURE — 99024 POSTOP FOLLOW-UP VISIT: CPT | Mod: S$GLB,,, | Performed by: ORTHOPAEDIC SURGERY

## 2025-07-14 PROCEDURE — 3060F POS MICROALBUMINURIA REV: CPT | Mod: CPTII,S$GLB,, | Performed by: ORTHOPAEDIC SURGERY

## 2025-07-14 PROCEDURE — 73560 X-RAY EXAM OF KNEE 1 OR 2: CPT | Mod: TC,PO,RT

## 2025-07-14 RX ORDER — ASPIRIN 81 MG/1
81 TABLET ORAL 2 TIMES DAILY
Qty: 56 TABLET | Refills: 0 | Status: SHIPPED | OUTPATIENT
Start: 2025-07-14 | End: 2026-07-14

## 2025-08-08 DIAGNOSIS — E11.29 TYPE 2 DIABETES MELLITUS WITH MICROALBUMINURIA, WITHOUT LONG-TERM CURRENT USE OF INSULIN: ICD-10-CM

## 2025-08-08 DIAGNOSIS — R80.9 TYPE 2 DIABETES MELLITUS WITH MICROALBUMINURIA, WITHOUT LONG-TERM CURRENT USE OF INSULIN: ICD-10-CM

## 2025-08-08 DIAGNOSIS — I10 ESSENTIAL HYPERTENSION, BENIGN: ICD-10-CM

## 2025-08-08 RX ORDER — LOSARTAN POTASSIUM 100 MG/1
100 TABLET ORAL
Qty: 90 TABLET | Refills: 3 | Status: SHIPPED | OUTPATIENT
Start: 2025-08-08

## 2025-08-18 ENCOUNTER — OFFICE VISIT (OUTPATIENT)
Dept: ORTHOPEDICS | Facility: CLINIC | Age: 65
End: 2025-08-18
Payer: MEDICARE

## 2025-08-18 DIAGNOSIS — Z96.651 STATUS POST TOTAL RIGHT KNEE REPLACEMENT: Primary | ICD-10-CM

## 2025-08-18 PROCEDURE — 3044F HG A1C LEVEL LT 7.0%: CPT | Mod: CPTII,S$GLB,, | Performed by: ORTHOPAEDIC SURGERY

## 2025-08-18 PROCEDURE — 99024 POSTOP FOLLOW-UP VISIT: CPT | Mod: S$GLB,,, | Performed by: ORTHOPAEDIC SURGERY

## 2025-08-18 PROCEDURE — 3066F NEPHROPATHY DOC TX: CPT | Mod: CPTII,S$GLB,, | Performed by: ORTHOPAEDIC SURGERY

## 2025-08-18 PROCEDURE — 99999 PR PBB SHADOW E&M-EST. PATIENT-LVL I: CPT | Mod: PBBFAC,,, | Performed by: ORTHOPAEDIC SURGERY

## 2025-08-18 PROCEDURE — 3060F POS MICROALBUMINURIA REV: CPT | Mod: CPTII,S$GLB,, | Performed by: ORTHOPAEDIC SURGERY

## 2025-08-18 PROCEDURE — 1160F RVW MEDS BY RX/DR IN RCRD: CPT | Mod: CPTII,S$GLB,, | Performed by: ORTHOPAEDIC SURGERY

## 2025-08-18 PROCEDURE — 4010F ACE/ARB THERAPY RXD/TAKEN: CPT | Mod: CPTII,S$GLB,, | Performed by: ORTHOPAEDIC SURGERY

## 2025-08-18 PROCEDURE — 1159F MED LIST DOCD IN RCRD: CPT | Mod: CPTII,S$GLB,, | Performed by: ORTHOPAEDIC SURGERY

## 2025-08-20 DIAGNOSIS — E11.9 TYPE 2 DIABETES MELLITUS WITHOUT COMPLICATION, UNSPECIFIED WHETHER LONG TERM INSULIN USE: ICD-10-CM

## 2025-08-31 PROBLEM — N10 ACUTE PYELONEPHRITIS: Status: ACTIVE | Noted: 2025-08-31

## 2025-09-02 ENCOUNTER — TELEPHONE (OUTPATIENT)
Dept: PRIMARY CARE CLINIC | Facility: CLINIC | Age: 65
End: 2025-09-02
Payer: MEDICARE

## 2025-09-02 ENCOUNTER — TELEPHONE (OUTPATIENT)
Dept: UROLOGY | Facility: CLINIC | Age: 65
End: 2025-09-02
Payer: MEDICARE

## 2025-09-04 ENCOUNTER — PATIENT OUTREACH (OUTPATIENT)
Dept: ADMINISTRATIVE | Facility: CLINIC | Age: 65
End: 2025-09-04
Payer: MEDICARE

## (undated) DEVICE — BNDG COFLEX FOAM LF2 ST 4X5YD

## (undated) DEVICE — BANDAGE ESMARK ELASTIC ST 6X9

## (undated) DEVICE — GLOVE SENSICARE PI ALOE 7.5

## (undated) DEVICE — BLADE SURG CARBON STEEL SZ11

## (undated) DEVICE — COVER TABLE 44X90 STERILE

## (undated) DEVICE — STRAP OR TABLE 5IN X 72IN

## (undated) DEVICE — YANKAUER FLEX NO VENT HI CAP

## (undated) DEVICE — PADDING WYTEX UNDRCST 6INX4YD

## (undated) DEVICE — APPLICATOR CHLORAPREP ORN 26ML

## (undated) DEVICE — PAD CAST SPECIALIST STRL 6

## (undated) DEVICE — DRAPE THREE-QTR REINF 53X77IN

## (undated) DEVICE — TOWEL OR DISP STRL BLUE 4/PK

## (undated) DEVICE — INTERPULSE SET

## (undated) DEVICE — GOWN TOGA SYS PEELWY ZIP 2 XL

## (undated) DEVICE — KIT VIZADISC KNEE TRACKING

## (undated) DEVICE — ELECTRODE BLD ULTRA 4IN STRL

## (undated) DEVICE — DRAPE STERI U-SHAPED 47X51IN

## (undated) DEVICE — KIT CHECKPOINT MAKO

## (undated) DEVICE — CATH URETHRAL RED RUBBER 18FR

## (undated) DEVICE — MANIFOLD 4 PORT

## (undated) DEVICE — PACK SIRUS BASIC V SURG STRL

## (undated) DEVICE — SLEEVE SCD EXPRESS KNEE MEDIUM

## (undated) DEVICE — PAD ABDOMINAL STERILE 8X10IN

## (undated) DEVICE — BLADE SAG DUAL 18MMX1.27MMX90M

## (undated) DEVICE — SOL NORMAL USPCA 0.9%

## (undated) DEVICE — PENCIL SMK EVAC CONNECTOR 10FT

## (undated) DEVICE — PADDING CAST SPECIALIST 6X4YD

## (undated) DEVICE — WRAP KNEE ACCU THERM GEL PACK

## (undated) DEVICE — BONE PINS (3.2MM X 110MM)
Type: IMPLANTABLE DEVICE | Site: KNEE | Status: NON-FUNCTIONAL
Removed: 2025-06-24

## (undated) DEVICE — KIT TRIATHLON TIBIAL SIZER

## (undated) DEVICE — BANDAGE ACE DOUBLE STER 6IN

## (undated) DEVICE — SPONGE BULKEE II ABSRB 6X6.75

## (undated) DEVICE — NDL SPINAL 18GX3.5 SPINOCAN

## (undated) DEVICE — TOGA FLYTE PEEL AWAY XLARGE

## (undated) DEVICE — SYR 50CC LL

## (undated) DEVICE — PACK EXTREMITY ORTHOMAX

## (undated) DEVICE — KIT LEG POSITIONER DISPOSABLES

## (undated) DEVICE — DRAPE STERI INSTRUMENT 1018

## (undated) DEVICE — SUT VICRYL PLUS 2-0 CT1 18

## (undated) DEVICE — ADHESIVE DERMABOND ADVANCED

## (undated) DEVICE — KIT DRAPE RIO ONE PIECE W/POCK

## (undated) DEVICE — TOURNIQUET SB QC DP 34X4IN

## (undated) DEVICE — ALCOHOL RUBBING 70% ISO 4OZ

## (undated) DEVICE — GLOVE SENSICARE PI GRN 8

## (undated) DEVICE — LINER SUCTION 3000CC

## (undated) DEVICE — DRAPE ORTH SPLIT 77X108IN

## (undated) DEVICE — PACK CUSTOM UNIV BASIN SLI

## (undated) DEVICE — BLADE SURG CARBON STEEL #10

## (undated) DEVICE — COVER MAYO STND XL 30X57IN

## (undated) DEVICE — BNDG COFLEX FOAM LF2 ST 6X5YD

## (undated) DEVICE — DRESSING MEPILEX 4X12IN

## (undated) DEVICE — ELECTRODE MEGADYNE RETURN DUAL

## (undated) DEVICE — SUT STRATAFIX SPRL PS-2 3-0

## (undated) DEVICE — DRAPE INCISE IOBAN 2 23X17IN

## (undated) DEVICE — DRESSING GAUZE OIL EMUL 3X8

## (undated) DEVICE — SUT STRATAFIX PDS 1 CTX 18IN

## (undated) DEVICE — TAPE SILK 3IN

## (undated) DEVICE — BONE PINS (3.2MM X 140MM): Type: IMPLANTABLE DEVICE | Site: KNEE | Status: NON-FUNCTIONAL

## (undated) DEVICE — SOL NACL IRR 1000ML BTL

## (undated) DEVICE — SOL POVIDONE PREP IODINE 4 OZ

## (undated) DEVICE — KIT TRIATHLON CR TIB PREP SZ5

## (undated) DEVICE — BLADE MAKO NARROW

## (undated) DEVICE — BLADE TONGUE DEPRESSOR STRL

## (undated) DEVICE — DECANTER FLUID TRNSF WHITE 9IN